# Patient Record
Sex: MALE | Race: WHITE | Employment: OTHER | URBAN - METROPOLITAN AREA
[De-identification: names, ages, dates, MRNs, and addresses within clinical notes are randomized per-mention and may not be internally consistent; named-entity substitution may affect disease eponyms.]

---

## 2020-03-03 ENCOUNTER — APPOINTMENT (OUTPATIENT)
Dept: CT IMAGING | Age: 70
DRG: 871 | End: 2020-03-03
Payer: MEDICARE

## 2020-03-03 ENCOUNTER — APPOINTMENT (OUTPATIENT)
Dept: GENERAL RADIOLOGY | Age: 70
DRG: 871 | End: 2020-03-03
Payer: MEDICARE

## 2020-03-03 ENCOUNTER — HOSPITAL ENCOUNTER (INPATIENT)
Age: 70
LOS: 4 days | Discharge: HOME | DRG: 871 | End: 2020-03-07
Attending: EMERGENCY MEDICINE | Admitting: INTERNAL MEDICINE
Payer: MEDICARE

## 2020-03-03 PROBLEM — J18.9 PNEUMONIA: Status: ACTIVE | Noted: 2020-03-03

## 2020-03-03 LAB
ABSOLUTE EOS #: 0 K/UL (ref 0–0.4)
ABSOLUTE IMMATURE GRANULOCYTE: 0 K/UL (ref 0–0.3)
ABSOLUTE LYMPH #: 4.04 K/UL (ref 1–4.8)
ABSOLUTE MONO #: 0.4 K/UL (ref 0.2–0.8)
ANION GAP SERPL CALCULATED.3IONS-SCNC: 16 MMOL/L (ref 9–17)
ATYPICAL LYMPHOCYTE ABSOLUTE COUNT: 1.11 K/UL
ATYPICAL LYMPHOCYTES: 11 %
BASOPHILS # BLD: 0 %
BASOPHILS ABSOLUTE: 0 K/UL (ref 0–0.2)
BNP INTERPRETATION: ABNORMAL
BUN BLDV-MCNC: 19 MG/DL (ref 8–23)
BUN/CREAT BLD: 17 (ref 9–20)
CALCIUM SERPL-MCNC: 8.7 MG/DL (ref 8.6–10.4)
CHLORIDE BLD-SCNC: 95 MMOL/L (ref 98–107)
CO2: 22 MMOL/L (ref 20–31)
CREAT SERPL-MCNC: 1.09 MG/DL (ref 0.7–1.2)
DIFFERENTIAL TYPE: ABNORMAL
DIRECT EXAM: NORMAL
EOSINOPHILS RELATIVE PERCENT: 0 % (ref 1–4)
FIO2: 50
GFR AFRICAN AMERICAN: >60 ML/MIN
GFR NON-AFRICAN AMERICAN: >60 ML/MIN
GFR SERPL CREATININE-BSD FRML MDRD: ABNORMAL ML/MIN/{1.73_M2}
GFR SERPL CREATININE-BSD FRML MDRD: ABNORMAL ML/MIN/{1.73_M2}
GLUCOSE BLD-MCNC: 114 MG/DL (ref 70–99)
HCT VFR BLD CALC: 42.4 % (ref 40.7–50.3)
HEMOGLOBIN: 13.9 G/DL (ref 13–17)
IMMATURE GRANULOCYTES: 0 %
LACTIC ACID, SEPSIS WHOLE BLOOD: NORMAL MMOL/L (ref 0.5–1.9)
LACTIC ACID, SEPSIS WHOLE BLOOD: NORMAL MMOL/L (ref 0.5–1.9)
LACTIC ACID, SEPSIS: 0.9 MMOL/L (ref 0.5–1.9)
LACTIC ACID, SEPSIS: 1.5 MMOL/L (ref 0.5–1.9)
LYMPHOCYTES # BLD: 40 % (ref 24–44)
Lab: NORMAL
MCH RBC QN AUTO: 31.1 PG (ref 25.2–33.5)
MCHC RBC AUTO-ENTMCNC: 32.8 G/DL (ref 28.4–34.8)
MCV RBC AUTO: 94.9 FL (ref 82.6–102.9)
MONOCYTES # BLD: 4 % (ref 1–7)
NEGATIVE BASE EXCESS, ART: 4 (ref 0–2)
NRBC AUTOMATED: 0 PER 100 WBC
O2 DEVICE/FLOW/%: ABNORMAL
PATIENT TEMP: 103
PDW BLD-RTO: 12.9 % (ref 11.8–14.4)
PLATELET # BLD: 104 K/UL (ref 138–453)
PLATELET ESTIMATE: ABNORMAL
PMV BLD AUTO: 10.4 FL (ref 8.1–13.5)
POC HCO3: 20.3 MMOL/L (ref 22–27)
POC O2 SATURATION: 89 %
POC PCO2 TEMP: 34 MM HG
POC PCO2: 31 MM HG (ref 32–45)
POC PH TEMP: 7.39
POC PH: 7.43 (ref 7.35–7.45)
POC PO2 TEMP: 65 MM HG
POC PO2: 55 MM HG (ref 75–95)
POSITIVE BASE EXCESS, ART: ABNORMAL (ref 0–2)
POTASSIUM SERPL-SCNC: 4 MMOL/L (ref 3.7–5.3)
PRO-BNP: 710 PG/ML
RBC # BLD: 4.47 M/UL (ref 4.21–5.77)
RBC # BLD: ABNORMAL 10*6/UL
SEG NEUTROPHILS: 45 % (ref 36–66)
SEGMENTED NEUTROPHILS ABSOLUTE COUNT: 4.55 K/UL (ref 1.8–7.7)
SODIUM BLD-SCNC: 133 MMOL/L (ref 135–144)
SPECIMEN DESCRIPTION: NORMAL
TCO2 (CALC), ART: 21 MMOL/L (ref 23–28)
TROPONIN INTERP: NORMAL
TROPONIN T: NORMAL NG/ML
TROPONIN, HIGH SENSITIVITY: 22 NG/L (ref 0–22)
WBC # BLD: 10.1 K/UL (ref 3.5–11.3)
WBC # BLD: ABNORMAL 10*3/UL

## 2020-03-03 PROCEDURE — 87086 URINE CULTURE/COLONY COUNT: CPT

## 2020-03-03 PROCEDURE — 93005 ELECTROCARDIOGRAM TRACING: CPT | Performed by: EMERGENCY MEDICINE

## 2020-03-03 PROCEDURE — 96374 THER/PROPH/DIAG INJ IV PUSH: CPT

## 2020-03-03 PROCEDURE — 83880 ASSAY OF NATRIURETIC PEPTIDE: CPT

## 2020-03-03 PROCEDURE — 71045 X-RAY EXAM CHEST 1 VIEW: CPT

## 2020-03-03 PROCEDURE — 2580000003 HC RX 258: Performed by: EMERGENCY MEDICINE

## 2020-03-03 PROCEDURE — 81001 URINALYSIS AUTO W/SCOPE: CPT

## 2020-03-03 PROCEDURE — 6360000004 HC RX CONTRAST MEDICATION: Performed by: EMERGENCY MEDICINE

## 2020-03-03 PROCEDURE — 80048 BASIC METABOLIC PNL TOTAL CA: CPT

## 2020-03-03 PROCEDURE — 94761 N-INVAS EAR/PLS OXIMETRY MLT: CPT

## 2020-03-03 PROCEDURE — 96375 TX/PRO/DX INJ NEW DRUG ADDON: CPT

## 2020-03-03 PROCEDURE — 71260 CT THORAX DX C+: CPT

## 2020-03-03 PROCEDURE — 82803 BLOOD GASES ANY COMBINATION: CPT

## 2020-03-03 PROCEDURE — 83605 ASSAY OF LACTIC ACID: CPT

## 2020-03-03 PROCEDURE — 6360000002 HC RX W HCPCS: Performed by: EMERGENCY MEDICINE

## 2020-03-03 PROCEDURE — 87040 BLOOD CULTURE FOR BACTERIA: CPT

## 2020-03-03 PROCEDURE — 94640 AIRWAY INHALATION TREATMENT: CPT

## 2020-03-03 PROCEDURE — 85025 COMPLETE CBC W/AUTO DIFF WBC: CPT

## 2020-03-03 PROCEDURE — 2000000000 HC ICU R&B

## 2020-03-03 PROCEDURE — 87804 INFLUENZA ASSAY W/OPTIC: CPT

## 2020-03-03 PROCEDURE — 36600 WITHDRAWAL OF ARTERIAL BLOOD: CPT

## 2020-03-03 PROCEDURE — 6370000000 HC RX 637 (ALT 250 FOR IP): Performed by: EMERGENCY MEDICINE

## 2020-03-03 PROCEDURE — 84484 ASSAY OF TROPONIN QUANT: CPT

## 2020-03-03 PROCEDURE — 2700000000 HC OXYGEN THERAPY PER DAY

## 2020-03-03 PROCEDURE — 99285 EMERGENCY DEPT VISIT HI MDM: CPT

## 2020-03-03 PROCEDURE — 36415 COLL VENOUS BLD VENIPUNCTURE: CPT

## 2020-03-03 RX ORDER — METHYLPREDNISOLONE SODIUM SUCCINATE 125 MG/2ML
125 INJECTION, POWDER, LYOPHILIZED, FOR SOLUTION INTRAMUSCULAR; INTRAVENOUS ONCE
Status: COMPLETED | OUTPATIENT
Start: 2020-03-03 | End: 2020-03-03

## 2020-03-03 RX ORDER — SODIUM CHLORIDE 9 MG/ML
INJECTION, SOLUTION INTRAVENOUS CONTINUOUS
Status: DISCONTINUED | OUTPATIENT
Start: 2020-03-04 | End: 2020-03-06

## 2020-03-03 RX ORDER — SODIUM CHLORIDE 0.9 % (FLUSH) 0.9 %
10 SYRINGE (ML) INJECTION EVERY 12 HOURS SCHEDULED
Status: DISCONTINUED | OUTPATIENT
Start: 2020-03-04 | End: 2020-03-07 | Stop reason: HOSPADM

## 2020-03-03 RX ORDER — ATORVASTATIN CALCIUM 40 MG/1
40 TABLET, FILM COATED ORAL DAILY
Status: DISCONTINUED | OUTPATIENT
Start: 2020-03-04 | End: 2020-03-07 | Stop reason: HOSPADM

## 2020-03-03 RX ORDER — LISINOPRIL 5 MG/1
5 TABLET ORAL DAILY
Status: DISCONTINUED | OUTPATIENT
Start: 2020-03-04 | End: 2020-03-07 | Stop reason: HOSPADM

## 2020-03-03 RX ORDER — 0.9 % SODIUM CHLORIDE 0.9 %
80 INTRAVENOUS SOLUTION INTRAVENOUS ONCE
Status: COMPLETED | OUTPATIENT
Start: 2020-03-03 | End: 2020-03-03

## 2020-03-03 RX ORDER — ACETAMINOPHEN 325 MG/1
650 TABLET ORAL EVERY 6 HOURS PRN
Status: DISCONTINUED | OUTPATIENT
Start: 2020-03-03 | End: 2020-03-07 | Stop reason: HOSPADM

## 2020-03-03 RX ORDER — SODIUM CHLORIDE 0.9 % (FLUSH) 0.9 %
10 SYRINGE (ML) INJECTION PRN
Status: DISCONTINUED | OUTPATIENT
Start: 2020-03-03 | End: 2020-03-03 | Stop reason: SDUPTHER

## 2020-03-03 RX ORDER — ACETAMINOPHEN 650 MG/1
650 SUPPOSITORY RECTAL EVERY 6 HOURS PRN
Status: DISCONTINUED | OUTPATIENT
Start: 2020-03-03 | End: 2020-03-07 | Stop reason: HOSPADM

## 2020-03-03 RX ORDER — ALBUTEROL SULFATE 2.5 MG/3ML
15 SOLUTION RESPIRATORY (INHALATION)
Status: DISCONTINUED | OUTPATIENT
Start: 2020-03-03 | End: 2020-03-03

## 2020-03-03 RX ORDER — ALBUTEROL SULFATE 2.5 MG/3ML
2.5 SOLUTION RESPIRATORY (INHALATION)
Status: DISCONTINUED | OUTPATIENT
Start: 2020-03-03 | End: 2020-03-03

## 2020-03-03 RX ORDER — PANTOPRAZOLE SODIUM 40 MG/1
40 TABLET, DELAYED RELEASE ORAL
Status: DISCONTINUED | OUTPATIENT
Start: 2020-03-04 | End: 2020-03-07 | Stop reason: HOSPADM

## 2020-03-03 RX ORDER — SODIUM CHLORIDE 0.9 % (FLUSH) 0.9 %
10 SYRINGE (ML) INJECTION ONCE
Status: COMPLETED | OUTPATIENT
Start: 2020-03-03 | End: 2020-03-03

## 2020-03-03 RX ORDER — 0.9 % SODIUM CHLORIDE 0.9 %
30 INTRAVENOUS SOLUTION INTRAVENOUS ONCE
Status: COMPLETED | OUTPATIENT
Start: 2020-03-03 | End: 2020-03-03

## 2020-03-03 RX ORDER — SODIUM CHLORIDE 0.9 % (FLUSH) 0.9 %
10 SYRINGE (ML) INJECTION EVERY 12 HOURS SCHEDULED
Status: DISCONTINUED | OUTPATIENT
Start: 2020-03-03 | End: 2020-03-03 | Stop reason: SDUPTHER

## 2020-03-03 RX ORDER — ACETAMINOPHEN 500 MG
1000 TABLET ORAL ONCE
Status: COMPLETED | OUTPATIENT
Start: 2020-03-03 | End: 2020-03-03

## 2020-03-03 RX ORDER — KETOROLAC TROMETHAMINE 30 MG/ML
30 INJECTION, SOLUTION INTRAMUSCULAR; INTRAVENOUS ONCE
Status: COMPLETED | OUTPATIENT
Start: 2020-03-03 | End: 2020-03-03

## 2020-03-03 RX ORDER — SODIUM CHLORIDE 0.9 % (FLUSH) 0.9 %
10 SYRINGE (ML) INJECTION PRN
Status: DISCONTINUED | OUTPATIENT
Start: 2020-03-03 | End: 2020-03-07 | Stop reason: HOSPADM

## 2020-03-03 RX ORDER — METOPROLOL SUCCINATE 25 MG/1
25 TABLET, EXTENDED RELEASE ORAL DAILY
Status: DISCONTINUED | OUTPATIENT
Start: 2020-03-04 | End: 2020-03-07 | Stop reason: HOSPADM

## 2020-03-03 RX ADMIN — SODIUM CHLORIDE 80 ML: 9 INJECTION, SOLUTION INTRAVENOUS at 19:14

## 2020-03-03 RX ADMIN — CEFTRIAXONE SODIUM 1 G: 1 INJECTION, POWDER, FOR SOLUTION INTRAMUSCULAR; INTRAVENOUS at 19:56

## 2020-03-03 RX ADMIN — Medication 10 ML: at 19:14

## 2020-03-03 RX ADMIN — METHYLPREDNISOLONE SODIUM SUCCINATE 125 MG: 125 INJECTION, POWDER, FOR SOLUTION INTRAMUSCULAR; INTRAVENOUS at 19:45

## 2020-03-03 RX ADMIN — ALBUTEROL SULFATE 2.5 MG: 2.5 SOLUTION RESPIRATORY (INHALATION) at 18:44

## 2020-03-03 RX ADMIN — ALBUTEROL SULFATE 2.5 MG: 2.5 SOLUTION RESPIRATORY (INHALATION) at 18:40

## 2020-03-03 RX ADMIN — ACETAMINOPHEN 1000 MG: 500 TABLET ORAL at 19:06

## 2020-03-03 RX ADMIN — IOPAMIDOL 75 ML: 755 INJECTION, SOLUTION INTRAVENOUS at 19:14

## 2020-03-03 RX ADMIN — AZITHROMYCIN MONOHYDRATE 500 MG: 500 INJECTION, POWDER, LYOPHILIZED, FOR SOLUTION INTRAVENOUS at 20:33

## 2020-03-03 RX ADMIN — SODIUM CHLORIDE 2178 ML: 9 INJECTION, SOLUTION INTRAVENOUS at 19:29

## 2020-03-03 RX ADMIN — KETOROLAC TROMETHAMINE 30 MG: 30 INJECTION, SOLUTION INTRAMUSCULAR at 19:45

## 2020-03-03 ASSESSMENT — ENCOUNTER SYMPTOMS
ABDOMINAL PAIN: 0
CHEST TIGHTNESS: 0
COUGH: 1
SHORTNESS OF BREATH: 1
FACIAL SWELLING: 0
WHEEZING: 1
BACK PAIN: 0
EYE DISCHARGE: 0
ABDOMINAL DISTENTION: 0
EYE PAIN: 0

## 2020-03-03 ASSESSMENT — PAIN DESCRIPTION - PAIN TYPE: TYPE: ACUTE PAIN

## 2020-03-03 ASSESSMENT — PAIN SCALES - GENERAL
PAINLEVEL_OUTOF10: 8
PAINLEVEL_OUTOF10: 9
PAINLEVEL_OUTOF10: 10
PAINLEVEL_OUTOF10: 0

## 2020-03-03 ASSESSMENT — PAIN DESCRIPTION - ONSET: ONSET: ON-GOING

## 2020-03-03 ASSESSMENT — PAIN DESCRIPTION - DESCRIPTORS: DESCRIPTORS: PRESSURE

## 2020-03-03 ASSESSMENT — PAIN DESCRIPTION - FREQUENCY: FREQUENCY: CONTINUOUS

## 2020-03-03 ASSESSMENT — PAIN DESCRIPTION - PROGRESSION: CLINICAL_PROGRESSION: NOT CHANGED

## 2020-03-03 ASSESSMENT — PAIN DESCRIPTION - LOCATION: LOCATION: CHEST

## 2020-03-03 NOTE — ED PROVIDER NOTES
Physical Exam  Vitals signs and nursing note reviewed. Constitutional:       General: He is not in acute distress. Appearance: He is well-developed. He is not diaphoretic. HENT:      Head: Normocephalic and atraumatic. Eyes:      Pupils: Pupils are equal, round, and reactive to light. Neck:      Musculoskeletal: Normal range of motion and neck supple. Cardiovascular:      Rate and Rhythm: Normal rate and regular rhythm. Pulmonary:      Effort: Respiratory distress present. Breath sounds: Wheezing present. Comments: Patient in obvious respiratory distress tachypnea talking in 1 and 2 word sentences with accessory use and retractions. Abdominal:      General: Bowel sounds are normal.      Palpations: Abdomen is soft. Musculoskeletal: Normal range of motion. Skin:     General: Skin is warm. Capillary Refill: Capillary refill takes less than 2 seconds. Neurological:      Mental Status: He is alert and oriented to person, place, and time. MEDICAL DECISION MAKING:   Patient was seen and examined. Patient 71-year-old male who presented to the emergency department secondary to shortness of breath wheezing. On arrival in the emergency department patient 84% on room air was transitioned to nasal cannula with only improvement to 85% and subsequently to a nonrebreather by myself. Respiratory protocol initiated. Patient wheezing on exam received a DuoNeb breathing treatment Solu-Medrol. Patient was febrile rapid influenza obtained chest x-ray UA to rule out an infectious source. Given patient's hypoxia and shortness of breath, he underwent a CT to rule out PE. Patient will likely require admission and will be reevaluated. Patient reevaluated continued to have hypoxia was transferred to a Ventimask. Given elevated temperature and tachypnea patient  Meets SIRS criteria. Rapid influenza negative.  Chest x-ray concerning for pneumonia, CT negative for PE but also concerning for the visit, no guarantees can be provided that every mistake has been identified and corrected by editing.                     Samantha Patten MD  10/99/41 2045

## 2020-03-04 PROBLEM — J96.01 ACUTE HYPOXEMIC RESPIRATORY FAILURE (HCC): Status: ACTIVE | Noted: 2020-03-04

## 2020-03-04 PROBLEM — D64.9 NORMOCYTIC ANEMIA: Status: ACTIVE | Noted: 2020-03-04

## 2020-03-04 PROBLEM — D69.6 THROMBOCYTOPENIA (HCC): Status: ACTIVE | Noted: 2020-03-04

## 2020-03-04 PROBLEM — R73.9 HYPERGLYCEMIA: Status: ACTIVE | Noted: 2020-03-04

## 2020-03-04 PROBLEM — R59.0 MEDIASTINAL LYMPHADENOPATHY: Status: ACTIVE | Noted: 2020-03-04

## 2020-03-04 LAB
ADENOVIRUS PCR: NOT DETECTED
ALBUMIN SERPL-MCNC: 3 G/DL (ref 3.5–5.2)
ALBUMIN/GLOBULIN RATIO: ABNORMAL (ref 1–2.5)
ALP BLD-CCNC: 41 U/L (ref 40–129)
ALT SERPL-CCNC: 17 U/L (ref 5–41)
ANION GAP SERPL CALCULATED.3IONS-SCNC: 12 MMOL/L (ref 9–17)
AST SERPL-CCNC: 24 U/L
BILIRUB SERPL-MCNC: 0.33 MG/DL (ref 0.3–1.2)
BORDETELLA PARAPERTUSSIS: NOT DETECTED
BORDETELLA PERTUSSIS PCR: NOT DETECTED
BUN BLDV-MCNC: 22 MG/DL (ref 8–23)
BUN/CREAT BLD: 21 (ref 9–20)
CALCIUM IONIZED: 1.09 MMOL/L (ref 1.13–1.33)
CALCIUM SERPL-MCNC: 7.6 MG/DL (ref 8.6–10.4)
CHLAMYDIA PNEUMONIAE BY PCR: NOT DETECTED
CHLORIDE BLD-SCNC: 102 MMOL/L (ref 98–107)
CO2: 21 MMOL/L (ref 20–31)
CORONAVIRUS 229E PCR: NOT DETECTED
CORONAVIRUS HKU1 PCR: NOT DETECTED
CORONAVIRUS NL63 PCR: NOT DETECTED
CORONAVIRUS OC43 PCR: NOT DETECTED
CREAT SERPL-MCNC: 1.04 MG/DL (ref 0.7–1.2)
GFR AFRICAN AMERICAN: >60 ML/MIN
GFR NON-AFRICAN AMERICAN: >60 ML/MIN
GFR SERPL CREATININE-BSD FRML MDRD: ABNORMAL ML/MIN/{1.73_M2}
GFR SERPL CREATININE-BSD FRML MDRD: ABNORMAL ML/MIN/{1.73_M2}
GLUCOSE BLD-MCNC: 149 MG/DL (ref 75–110)
GLUCOSE BLD-MCNC: 189 MG/DL (ref 70–99)
GLUCOSE BLD-MCNC: 228 MG/DL (ref 75–110)
HCT VFR BLD CALC: 35.6 % (ref 40.7–50.3)
HEMOGLOBIN: 11.4 G/DL (ref 13–17)
HUMAN METAPNEUMOVIRUS PCR: DETECTED
INFLUENZA A BY PCR: NOT DETECTED
INFLUENZA A H1 (2009) PCR: ABNORMAL
INFLUENZA A H1 PCR: ABNORMAL
INFLUENZA A H3 PCR: ABNORMAL
INFLUENZA B BY PCR: NOT DETECTED
INR BLD: 1
LACTIC ACID, SEPSIS WHOLE BLOOD: NORMAL MMOL/L (ref 0.5–1.9)
LACTIC ACID, SEPSIS: 1 MMOL/L (ref 0.5–1.9)
MAGNESIUM: 2.1 MG/DL (ref 1.6–2.6)
MCH RBC QN AUTO: 31.1 PG (ref 25.2–33.5)
MCHC RBC AUTO-ENTMCNC: 32 G/DL (ref 28.4–34.8)
MCV RBC AUTO: 97 FL (ref 82.6–102.9)
MYCOPLASMA PNEUMONIAE PCR: NOT DETECTED
NRBC AUTOMATED: 0 PER 100 WBC
PARAINFLUENZA 1 PCR: NOT DETECTED
PARAINFLUENZA 2 PCR: NOT DETECTED
PARAINFLUENZA 3 PCR: NOT DETECTED
PARAINFLUENZA 4 PCR: NOT DETECTED
PDW BLD-RTO: 13 % (ref 11.8–14.4)
PHOSPHORUS: 4.3 MG/DL (ref 2.5–4.5)
PLATELET # BLD: 84 K/UL (ref 138–453)
PMV BLD AUTO: 10.6 FL (ref 8.1–13.5)
POTASSIUM SERPL-SCNC: 4.2 MMOL/L (ref 3.7–5.3)
PROCALCITONIN: 0.27 NG/ML
PROTHROMBIN TIME: 10.1 SEC (ref 9.7–11.6)
RBC # BLD: 3.67 M/UL (ref 4.21–5.77)
RESP SYNCYTIAL VIRUS PCR: NOT DETECTED
RHINO/ENTEROVIRUS PCR: NOT DETECTED
SODIUM BLD-SCNC: 135 MMOL/L (ref 135–144)
SPECIMEN DESCRIPTION: ABNORMAL
TOTAL PROTEIN: 5.7 G/DL (ref 6.4–8.3)
WBC # BLD: 9.2 K/UL (ref 3.5–11.3)

## 2020-03-04 PROCEDURE — 97530 THERAPEUTIC ACTIVITIES: CPT

## 2020-03-04 PROCEDURE — 80053 COMPREHEN METABOLIC PANEL: CPT

## 2020-03-04 PROCEDURE — 97163 PT EVAL HIGH COMPLEX 45 MIN: CPT

## 2020-03-04 PROCEDURE — 82947 ASSAY GLUCOSE BLOOD QUANT: CPT

## 2020-03-04 PROCEDURE — 99223 1ST HOSP IP/OBS HIGH 75: CPT | Performed by: INTERNAL MEDICINE

## 2020-03-04 PROCEDURE — 2700000000 HC OXYGEN THERAPY PER DAY

## 2020-03-04 PROCEDURE — 36415 COLL VENOUS BLD VENIPUNCTURE: CPT

## 2020-03-04 PROCEDURE — 6370000000 HC RX 637 (ALT 250 FOR IP): Performed by: NURSE PRACTITIONER

## 2020-03-04 PROCEDURE — 83036 HEMOGLOBIN GLYCOSYLATED A1C: CPT

## 2020-03-04 PROCEDURE — 94761 N-INVAS EAR/PLS OXIMETRY MLT: CPT

## 2020-03-04 PROCEDURE — 6360000002 HC RX W HCPCS: Performed by: NURSE PRACTITIONER

## 2020-03-04 PROCEDURE — 6370000000 HC RX 637 (ALT 250 FOR IP): Performed by: INTERNAL MEDICINE

## 2020-03-04 PROCEDURE — 2580000003 HC RX 258: Performed by: NURSE PRACTITIONER

## 2020-03-04 PROCEDURE — 85610 PROTHROMBIN TIME: CPT

## 2020-03-04 PROCEDURE — 97166 OT EVAL MOD COMPLEX 45 MIN: CPT

## 2020-03-04 PROCEDURE — 2000000000 HC ICU R&B

## 2020-03-04 PROCEDURE — 97535 SELF CARE MNGMENT TRAINING: CPT

## 2020-03-04 PROCEDURE — 84100 ASSAY OF PHOSPHORUS: CPT

## 2020-03-04 PROCEDURE — 85027 COMPLETE CBC AUTOMATED: CPT

## 2020-03-04 PROCEDURE — 0100U HC RESPIRPTHGN MULT REV TRANS & AMP PRB TECH 21 TRGT: CPT

## 2020-03-04 PROCEDURE — 84145 PROCALCITONIN (PCT): CPT

## 2020-03-04 PROCEDURE — 6360000002 HC RX W HCPCS: Performed by: INTERNAL MEDICINE

## 2020-03-04 PROCEDURE — 83735 ASSAY OF MAGNESIUM: CPT

## 2020-03-04 PROCEDURE — 82330 ASSAY OF CALCIUM: CPT

## 2020-03-04 RX ORDER — NICOTINE POLACRILEX 4 MG
15 LOZENGE BUCCAL PRN
Status: DISCONTINUED | OUTPATIENT
Start: 2020-03-04 | End: 2020-03-07 | Stop reason: HOSPADM

## 2020-03-04 RX ORDER — DEXTROSE MONOHYDRATE 25 G/50ML
12.5 INJECTION, SOLUTION INTRAVENOUS PRN
Status: DISCONTINUED | OUTPATIENT
Start: 2020-03-04 | End: 2020-03-07 | Stop reason: HOSPADM

## 2020-03-04 RX ORDER — METHYLPREDNISOLONE SODIUM SUCCINATE 40 MG/ML
40 INJECTION, POWDER, LYOPHILIZED, FOR SOLUTION INTRAMUSCULAR; INTRAVENOUS EVERY 8 HOURS
Status: DISCONTINUED | OUTPATIENT
Start: 2020-03-04 | End: 2020-03-07

## 2020-03-04 RX ORDER — DEXTROSE MONOHYDRATE 50 MG/ML
100 INJECTION, SOLUTION INTRAVENOUS PRN
Status: DISCONTINUED | OUTPATIENT
Start: 2020-03-04 | End: 2020-03-07 | Stop reason: HOSPADM

## 2020-03-04 RX ADMIN — SODIUM CHLORIDE: 9 INJECTION, SOLUTION INTRAVENOUS at 09:14

## 2020-03-04 RX ADMIN — AZITHROMYCIN MONOHYDRATE 500 MG: 500 INJECTION, POWDER, LYOPHILIZED, FOR SOLUTION INTRAVENOUS at 22:10

## 2020-03-04 RX ADMIN — CEFTRIAXONE SODIUM 1 G: 1 INJECTION, POWDER, FOR SOLUTION INTRAMUSCULAR; INTRAVENOUS at 22:10

## 2020-03-04 RX ADMIN — ASPIRIN 325 MG: 325 TABLET, DELAYED RELEASE ORAL at 09:18

## 2020-03-04 RX ADMIN — METOPROLOL SUCCINATE 25 MG: 25 TABLET, FILM COATED, EXTENDED RELEASE ORAL at 09:18

## 2020-03-04 RX ADMIN — METHYLPREDNISOLONE SODIUM SUCCINATE 40 MG: 40 INJECTION, POWDER, FOR SOLUTION INTRAMUSCULAR; INTRAVENOUS at 17:19

## 2020-03-04 RX ADMIN — PANTOPRAZOLE SODIUM 40 MG: 40 TABLET, DELAYED RELEASE ORAL at 07:53

## 2020-03-04 RX ADMIN — SODIUM CHLORIDE: 9 INJECTION, SOLUTION INTRAVENOUS at 00:02

## 2020-03-04 RX ADMIN — INSULIN LISPRO 1 UNITS: 100 INJECTION, SOLUTION INTRAVENOUS; SUBCUTANEOUS at 22:09

## 2020-03-04 RX ADMIN — METHYLPREDNISOLONE SODIUM SUCCINATE 40 MG: 40 INJECTION, POWDER, FOR SOLUTION INTRAMUSCULAR; INTRAVENOUS at 22:11

## 2020-03-04 RX ADMIN — LISINOPRIL 5 MG: 5 TABLET ORAL at 09:18

## 2020-03-04 RX ADMIN — ATORVASTATIN CALCIUM 40 MG: 40 TABLET, FILM COATED ORAL at 09:18

## 2020-03-04 ASSESSMENT — PAIN SCALES - GENERAL
PAINLEVEL_OUTOF10: 0

## 2020-03-04 NOTE — PROGRESS NOTES
Physical Therapy    Facility/Department: [facility] ICU  Initial Assessment    NAME: Harika Carvalho  : 1950  MRN: [id number]    Date of Service: 3/4/2020  Pt states he came to [facility] due to persistent flu like symptoms  Discharge Recommendations:  Home with assist PRN(Pulmonary Rehab)   PT Equipment Recommendations  Equipment Needed: No    Assessment   Body structures, Functions, Activity limitations: Decreased endurance  Decision Making: High Complexity  PT Education: General Safety;Transfer Training  Patient Education: Ankle pumps handout  REQUIRES PT FOLLOW UP: Yes  Activity Tolerance  Activity Tolerance: Patient limited by endurance       Patient Diagnosis(es): The primary encounter diagnosis was Pneumonia due to organism. Diagnoses of Acute respiratory failure with hypoxia (HCC), Cough, Mild intermittent reactive airway disease with acute exacerbation, Chest wall pain, and Septicemia ([facility] [address] were also pertinent to this visit. has a past medical history of CAD (coronary artery disease), History of heart artery stent, Hyperlipidemia, and Lymphoma ([facility] [address] has a past surgical history that includes Lumbar disc surgery; Hydrocele surgery (Left); arthrotomy; lymph node dissection; Finger amputation (Left); Rectal surgery; and Carotid endarterectomy (Left, 2014).     Restrictions  Restrictions/Precautions  Restrictions/Precautions: General Precautions, Fall Risk, Up as Tolerated  Required Braces or Orthoses?: No  Position Activity Restriction  Other position/activity restrictions: high flow O2, RUE IV   Vision/Hearing        Subjective  General  Patient assessed for rehabilitation services?: Yes  Response To Previous Treatment: Not applicable  Family / Caregiver Present: No  Follows Commands: Within Functional Limits  General Comment  Comments: OK for PT per Frances Bolaños RN  Pain Screening  Patient Currently in Pain: Denies  Vital Signs  Patient Currently in Pain: Denies       Orientation  Orientation  Overall Orientation Status: Within Normal Limits  Social/Functional History  Social/Functional History  Lives With: Alone  Type of Home: House  Home Layout: Able to Live on Main level with bedroom/bathroom(1.5 SH )  Home Access: Stairs to enter with rails(back door )  Entrance Stairs - Number of Steps: 2  Entrance Stairs - Rails: Both  Bathroom Shower/Tub: Tub/Shower unit  Bathroom Toilet: Standard  Bathroom Equipment: (no DME )  Home Equipment: Crutches, Rolling walker, [address] Help From: Family(pt states his dtr is supportive however works alot )  ADL Assistance: Independent  Homemaking Assistance: Independent  Homemaking Responsibilities: Yes  Ambulation Assistance: Independent  Transfer Assistance: Independent  Active : Yes  Occupation: Retired  Type of occupation:    Leisure & Hobbies: watch TV and fix cars   Additional Comments: Pt denies recent falls.     Cognition        Objective     Observation/Palpation  Posture: Good    AROM RLE (degrees)  RLE AROM: WNL  AROM LLE (degrees)  LLE AROM : WNL  AROM RUE (degrees)  RUE General AROM: See OT eval for B UE ROM  Strength RLE  Strength RLE: WFL  Comment: 5/5 B LE's  Strength LLE  Strength LLE: WFL  Strength RUE  Comment: See OT eval for B UE MMT  Tone RLE  RLE Tone: Normotonic  Tone LLE  LLE Tone: Normotonic  Motor Control  Gross Motor?: WNL  Sensation  Overall Sensation Status: WNL  Bed mobility  Rolling to Left: Modified independent  Supine to Sit: Modified independent  Scooting: Modified independent  Transfers  Sit to Stand: Contact guard assistance  Stand to sit: Contact guard assistance  Stand Pivot Transfers: Contact guard assistance  Ambulation  Ambulation?: Yes  Ambulation 1  Surface: level tile  Device: No Device  Other Apparatus: O2  Assistance: Contact guard assistance  Distance: 2 steps to chair,limited by lines  Stairs/Curb  Stairs?: No     Balance  Posture: Good  Sitting - Static: Good  Sitting - Dynamic: Good  Standing - Static:

## 2020-03-04 NOTE — H&P
wheezing and rhonchi, normal effort  Cardiovascular: normal rate, regular rhythm, no murmur, gallop, rub.   Abdomen: Soft, nontender, nondistended, normal bowel sounds, no hepatomegaly or splenomegaly  Neurologic: There are no new focal motor or sensory deficits, normal muscle tone and bulk, no abnormal sensation, normal speech, cranial nerves II through XII grossly intact  Skin: No gross lesions, rashes, bruising or bleeding on exposed skin area  Extremities:  peripheral pulses palpable, no pedal edema or calf pain with palpation  Psych: normal affect     Investigations:      Laboratory Testing:  Recent Results (from the past 24 hour(s))   EKG 12 Lead    Collection Time: 03/03/20  6:12 PM   Result Value Ref Range    Ventricular Rate 84 BPM    Atrial Rate 84 BPM    P-R Interval 140 ms    QRS Duration 88 ms    Q-T Interval 362 ms    QTc Calculation (Bazett) 427 ms    P Axis 74 degrees    R Axis 84 degrees    T Axis -56 degrees   CBC Auto Differential    Collection Time: 03/03/20  6:35 PM   Result Value Ref Range    WBC 10.1 3.5 - 11.3 k/uL    RBC 4.47 4.21 - 5.77 m/uL    Hemoglobin 13.9 13.0 - 17.0 g/dL    Hematocrit 42.4 40.7 - 50.3 %    MCV 94.9 82.6 - 102.9 fL    MCH 31.1 25.2 - 33.5 pg    MCHC 32.8 28.4 - 34.8 g/dL    RDW 12.9 11.8 - 14.4 %    Platelets 523 (L) 112 - 453 k/uL    MPV 10.4 8.1 - 13.5 fL    NRBC Automated 0.0 0.0 per 100 WBC    Differential Type NOT REPORTED     WBC Morphology NOT REPORTED     RBC Morphology NOT REPORTED     Platelet Estimate NOT REPORTED     Seg Neutrophils 45 36 - 66 %    Lymphocytes 40 24 - 44 %    Atypical Lymphocytes 11 %    Monocytes 4 1 - 7 %    Eosinophils % 0 (L) 1 - 4 %    Basophils 0 %    Immature Granulocytes 0 0 %    Segs Absolute 4.55 1.8 - 7.7 k/uL    Absolute Lymph # 4.04 1.0 - 4.8 k/uL    Atypical Lymphocytes Absolute 1.11 k/uL    Absolute Mono # 0.40 0.2 - 0.8 k/uL    Absolute Eos # 0.00 0.0 - 0.4 k/uL    Basophils Absolute 0.00 0.0 - 0.2 k/uL    Absolute Immature Granulocyte 0.00 0.00 - 0.30 k/uL   Basic Metabolic Panel w/ Reflex to MG    Collection Time: 03/03/20  6:35 PM   Result Value Ref Range    Glucose 114 (H) 70 - 99 mg/dL    BUN 19 8 - 23 mg/dL    CREATININE 1.09 0.70 - 1.20 mg/dL    Bun/Cre Ratio 17 9 - 20    Calcium 8.7 8.6 - 10.4 mg/dL    Sodium 133 (L) 135 - 144 mmol/L    Potassium 4.0 3.7 - 5.3 mmol/L    Chloride 95 (L) 98 - 107 mmol/L    CO2 22 20 - 31 mmol/L    Anion Gap 16 9 - 17 mmol/L    GFR Non-African American >60 >60 mL/min    GFR African American >60 >60 mL/min    GFR Comment          GFR Staging NOT REPORTED    Troponin    Collection Time: 03/03/20  6:35 PM   Result Value Ref Range    Troponin, High Sensitivity 22 0 - 22 ng/L    Troponin T NOT REPORTED <0.03 ng/mL    Troponin Interp NOT REPORTED    Brain Natriuretic Peptide    Collection Time: 03/03/20  6:35 PM   Result Value Ref Range    Pro- (H) <300 pg/mL    BNP Interpretation Pro-BNP Reference Range:    Lactate, Sepsis    Collection Time: 03/03/20  6:35 PM   Result Value Ref Range    Lactic Acid, Sepsis 1.5 0.5 - 1.9 mmol/L    Lactic Acid, Sepsis, Whole Blood NOT REPORTED 0.5 - 1.9 mmol/L   Culture, Blood 1    Collection Time: 03/03/20  6:35 PM   Result Value Ref Range    Specimen Description . BLOOD     Special Requests RT AC, 12 ML     Culture NO GROWTH 16 HOURS    Rapid influenza A/B antigens    Collection Time: 03/03/20  6:35 PM   Result Value Ref Range    Specimen Description . NASOPHARYNGEAL SWAB     Special Requests NOT REPORTED     Direct Exam       NEGATIVE for Influenza A + B antigens. PCR testing to confirm this result is available upon request.  Specimen will be saved in the laboratory for 7 days. Please call [phone] if PCR testing is indicated. Culture, Blood 2    Collection Time: 03/03/20  6:50 PM   Result Value Ref Range    Specimen Description . BLOOD     Special Requests LT AC, 8 ML     Culture NO GROWTH 16 HOURS    POC PANEL (G3)-ART 4.21 - 5.77 m/uL    Hemoglobin 11.4 (L) 13.0 - 17.0 g/dL    Hematocrit 35.6 (L) 40.7 - 50.3 %    MCV 97.0 82.6 - 102.9 fL    MCH 31.1 25.2 - 33.5 pg    MCHC 32.0 28.4 - 34.8 g/dL    RDW 13.0 11.8 - 14.4 %    Platelets 84 (L) 555 - 453 k/uL    MPV 10.6 8.1 - 13.5 fL    NRBC Automated 0.0 0.0 per 100 WBC   Protime-INR    Collection Time: 03/04/20  4:55 AM   Result Value Ref Range    Protime 10.1 9.7 - 11.6 sec    INR 1.0    Ionized Calcium    Collection Time: 03/04/20  4:55 AM   Result Value Ref Range    Calcium, Ion 1.09 (L) 1.13 - 1.33 mmol/L   Magnesium    Collection Time: 03/04/20  4:55 AM   Result Value Ref Range    Magnesium 2.1 1.6 - 2.6 mg/dL   Phosphorus    Collection Time: 03/04/20  4:55 AM   Result Value Ref Range    Phosphorus 4.3 2.5 - 4.5 mg/dL       Imaging/Diagnostics:  Xr Chest Portable    Result Date: 3/3/2020  Possible right upper lobe airspace opacity could represent pneumonia     Ct Chest Pulmonary Embolism W Contrast    Result Date: 3/3/2020  Airspace disease and mediastinal lymphadenopathy most likely infectious in etiology. Follow-up to resolution recommended. Clinical correlation required. Assessment :      Hospital Problems           Last Modified POA    * (Principal) Pneumonia of right upper lobe due to infectious organism ([address] 3/4/2020 Yes    Essential hypertension 3/4/2020 Yes    Coronary artery disease involving native coronary artery of native heart without angina pectoris 3/4/2020 Yes    Tobacco abuse (Chronic) 3/4/2020 Yes    Acute hypoxemic respiratory failure ([address] 3/4/2020 Yes    Normocytic anemia 3/4/2020 Yes    Mediastinal lymphadenopathy 3/4/2020 Yes    Hyperglycemia 3/4/2020 Yes    Thrombocytopenia ([address] 3/4/2020 Yes          Plan:     Patient status inpatient in the Medical ICU    1. Admit inpatient  2. BiPAP and high flow oxygen as needed to maintain oxygen saturations  3. Antibiotics as ordered  4. Insulin scale  5. Check hemoglobin A1c  6.  Monitor and control blood

## 2020-03-04 NOTE — PROGRESS NOTES
([facility] [address] Cough, Mild intermittent reactive airway disease with acute exacerbation, Chest wall pain, and Septicemia ([facility] [address] were also pertinent to this visit. has a past medical history of CAD (coronary artery disease), History of heart artery stent, Hyperlipidemia, and Lymphoma ([facility] [address] has a past surgical history that includes Lumbar disc surgery; Hydrocele surgery (Left); arthrotomy; lymph node dissection; Finger amputation (Left); Rectal surgery; and Carotid endarterectomy (Left, 4/16/2014). PER H&P: Patient is a 78-year-old male who presented to the emergency department secondary cough congestion wheezing as well as shortness of breath. Patient was seen by his primary care doctor today diagnosed with COPD given an inhaler and discharged home. Patient said since going home he has had increased shortness of breath subjective fevers and chills and overall not feeling well. No previous history of asthma or COPD not on home oxygen however patient arrived in the emergency department patient was hypoxic 85% on room air tachycardic as well as febrile. Patient did not get his influenza vaccination, he states he does not believe in them. Denies sick contacts or recent travel outside the country or contact with those of recently travel outside the country. Patient states he does feel short of breath cannot catch his breath. No previous history of PE no clotting disorder. History of high blood pressure as well as hyperlipidemia he takes lisinopril and cholesterol medication.       Restrictions  Restrictions/Precautions  Restrictions/Precautions: General Precautions, Fall Risk, Up as Tolerated  Required Braces or Orthoses?: No  Position Activity Restriction  Other position/activity restrictions: high flow O2, RUE IV     Subjective   General  Chart Reviewed: Yes  Patient assessed for rehabilitation services?: Yes  Family / Caregiver Present: No  Patient Currently in Pain: Denies    Social/Functional History  Social/Functional History  Lives With: Alone  Type of Home: House  Home Layout: Able to Live on Main level with bedroom/bathroom(1.5 SH )  Home Access: Stairs to enter with rails(back door )  Entrance Stairs - Number of Steps: 2  Entrance Stairs - Rails: Both  Bathroom Shower/Tub: Tub/Shower unit  Bathroom Toilet: Standard  Bathroom Equipment: (no DME )  Home Equipment: Crutches, Rolling walker, [employer] Help From: Family(pt states his dtr is supportive however works alot )  ADL Assistance: Independent  Homemaking Assistance: Independent  Homemaking Responsibilities: Yes  Ambulation Assistance: Independent  Transfer Assistance: Independent  Active : Yes  Occupation: Retired  Type of occupation:    Leisure & Hobbies: watch TV and fix cars   Additional Comments: Pt denies recent falls. Objective   Vision: Impaired(pt states his vision is improving but not the best )  Vision Exceptions: Wears glasses at all times  Hearing: Within functional limits    Orientation  Overall Orientation Status: Within Functional Limits  Observation/Palpation  Posture: Good  Observation: high flow O2. Edema: none   Balance  Sitting Balance: Stand by assistance  Standing Balance: Contact guard assistance(no AD )  Standing Balance  Time: stand vikram < 30 seconds with self care and functional tasks   Functional Mobility  Functional - Mobility Device: No device  Activity: (bed to bedside chair )  Assist Level: Contact guard assistance  Functional Mobility Comments: Min verbal instruction needed for pursed lip breathing, pacing self and awareness/assist with all lines to increase safety/reduce falls.     Toilet Transfers  Toilet Transfers Comments: N/T and pt declined/stated no needs during eval.    ADL  Feeding: Independent  Grooming: Setup(seated )  UE Bathing: Setup;Stand by assistance  LE Bathing: Setup;Contact guard assistance  UE Dressing: Setup;Minimal assistance(with hosp gown )  LE Dressing: Equipment Evaluation, Education, & procurement, Endurance Training, Neuromuscular Re-education, Patient/Caregiver Education & Training                                                  AM-PAC Score   19          Goals  Short term goals  Time Frame for Short term goals: by discharge, pt to demo   Short term goal 1: SUP-I with ADL transfers and functional mob. Short term goal 2: I with BUE HEP with hand outs as needed to maintain functional use/  Short term goal 3: toileting tasks with use of BSC/grab bar as needed to SUP-MOD I. Short term goal 4: UB ADL to set up and LB ADL to SUP-MOD I with use of AE as needed. Short term goal 5: standing to SUP with AD as needed vikram > 4 mins as able to reduce falls with self care. Long term goals  Long term goal 1: Pt to be I with pursed lip breathing, EC/WS and fall prevention tech as well as AE/DME recommendations with use of hand outs as needed. Patient Goals   Patient goals : get home!        Therapy Time   Individual Concurrent Group Co-treatment   Time In 0932(plus 10 min chart review/RN communication )         Time Out 1000         Minutes 28         Timed Code Treatment Minutes: [address]       David Linares, OT

## 2020-03-05 ENCOUNTER — APPOINTMENT (OUTPATIENT)
Dept: GENERAL RADIOLOGY | Age: 70
DRG: 871 | End: 2020-03-05
Payer: MEDICARE

## 2020-03-05 PROBLEM — J12.3 HUMAN METAPNEUMOVIRUS (HMPV) PNEUMONIA: Status: ACTIVE | Noted: 2020-03-05

## 2020-03-05 LAB
ABSOLUTE EOS #: 0 K/UL (ref 0–0.4)
ABSOLUTE IMMATURE GRANULOCYTE: 0.16 K/UL (ref 0–0.3)
ABSOLUTE LYMPH #: 8.68 K/UL (ref 1–4.8)
ABSOLUTE MONO #: 0.32 K/UL (ref 0.2–0.8)
ANION GAP SERPL CALCULATED.3IONS-SCNC: 8 MMOL/L (ref 9–17)
ATYPICAL LYMPHOCYTE ABSOLUTE COUNT: 1.58 K/UL
ATYPICAL LYMPHOCYTES: 10 %
BASOPHILS # BLD: 0 %
BASOPHILS ABSOLUTE: 0 K/UL (ref 0–0.2)
BUN BLDV-MCNC: 17 MG/DL (ref 8–23)
BUN/CREAT BLD: 21 (ref 9–20)
CALCIUM SERPL-MCNC: 8.1 MG/DL (ref 8.6–10.4)
CHLORIDE BLD-SCNC: 104 MMOL/L (ref 98–107)
CO2: 25 MMOL/L (ref 20–31)
CREAT SERPL-MCNC: 0.82 MG/DL (ref 0.7–1.2)
DIFFERENTIAL TYPE: ABNORMAL
EKG ATRIAL RATE: 84 BPM
EKG P AXIS: 74 DEGREES
EKG P-R INTERVAL: 140 MS
EKG Q-T INTERVAL: 362 MS
EKG QRS DURATION: 88 MS
EKG QTC CALCULATION (BAZETT): 427 MS
EKG R AXIS: 84 DEGREES
EKG T AXIS: -56 DEGREES
EKG VENTRICULAR RATE: 84 BPM
EOSINOPHILS RELATIVE PERCENT: 0 % (ref 1–4)
ESTIMATED AVERAGE GLUCOSE: 131 MG/DL
GFR AFRICAN AMERICAN: >60 ML/MIN
GFR NON-AFRICAN AMERICAN: >60 ML/MIN
GFR SERPL CREATININE-BSD FRML MDRD: ABNORMAL ML/MIN/{1.73_M2}
GFR SERPL CREATININE-BSD FRML MDRD: ABNORMAL ML/MIN/{1.73_M2}
GLUCOSE BLD-MCNC: 107 MG/DL (ref 75–110)
GLUCOSE BLD-MCNC: 132 MG/DL (ref 75–110)
GLUCOSE BLD-MCNC: 137 MG/DL (ref 75–110)
GLUCOSE BLD-MCNC: 204 MG/DL (ref 70–99)
HBA1C MFR BLD: 6.2 % (ref 4–6)
HCT VFR BLD CALC: 37 % (ref 40.7–50.3)
HEMOGLOBIN: 11.8 G/DL (ref 13–17)
IMMATURE GRANULOCYTES: 1 %
LYMPHOCYTES # BLD: 55 % (ref 24–44)
MCH RBC QN AUTO: 31 PG (ref 25.2–33.5)
MCHC RBC AUTO-ENTMCNC: 31.9 G/DL (ref 28.4–34.8)
MCV RBC AUTO: 97.1 FL (ref 82.6–102.9)
MONOCYTES # BLD: 2 % (ref 1–7)
NRBC AUTOMATED: 0 PER 100 WBC
PDW BLD-RTO: 13.1 % (ref 11.8–14.4)
PLATELET # BLD: 88 K/UL (ref 138–453)
PLATELET ESTIMATE: ABNORMAL
PMV BLD AUTO: 11.2 FL (ref 8.1–13.5)
POTASSIUM SERPL-SCNC: 4.8 MMOL/L (ref 3.7–5.3)
RBC # BLD: 3.81 M/UL (ref 4.21–5.77)
RBC # BLD: ABNORMAL 10*6/UL
SEG NEUTROPHILS: 32 % (ref 36–66)
SEGMENTED NEUTROPHILS ABSOLUTE COUNT: 5.06 K/UL (ref 1.8–7.7)
SODIUM BLD-SCNC: 137 MMOL/L (ref 135–144)
WBC # BLD: 15.8 K/UL (ref 3.5–11.3)
WBC # BLD: ABNORMAL 10*3/UL

## 2020-03-05 PROCEDURE — 6360000002 HC RX W HCPCS: Performed by: INTERNAL MEDICINE

## 2020-03-05 PROCEDURE — 6370000000 HC RX 637 (ALT 250 FOR IP): Performed by: NURSE PRACTITIONER

## 2020-03-05 PROCEDURE — 94761 N-INVAS EAR/PLS OXIMETRY MLT: CPT

## 2020-03-05 PROCEDURE — 80048 BASIC METABOLIC PNL TOTAL CA: CPT

## 2020-03-05 PROCEDURE — 71045 X-RAY EXAM CHEST 1 VIEW: CPT

## 2020-03-05 PROCEDURE — 2580000003 HC RX 258: Performed by: NURSE PRACTITIONER

## 2020-03-05 PROCEDURE — 94660 CPAP INITIATION&MGMT: CPT

## 2020-03-05 PROCEDURE — 36415 COLL VENOUS BLD VENIPUNCTURE: CPT

## 2020-03-05 PROCEDURE — 85025 COMPLETE CBC W/AUTO DIFF WBC: CPT

## 2020-03-05 PROCEDURE — 6370000000 HC RX 637 (ALT 250 FOR IP): Performed by: INTERNAL MEDICINE

## 2020-03-05 PROCEDURE — 93010 ELECTROCARDIOGRAM REPORT: CPT | Performed by: INTERNAL MEDICINE

## 2020-03-05 PROCEDURE — 82947 ASSAY GLUCOSE BLOOD QUANT: CPT

## 2020-03-05 PROCEDURE — 99232 SBSQ HOSP IP/OBS MODERATE 35: CPT | Performed by: INTERNAL MEDICINE

## 2020-03-05 PROCEDURE — 2700000000 HC OXYGEN THERAPY PER DAY

## 2020-03-05 PROCEDURE — 2060000000 HC ICU INTERMEDIATE R&B

## 2020-03-05 PROCEDURE — 97535 SELF CARE MNGMENT TRAINING: CPT

## 2020-03-05 PROCEDURE — 94640 AIRWAY INHALATION TREATMENT: CPT

## 2020-03-05 PROCEDURE — 6360000002 HC RX W HCPCS: Performed by: NURSE PRACTITIONER

## 2020-03-05 RX ORDER — GUAIFENESIN/DEXTROMETHORPHAN 100-10MG/5
10 SYRUP ORAL EVERY 4 HOURS PRN
Status: DISCONTINUED | OUTPATIENT
Start: 2020-03-05 | End: 2020-03-07 | Stop reason: HOSPADM

## 2020-03-05 RX ORDER — ALBUTEROL SULFATE 90 UG/1
2 AEROSOL, METERED RESPIRATORY (INHALATION) EVERY 6 HOURS PRN
COMMUNITY
End: 2020-06-08

## 2020-03-05 RX ORDER — DOXYCYCLINE HYCLATE 100 MG
100 TABLET ORAL 2 TIMES DAILY
Status: ON HOLD | COMMUNITY
End: 2020-03-07 | Stop reason: HOSPADM

## 2020-03-05 RX ORDER — BENZONATATE 200 MG/1
200 CAPSULE ORAL 3 TIMES DAILY PRN
COMMUNITY
End: 2020-06-08

## 2020-03-05 RX ORDER — ALBUTEROL SULFATE 2.5 MG/3ML
2.5 SOLUTION RESPIRATORY (INHALATION)
Status: DISCONTINUED | OUTPATIENT
Start: 2020-03-05 | End: 2020-03-05

## 2020-03-05 RX ORDER — PREDNISONE 20 MG/1
20 TABLET ORAL 2 TIMES DAILY
COMMUNITY
End: 2020-06-08

## 2020-03-05 RX ORDER — BENZONATATE 100 MG/1
100 CAPSULE ORAL 3 TIMES DAILY PRN
Status: DISCONTINUED | OUTPATIENT
Start: 2020-03-05 | End: 2020-03-07 | Stop reason: HOSPADM

## 2020-03-05 RX ORDER — IPRATROPIUM BROMIDE AND ALBUTEROL SULFATE 2.5; .5 MG/3ML; MG/3ML
1 SOLUTION RESPIRATORY (INHALATION)
Status: DISCONTINUED | OUTPATIENT
Start: 2020-03-05 | End: 2020-03-07 | Stop reason: HOSPADM

## 2020-03-05 RX ORDER — ALBUTEROL SULFATE 2.5 MG/3ML
2.5 SOLUTION RESPIRATORY (INHALATION) EVERY 6 HOURS PRN
Status: DISCONTINUED | OUTPATIENT
Start: 2020-03-05 | End: 2020-03-07 | Stop reason: HOSPADM

## 2020-03-05 RX ADMIN — IPRATROPIUM BROMIDE AND ALBUTEROL SULFATE 1 AMPULE: .5; 3 SOLUTION RESPIRATORY (INHALATION) at 11:49

## 2020-03-05 RX ADMIN — METHYLPREDNISOLONE SODIUM SUCCINATE 40 MG: 40 INJECTION, POWDER, FOR SOLUTION INTRAMUSCULAR; INTRAVENOUS at 17:20

## 2020-03-05 RX ADMIN — INSULIN LISPRO 1 UNITS: 100 INJECTION, SOLUTION INTRAVENOUS; SUBCUTANEOUS at 20:42

## 2020-03-05 RX ADMIN — AZITHROMYCIN MONOHYDRATE 500 MG: 500 INJECTION, POWDER, LYOPHILIZED, FOR SOLUTION INTRAVENOUS at 22:00

## 2020-03-05 RX ADMIN — GUAIFENESIN AND DEXTROMETHORPHAN 10 ML: 100; 10 SYRUP ORAL at 05:13

## 2020-03-05 RX ADMIN — ATORVASTATIN CALCIUM 40 MG: 40 TABLET, FILM COATED ORAL at 08:39

## 2020-03-05 RX ADMIN — METOPROLOL SUCCINATE 25 MG: 25 TABLET, FILM COATED, EXTENDED RELEASE ORAL at 08:57

## 2020-03-05 RX ADMIN — IPRATROPIUM BROMIDE AND ALBUTEROL SULFATE 1 AMPULE: .5; 3 SOLUTION RESPIRATORY (INHALATION) at 19:33

## 2020-03-05 RX ADMIN — ASPIRIN 325 MG: 325 TABLET, DELAYED RELEASE ORAL at 08:39

## 2020-03-05 RX ADMIN — PANTOPRAZOLE SODIUM 40 MG: 40 TABLET, DELAYED RELEASE ORAL at 08:39

## 2020-03-05 RX ADMIN — IPRATROPIUM BROMIDE AND ALBUTEROL SULFATE 1 AMPULE: .5; 3 SOLUTION RESPIRATORY (INHALATION) at 15:43

## 2020-03-05 RX ADMIN — CEFTRIAXONE SODIUM 1 G: 1 INJECTION, POWDER, FOR SOLUTION INTRAMUSCULAR; INTRAVENOUS at 20:43

## 2020-03-05 RX ADMIN — IPRATROPIUM BROMIDE AND ALBUTEROL SULFATE 1 AMPULE: .5; 3 SOLUTION RESPIRATORY (INHALATION) at 08:25

## 2020-03-05 RX ADMIN — METHYLPREDNISOLONE SODIUM SUCCINATE 40 MG: 40 INJECTION, POWDER, FOR SOLUTION INTRAMUSCULAR; INTRAVENOUS at 08:39

## 2020-03-05 ASSESSMENT — PAIN SCALES - GENERAL: PAINLEVEL_OUTOF10: 0

## 2020-03-05 NOTE — PROGRESS NOTES
WBC 10.1 9.2 15.8*   RBC 4.47 3.67* 3.81*   HGB 13.9 11.4* 11.8*   HCT 42.4 35.6* 37.0*   MCV 94.9 97.0 97.1   MCH 31.1 31.1 31.0   MCHC 32.8 32.0 31.9   RDW 12.9 13.0 13.1   * 84* 88*   MPV 10.4 10.6 11.2   INR  --  1.0  --      Chemistry:  Recent Labs     03/03/20  1835 03/04/20  0455 03/05/20  0454   * 135 137   K 4.0 4.2 4.8   CL 95* 102 104   CO2 22 21 25   GLUCOSE 114* 189* 204*   BUN 19 22 17   CREATININE 1.09 1.04 0.82   MG  --  2.1  --    ANIONGAP 16 12 8*   LABGLOM >60 >60 >60   GFRAA >60 >60 >60   CALCIUM 8.7 7.6* 8.1*   CAION  --  1.09*  --    PHOS  --  4.3  --    PROBNP 710*  --   --    TROPHS 22  --   --      Recent Labs     03/04/20  0455 03/04/20  1708 03/04/20  2140 03/05/20  0755   PROT 5.7*  --   --   --    LABALBU 3.0*  --   --   --    AST 24  --   --   --    ALT 17  --   --   --    ALKPHOS 41  --   --   --    BILITOT 0.33  --   --   --    POCGLU  --  149* 228* 132*     ABG:  Lab Results   Component Value Date    POCPH 7.43 03/03/2020    POCPCO2 31 03/03/2020    POCPO2 55 03/03/2020    POCHCO3 20.3 03/03/2020    NBEA 4 03/03/2020    PBEA NOT REPORTED 03/03/2020    VZI9ITY 21 03/03/2020    IJVE5TUQ 89 03/03/2020    FIO2 50.0 03/03/2020     Lab Results   Component Value Date/Time    SPECIAL LT AC, 10 ML 03/03/2020 11:52 PM     Lab Results   Component Value Date/Time    CULTURE NO GROWTH 1 DAY 03/03/2020 11:52 PM       Radiology:  Xr Chest Portable    Result Date: 3/5/2020  Improving right perihilar/upper lobe airspace opacity. Mild generalized interstitial thickening. Xr Chest Portable    Result Date: 3/3/2020  Possible right upper lobe airspace opacity could represent pneumonia     Ct Chest Pulmonary Embolism W Contrast    Result Date: 3/3/2020  Airspace disease and mediastinal lymphadenopathy most likely infectious in etiology. Follow-up to resolution recommended. Clinical correlation required.        Physical Examination:        General appearance:  alert, cooperative and no distress  Mental Status:  oriented to person, place and time and normal affect  Lungs: Diminished airflow with bilateral rhonchi, normal effort  Heart:  regular rate and rhythm, no murmur  Abdomen:  soft, nontender, nondistended, normal bowel sounds, no masses, hepatomegaly, splenomegaly  Extremities:  no edema, redness, tenderness in the calves  Skin:  no gross lesions, rashes, induration    Assessment:        Hospital Problems           Last Modified POA    * (Principal) Pneumonia of right upper lobe due to infectious organism ([address] 3/4/2020 Yes    Essential hypertension 3/4/2020 Yes    Coronary artery disease involving native coronary artery of native heart without angina pectoris 3/4/2020 Yes    Tobacco abuse (Chronic) 3/4/2020 Yes    Acute hypoxemic respiratory failure ([address] 3/4/2020 Yes    Normocytic anemia 3/4/2020 Yes    Mediastinal lymphadenopathy 3/4/2020 Yes    Hyperglycemia 3/4/2020 Yes    Thrombocytopenia ([address] 3/4/2020 Yes    Human metapneumovirus (hMPV) pneumonia 3/5/2020 Yes          Plan:        1. Oxygen as ordered  2. Aerosol protocol  3. Continue IV antibiotics pending cultures  4. Monitor and control blood pressure  5. Insulin scale  6. Steroids as ordered  7. Follow labs, monitor platelets, thrombocytopenia likely due to infection. 8. DVT prophylaxis  9.  Transfer out of ICU    Ele Vitale DO  3/5/2020  11:59 AM

## 2020-03-06 LAB
ABSOLUTE EOS #: 0 K/UL (ref 0–0.4)
ABSOLUTE IMMATURE GRANULOCYTE: 0.18 K/UL (ref 0–0.3)
ABSOLUTE LYMPH #: 5.87 K/UL (ref 1–4.8)
ABSOLUTE MONO #: 0.27 K/UL (ref 0.2–0.8)
ANION GAP SERPL CALCULATED.3IONS-SCNC: 8 MMOL/L (ref 9–17)
BASOPHILS # BLD: 0 %
BASOPHILS ABSOLUTE: 0 K/UL (ref 0–0.2)
BUN BLDV-MCNC: 14 MG/DL (ref 8–23)
BUN/CREAT BLD: 20 (ref 9–20)
CALCIUM SERPL-MCNC: 7.8 MG/DL (ref 8.6–10.4)
CHLORIDE BLD-SCNC: 101 MMOL/L (ref 98–107)
CO2: 25 MMOL/L (ref 20–31)
CREAT SERPL-MCNC: 0.69 MG/DL (ref 0.7–1.2)
DIFFERENTIAL TYPE: ABNORMAL
EOSINOPHILS RELATIVE PERCENT: 0 % (ref 1–4)
GFR AFRICAN AMERICAN: >60 ML/MIN
GFR NON-AFRICAN AMERICAN: >60 ML/MIN
GFR SERPL CREATININE-BSD FRML MDRD: ABNORMAL ML/MIN/{1.73_M2}
GFR SERPL CREATININE-BSD FRML MDRD: ABNORMAL ML/MIN/{1.73_M2}
GLUCOSE BLD-MCNC: 173 MG/DL (ref 75–110)
GLUCOSE BLD-MCNC: 184 MG/DL (ref 70–99)
GLUCOSE BLD-MCNC: 212 MG/DL (ref 75–110)
GLUCOSE BLD-MCNC: 234 MG/DL (ref 75–110)
GLUCOSE BLD-MCNC: 257 MG/DL (ref 75–110)
HCT VFR BLD CALC: 33.3 % (ref 40.7–50.3)
HEMOGLOBIN: 10.5 G/DL (ref 13–17)
IMMATURE GRANULOCYTES: 2 %
LYMPHOCYTES # BLD: 66 % (ref 24–44)
MCH RBC QN AUTO: 30.7 PG (ref 25.2–33.5)
MCHC RBC AUTO-ENTMCNC: 31.5 G/DL (ref 28.4–34.8)
MCV RBC AUTO: 97.4 FL (ref 82.6–102.9)
MONOCYTES # BLD: 3 % (ref 1–7)
NRBC AUTOMATED: 0 PER 100 WBC
PDW BLD-RTO: 13.2 % (ref 11.8–14.4)
PLATELET # BLD: 80 K/UL (ref 138–453)
PLATELET ESTIMATE: ABNORMAL
PMV BLD AUTO: 11.3 FL (ref 8.1–13.5)
POTASSIUM SERPL-SCNC: 4.3 MMOL/L (ref 3.7–5.3)
RBC # BLD: 3.42 M/UL (ref 4.21–5.77)
RBC # BLD: ABNORMAL 10*6/UL
SEG NEUTROPHILS: 29 % (ref 36–66)
SEGMENTED NEUTROPHILS ABSOLUTE COUNT: 2.58 K/UL (ref 1.8–7.7)
SODIUM BLD-SCNC: 134 MMOL/L (ref 135–144)
WBC # BLD: 8.9 K/UL (ref 3.5–11.3)
WBC # BLD: ABNORMAL 10*3/UL

## 2020-03-06 PROCEDURE — 97110 THERAPEUTIC EXERCISES: CPT

## 2020-03-06 PROCEDURE — 99232 SBSQ HOSP IP/OBS MODERATE 35: CPT | Performed by: INTERNAL MEDICINE

## 2020-03-06 PROCEDURE — 80048 BASIC METABOLIC PNL TOTAL CA: CPT

## 2020-03-06 PROCEDURE — 6370000000 HC RX 637 (ALT 250 FOR IP): Performed by: INTERNAL MEDICINE

## 2020-03-06 PROCEDURE — 2700000000 HC OXYGEN THERAPY PER DAY

## 2020-03-06 PROCEDURE — 6360000002 HC RX W HCPCS: Performed by: NURSE PRACTITIONER

## 2020-03-06 PROCEDURE — 94640 AIRWAY INHALATION TREATMENT: CPT

## 2020-03-06 PROCEDURE — 94761 N-INVAS EAR/PLS OXIMETRY MLT: CPT

## 2020-03-06 PROCEDURE — 36415 COLL VENOUS BLD VENIPUNCTURE: CPT

## 2020-03-06 PROCEDURE — 87205 SMEAR GRAM STAIN: CPT

## 2020-03-06 PROCEDURE — 97116 GAIT TRAINING THERAPY: CPT

## 2020-03-06 PROCEDURE — 2580000003 HC RX 258: Performed by: INTERNAL MEDICINE

## 2020-03-06 PROCEDURE — 2580000003 HC RX 258: Performed by: NURSE PRACTITIONER

## 2020-03-06 PROCEDURE — 94660 CPAP INITIATION&MGMT: CPT

## 2020-03-06 PROCEDURE — 2060000000 HC ICU INTERMEDIATE R&B

## 2020-03-06 PROCEDURE — 87070 CULTURE OTHR SPECIMN AEROBIC: CPT

## 2020-03-06 PROCEDURE — 6370000000 HC RX 637 (ALT 250 FOR IP): Performed by: NURSE PRACTITIONER

## 2020-03-06 PROCEDURE — 6360000002 HC RX W HCPCS: Performed by: INTERNAL MEDICINE

## 2020-03-06 PROCEDURE — 82947 ASSAY GLUCOSE BLOOD QUANT: CPT

## 2020-03-06 PROCEDURE — 85025 COMPLETE CBC W/AUTO DIFF WBC: CPT

## 2020-03-06 PROCEDURE — 2T015 HOSPITALIST 2ND TOUCH: CPT | Performed by: INTERNAL MEDICINE

## 2020-03-06 RX ADMIN — METHYLPREDNISOLONE SODIUM SUCCINATE 40 MG: 40 INJECTION, POWDER, FOR SOLUTION INTRAMUSCULAR; INTRAVENOUS at 09:02

## 2020-03-06 RX ADMIN — ASPIRIN 325 MG: 325 TABLET, DELAYED RELEASE ORAL at 09:02

## 2020-03-06 RX ADMIN — IPRATROPIUM BROMIDE AND ALBUTEROL SULFATE 1 AMPULE: .5; 3 SOLUTION RESPIRATORY (INHALATION) at 19:56

## 2020-03-06 RX ADMIN — METHYLPREDNISOLONE SODIUM SUCCINATE 40 MG: 40 INJECTION, POWDER, FOR SOLUTION INTRAMUSCULAR; INTRAVENOUS at 00:00

## 2020-03-06 RX ADMIN — INSULIN LISPRO 2 UNITS: 100 INJECTION, SOLUTION INTRAVENOUS; SUBCUTANEOUS at 17:48

## 2020-03-06 RX ADMIN — GUAIFENESIN AND DEXTROMETHORPHAN 10 ML: 100; 10 SYRUP ORAL at 09:57

## 2020-03-06 RX ADMIN — INSULIN LISPRO 1 UNITS: 100 INJECTION, SOLUTION INTRAVENOUS; SUBCUTANEOUS at 09:03

## 2020-03-06 RX ADMIN — METHYLPREDNISOLONE SODIUM SUCCINATE 40 MG: 40 INJECTION, POWDER, FOR SOLUTION INTRAMUSCULAR; INTRAVENOUS at 17:48

## 2020-03-06 RX ADMIN — SODIUM CHLORIDE: 9 INJECTION, SOLUTION INTRAVENOUS at 04:40

## 2020-03-06 RX ADMIN — ATORVASTATIN CALCIUM 40 MG: 40 TABLET, FILM COATED ORAL at 09:02

## 2020-03-06 RX ADMIN — IPRATROPIUM BROMIDE AND ALBUTEROL SULFATE 1 AMPULE: .5; 3 SOLUTION RESPIRATORY (INHALATION) at 11:48

## 2020-03-06 RX ADMIN — IPRATROPIUM BROMIDE AND ALBUTEROL SULFATE 1 AMPULE: .5; 3 SOLUTION RESPIRATORY (INHALATION) at 08:14

## 2020-03-06 RX ADMIN — IPRATROPIUM BROMIDE AND ALBUTEROL SULFATE 1 AMPULE: .5; 3 SOLUTION RESPIRATORY (INHALATION) at 15:22

## 2020-03-06 RX ADMIN — PANTOPRAZOLE SODIUM 40 MG: 40 TABLET, DELAYED RELEASE ORAL at 06:07

## 2020-03-06 RX ADMIN — INSULIN LISPRO 3 UNITS: 100 INJECTION, SOLUTION INTRAVENOUS; SUBCUTANEOUS at 12:01

## 2020-03-06 RX ADMIN — CEFTRIAXONE SODIUM 1 G: 1 INJECTION, POWDER, FOR SOLUTION INTRAMUSCULAR; INTRAVENOUS at 20:29

## 2020-03-06 RX ADMIN — Medication 10 ML: at 20:29

## 2020-03-06 RX ADMIN — LISINOPRIL 5 MG: 5 TABLET ORAL at 09:02

## 2020-03-06 RX ADMIN — AZITHROMYCIN MONOHYDRATE 500 MG: 500 INJECTION, POWDER, LYOPHILIZED, FOR SOLUTION INTRAVENOUS at 21:33

## 2020-03-06 RX ADMIN — METOPROLOL SUCCINATE 25 MG: 25 TABLET, FILM COATED, EXTENDED RELEASE ORAL at 09:02

## 2020-03-06 RX ADMIN — INSULIN LISPRO 1 UNITS: 100 INJECTION, SOLUTION INTRAVENOUS; SUBCUTANEOUS at 20:29

## 2020-03-06 ASSESSMENT — PAIN SCALES - GENERAL
PAINLEVEL_OUTOF10: 0

## 2020-03-06 NOTE — PLAN OF CARE
[facility]    Second Visit Note  For more detailed information please refer to the progress note of the day      3/6/2020    6:19 PM    Name:   Katina Jackson  MRN:     [id number]     Andreiaide:      [de-identified]   Room:   1016/1016-02   Day:  3  Admit Date:  3/3/2020  6:29 PM    PCP:   Ana Henderson MD  Code Status:  Full Code      Pt vitals were reviewed   New labs were reviewed   Patient was seen    Updated plan :     1. Oxygen has been weaned throughout the day from 5 L to 2 L with maintenance of oxygen saturation above 90%. Patient overall continues to feel better.   Discussed with spouse at bedside, questions answered        Ulysses Rey DO  3/6/2020  6:19 PM

## 2020-03-06 NOTE — PROGRESS NOTES
[facility]    Progress Note    3/6/2020    8:03 AM    Name:   Ce Singleton  MRN:     [id number]     [place]:      [de-identified]   Room:   [address] [place] [address] Day:  3  Admit Date:  3/3/2020  6:29 PM    PCP:   Kun Triplett MD  Code Status:  Full Code    Subjective:     C/C:   Chief Complaint   Patient presents with    Cough     productive cough    Nasal Congestion    Wheezing    Chest Pain     pressure. Worsens w/cough     Interval History Status: improved. Patient with decreasing shortness of breath and oxygen requirement, now off high flow and on nasal cannula. Denies any chest pain, fevers or chills, nausea or vomiting. Continued cough with intermittent sputum production    Brief History: This is a 72-year-old white male who is admitted with a 5 to 6-day history of cough, congestion, wheezing and chest pressure. Evaluation is found to have infiltrate of the right upper lobe consistent with pneumonia. Is been admitted to the hospital placed on high flow oxygen/BiPAP as needed for support as well as IV antibiotics. He underwent a viral PCR which was positive for human Metapneumonia virus and procalcitonin testing which was slightly elevated at 0.27 which is borderline for bacterial infection. Antibiotics will be continued pending culture data. Review of Systems:     Constitutional:  negative for chills, fevers, sweats  Respiratory: Positive for cough, dyspnea on exertion, shortness of breath, wheezing  Cardiovascular:  negative for chest pain, chest pressure/discomfort, lower extremity edema, palpitations  Gastrointestinal:  negative for abdominal pain, constipation, diarrhea, nausea, vomiting  Neurological:  negative for dizziness, headache    Medications:      Allergies:  No Known Allergies    Current Meds:   Scheduled Meds:    ipratropium-albuterol  1 ampule Inhalation Q4H WA    methylPREDNISolone  40 mg Intravenous Q8H    insulin

## 2020-03-06 NOTE — PROGRESS NOTES
Physical Therapy  Facility/Department: [facility] PROGRESSIVE CARE  Daily Treatment Note  NAME: Karlee Pond  : 1950  MRN: [id number]    Date of Service: 3/6/2020    Discharge Recommendations:  Home with assist PRN(Pulmonary Rehab)        Assessment   Body structures, Functions, Activity limitations: Decreased endurance  Decision Making: High Complexity  REQUIRES PT FOLLOW UP: Yes     Patient Diagnosis(es): The primary encounter diagnosis was Pneumonia due to organism. Diagnoses of Acute respiratory failure with hypoxia (HCC), Cough, Mild intermittent reactive airway disease with acute exacerbation, Chest wall pain, and Septicemia ([facility] [address] were also pertinent to this visit. has a past medical history of CAD (coronary artery disease), History of heart artery stent, Hyperlipidemia, and Lymphoma ([facility] [address] has a past surgical history that includes Lumbar disc surgery; Hydrocele surgery (Left); arthrotomy; lymph node dissection; Finger amputation (Left); Rectal surgery; and Carotid endarterectomy (Left, 2014). Restrictions  Restrictions/Precautions  Restrictions/Precautions: General Precautions, Fall Risk, Up as Tolerated  Required Braces or Orthoses?: No  Position Activity Restriction  Other position/activity restrictions: 4L O2, RUE IV   Subjective   General  Response To Previous Treatment: Not applicable  Family / Caregiver Present: No  Subjective  Subjective: Patient up in bed agreeable to PT treatment.   General Comment  Comments: OK for PT per Deshawn Parnell RN monitor SPO2% as it has been turned down to 4 L/min  Pain Screening  Patient Currently in Pain: Denies  Vital Signs  Patient Currently in Pain: Denies  Oxygen Therapy  SpO2: 93 %  Pulse Oximeter Device Mode: Continuous  O2 Device: Nasal cannula  O2 Flow Rate (L/min): 4 L/min       Orientation  Orientation  Overall Orientation Status: Within Normal Limits  Cognition      Objective   Bed mobility  Scooting: Modified independent  Transfers  Sit to Stand: Contact guard assistance  Stand to sit: Contact guard assistance  Stand Pivot Transfers: Contact guard assistance  Comment: Patient had 1 LOB during Dynamic standing activities able to self recover  Ambulation  Ambulation?: Yes  Ambulation 1  Surface: level tile  Device: No Device  Other Apparatus: O2  Assistance: Contact guard assistance  Distance: 40 feet x1 walked around bed several times due to lines. Comments: Patient had 1 LOB moment and was able to self recover. Patient oxygen level destats to 90% but able to recover to 93 with standing rest breaks. Education given for pursed lip breathing technique to conserve endurance. Stairs/Curb  Stairs?: No     Balance  Posture: Good  Sitting - Static: Good  Sitting - Dynamic: Good  Standing - Static: Good  Standing - Dynamic: Good;-  Comments: Patient displays a slightly forward flexed posture during ambulation activities but corrects with verbal cues. Exercises  Comments: Patient completed JENNIFER LE exercise 15 reps x 1 set to facilitate motion and promote dynamic activities. AROM RLE (degrees)  RLE AROM: WNL  AROM LLE (degrees)  LLE AROM : WNL  Strength RLE  Strength RLE: WFL  Strength LLE  Strength LLE: [facility] Score  -PAC Inpatient Mobility Raw Score : 22 (03/06/20 1142)  AM-PAC Inpatient T-Scale Score : 53.28 (03/06/20 1142)  Mobility Inpatient CMS 0-100% Score: 20.91 (03/06/20 1142)  Mobility Inpatient CMS G-Code Modifier : CJ (03/06/20 1142)          Goals  Short term goals  Time Frame for Short term goals:  8 treatments  Short term goal 1: Independent transfers  Short term goal 2: Independent ambulation 200' x 1 w/ appropriate assistive device  Short term goal 3: Tolerate 30 min ther act  Short term goal 4:  Independently ascend/descend 2 steps w/ B HR  Patient Goals   Patient goals : Home    Plan    Plan  Times per week: 1-2x/day,6-7 days/week  Current Treatment Recommendations: Transfer Training, Gait Training, Stair training, Endurance

## 2020-03-07 VITALS
BODY MASS INDEX: 26.86 KG/M2 | TEMPERATURE: 97.3 F | OXYGEN SATURATION: 94 % | SYSTOLIC BLOOD PRESSURE: 112 MMHG | WEIGHT: 187.6 LBS | RESPIRATION RATE: 16 BRPM | HEART RATE: 74 BPM | HEIGHT: 70 IN | DIASTOLIC BLOOD PRESSURE: 51 MMHG

## 2020-03-07 PROBLEM — J44.9 SUSPECTED CHRONIC OBSTRUCTIVE PULMONARY DISEASE BASED ON INITIAL EVALUATION (HCC): Status: ACTIVE | Noted: 2020-03-07

## 2020-03-07 LAB
ABSOLUTE EOS #: 0 K/UL (ref 0–0.4)
ABSOLUTE IMMATURE GRANULOCYTE: 0.2 K/UL (ref 0–0.3)
ABSOLUTE LYMPH #: 7.04 K/UL (ref 1–4.8)
ABSOLUTE MONO #: 0.31 K/UL (ref 0.2–0.8)
ANION GAP SERPL CALCULATED.3IONS-SCNC: 10 MMOL/L (ref 9–17)
BASOPHILS # BLD: 0 %
BASOPHILS ABSOLUTE: 0 K/UL (ref 0–0.2)
BUN BLDV-MCNC: 11 MG/DL (ref 8–23)
BUN/CREAT BLD: 18 (ref 9–20)
CALCIUM SERPL-MCNC: 8.4 MG/DL (ref 8.6–10.4)
CHLORIDE BLD-SCNC: 101 MMOL/L (ref 98–107)
CO2: 28 MMOL/L (ref 20–31)
CREAT SERPL-MCNC: 0.61 MG/DL (ref 0.7–1.2)
DIFFERENTIAL TYPE: ABNORMAL
EOSINOPHILS RELATIVE PERCENT: 0 % (ref 1–4)
GFR AFRICAN AMERICAN: >60 ML/MIN
GFR NON-AFRICAN AMERICAN: >60 ML/MIN
GFR SERPL CREATININE-BSD FRML MDRD: ABNORMAL ML/MIN/{1.73_M2}
GFR SERPL CREATININE-BSD FRML MDRD: ABNORMAL ML/MIN/{1.73_M2}
GLUCOSE BLD-MCNC: 149 MG/DL (ref 75–110)
GLUCOSE BLD-MCNC: 176 MG/DL (ref 70–99)
GLUCOSE BLD-MCNC: 217 MG/DL (ref 75–110)
HCT VFR BLD CALC: 35.2 % (ref 40.7–50.3)
HEMOGLOBIN: 11.2 G/DL (ref 13–17)
IMMATURE GRANULOCYTES: 2 %
LYMPHOCYTES # BLD: 69 % (ref 24–44)
MCH RBC QN AUTO: 30.7 PG (ref 25.2–33.5)
MCHC RBC AUTO-ENTMCNC: 31.8 G/DL (ref 28.4–34.8)
MCV RBC AUTO: 96.4 FL (ref 82.6–102.9)
MONOCYTES # BLD: 3 % (ref 1–7)
MORPHOLOGY: ABNORMAL
NRBC AUTOMATED: 0 PER 100 WBC
PDW BLD-RTO: 12.8 % (ref 11.8–14.4)
PLATELET # BLD: 105 K/UL (ref 138–453)
PLATELET ESTIMATE: ABNORMAL
PMV BLD AUTO: 11.4 FL (ref 8.1–13.5)
POTASSIUM SERPL-SCNC: 4.1 MMOL/L (ref 3.7–5.3)
RBC # BLD: 3.65 M/UL (ref 4.21–5.77)
RBC # BLD: ABNORMAL 10*6/UL
SEG NEUTROPHILS: 26 % (ref 36–66)
SEGMENTED NEUTROPHILS ABSOLUTE COUNT: 2.65 K/UL (ref 1.8–7.7)
SODIUM BLD-SCNC: 139 MMOL/L (ref 135–144)
WBC # BLD: 10.2 K/UL (ref 3.5–11.3)
WBC # BLD: ABNORMAL 10*3/UL

## 2020-03-07 PROCEDURE — 6370000000 HC RX 637 (ALT 250 FOR IP): Performed by: NURSE PRACTITIONER

## 2020-03-07 PROCEDURE — 94660 CPAP INITIATION&MGMT: CPT

## 2020-03-07 PROCEDURE — 6370000000 HC RX 637 (ALT 250 FOR IP): Performed by: INTERNAL MEDICINE

## 2020-03-07 PROCEDURE — 94761 N-INVAS EAR/PLS OXIMETRY MLT: CPT

## 2020-03-07 PROCEDURE — 80048 BASIC METABOLIC PNL TOTAL CA: CPT

## 2020-03-07 PROCEDURE — 85025 COMPLETE CBC W/AUTO DIFF WBC: CPT

## 2020-03-07 PROCEDURE — 2700000000 HC OXYGEN THERAPY PER DAY

## 2020-03-07 PROCEDURE — 2580000003 HC RX 258: Performed by: NURSE PRACTITIONER

## 2020-03-07 PROCEDURE — 82947 ASSAY GLUCOSE BLOOD QUANT: CPT

## 2020-03-07 PROCEDURE — 99238 HOSP IP/OBS DSCHRG MGMT 30/<: CPT | Performed by: INTERNAL MEDICINE

## 2020-03-07 PROCEDURE — 6360000002 HC RX W HCPCS: Performed by: INTERNAL MEDICINE

## 2020-03-07 PROCEDURE — 36415 COLL VENOUS BLD VENIPUNCTURE: CPT

## 2020-03-07 PROCEDURE — 94640 AIRWAY INHALATION TREATMENT: CPT

## 2020-03-07 RX ORDER — PREDNISONE 20 MG/1
40 TABLET ORAL DAILY
Status: DISCONTINUED | OUTPATIENT
Start: 2020-03-07 | End: 2020-03-07 | Stop reason: HOSPADM

## 2020-03-07 RX ORDER — BUDESONIDE AND FORMOTEROL FUMARATE DIHYDRATE 160; 4.5 UG/1; UG/1
2 AEROSOL RESPIRATORY (INHALATION) 2 TIMES DAILY
Qty: 1 INHALER | Refills: 0 | Status: SHIPPED | OUTPATIENT
Start: 2020-03-07 | End: 2020-06-08

## 2020-03-07 RX ADMIN — METOPROLOL SUCCINATE 25 MG: 25 TABLET, FILM COATED, EXTENDED RELEASE ORAL at 09:03

## 2020-03-07 RX ADMIN — ATORVASTATIN CALCIUM 40 MG: 40 TABLET, FILM COATED ORAL at 09:03

## 2020-03-07 RX ADMIN — IPRATROPIUM BROMIDE AND ALBUTEROL SULFATE 1 AMPULE: .5; 3 SOLUTION RESPIRATORY (INHALATION) at 11:16

## 2020-03-07 RX ADMIN — INSULIN LISPRO 3 UNITS: 100 INJECTION, SOLUTION INTRAVENOUS; SUBCUTANEOUS at 09:03

## 2020-03-07 RX ADMIN — METHYLPREDNISOLONE SODIUM SUCCINATE 40 MG: 40 INJECTION, POWDER, FOR SOLUTION INTRAMUSCULAR; INTRAVENOUS at 00:00

## 2020-03-07 RX ADMIN — PANTOPRAZOLE SODIUM 40 MG: 40 TABLET, DELAYED RELEASE ORAL at 05:31

## 2020-03-07 RX ADMIN — ASPIRIN 325 MG: 325 TABLET, DELAYED RELEASE ORAL at 09:03

## 2020-03-07 RX ADMIN — METHYLPREDNISOLONE SODIUM SUCCINATE 40 MG: 40 INJECTION, POWDER, FOR SOLUTION INTRAMUSCULAR; INTRAVENOUS at 09:03

## 2020-03-07 RX ADMIN — PREDNISONE 40 MG: 20 TABLET ORAL at 12:51

## 2020-03-07 RX ADMIN — IPRATROPIUM BROMIDE AND ALBUTEROL SULFATE 1 AMPULE: .5; 3 SOLUTION RESPIRATORY (INHALATION) at 08:13

## 2020-03-07 RX ADMIN — LISINOPRIL 5 MG: 5 TABLET ORAL at 09:03

## 2020-03-07 RX ADMIN — Medication 10 ML: at 09:04

## 2020-03-07 RX ADMIN — INSULIN LISPRO 2 UNITS: 100 INJECTION, SOLUTION INTRAVENOUS; SUBCUTANEOUS at 12:51

## 2020-03-07 ASSESSMENT — PAIN SCALES - GENERAL
PAINLEVEL_OUTOF10: 0

## 2020-03-07 NOTE — PROGRESS NOTES
3.65*   HGB 11.8* 10.5* 11.2*   HCT 37.0* 33.3* 35.2*   MCV 97.1 97.4 96.4   MCH 31.0 30.7 30.7   MCHC 31.9 31.5 31.8   RDW 13.1 13.2 12.8   PLT 88* 80* 105*   MPV 11.2 11.3 11.4     Chemistry:  Recent Labs     03/05/20  0454 03/06/20  0551 03/07/20  0534    134* 139   K 4.8 4.3 4.1    101 101   CO2 25 25 28   GLUCOSE 204* 184* 176*   BUN 17 14 11   CREATININE 0.82 0.69* 0.61*   ANIONGAP 8* 8* 10   LABGLOM >60 >60 >60   GFRAA >60 >60 >60   CALCIUM 8.1* 7.8* 8.4*     Recent Labs     03/05/20  1700 03/05/20 2018 03/06/20  1106 03/06/20  1714 03/06/20 2020 03/07/20  0726   POCGLU 137* 173* 257* 234* 212* 149*     ABG:  Lab Results   Component Value Date    POCPH 7.43 03/03/2020    POCPCO2 31 03/03/2020    POCPO2 55 03/03/2020    POCHCO3 20.3 03/03/2020    NBEA 4 03/03/2020    PBEA NOT REPORTED 03/03/2020    JNF9LRT 21 03/03/2020    GYPW5OVU 89 03/03/2020    FIO2 50.0 03/03/2020     Lab Results   Component Value Date/Time    SPECIAL NOT REPORTED 03/06/2020 04:29 AM     Lab Results   Component Value Date/Time    CULTURE PENDING 03/06/2020 04:29 AM       Radiology:  Xr Chest Portable    Result Date: 3/5/2020  Improving right perihilar/upper lobe airspace opacity. Mild generalized interstitial thickening. Xr Chest Portable    Result Date: 3/3/2020  Possible right upper lobe airspace opacity could represent pneumonia     Ct Chest Pulmonary Embolism W Contrast    Result Date: 3/3/2020  Airspace disease and mediastinal lymphadenopathy most likely infectious in etiology. Follow-up to resolution recommended. Clinical correlation required.        Physical Examination:        General appearance:  alert, cooperative and no distress  Mental Status:  oriented to person, place and time and normal affect  Lungs: Improving air entry, faint wheezing bilaterally, normal effort  Heart:  regular rate and rhythm, no murmur  Abdomen:  soft, nontender, nondistended, normal bowel sounds, no masses, hepatomegaly,

## 2020-03-07 NOTE — PLAN OF CARE
Problem: Falls - Risk of:  Goal: Will remain free from falls  Description  Will remain free from falls  3/6/2020 4903 by Tegan Kiser RN  Outcome: Ongoing  3/6/2020 1426 by Katie Mclean RN  Outcome: Ongoing  Note:   Siderails up x 2  Hourly rounding  Call light in reach  Instructed to call for assist before attempting out of bed.   Remains free from falls and accidental injury at this time   Floor free from obstacles  Bed is locked and in lowest position  Adequate lighting provided  Bed alarm on, [place] star and Stay with Me signs posted

## 2020-03-07 NOTE — DISCHARGE SUMMARY
Repeat CT scan chest in approximately 6 weeks to follow-up on adenopathy    Diet: cardiac diet    Activity: As tolerated    Instructions to Patient: Take medications as prescribed    Discharge Medications:      Medication List      START taking these medications    budesonide-formoterol 160-4.5 MCG/ACT Aero  Commonly known as:  Symbicort  Inhale 2 puffs into the lungs 2 times daily        CONTINUE taking these medications    albuterol sulfate  (90 Base) MCG/ACT inhaler     aspirin 325 MG EC tablet  Take 1 tablet by mouth daily. benzonatate 200 MG capsule  Commonly known as:  TESSALON     lisinopril 5 MG tablet  Commonly known as:  PRINIVIL;ZESTRIL     metoprolol succinate 25 MG extended release tablet  Commonly known as:  TOPROL XL     omeprazole 20 MG EC tablet     predniSONE 20 MG tablet  Commonly known as:  DELTASONE     simvastatin 40 MG tablet  Commonly known as:  ZOCOR        STOP taking these medications    doxycycline hyclate 100 MG tablet  Commonly known as:  VIBRA-TABS           Where to Get Your Medications      These medications were sent to [facility] [address] [place] [address], [place] [address]    Phone:  [phone]   · budesonide-formoterol 160-4.5 MCG/ACT Aero         No discharge procedures on file. Time Spent on discharge is  27 minutes in patient examination, evaluation, counseling as well as medication reconciliation, prescriptions for required medications, discharge plan and follow up. Electronically signed by   Mauricio Jurado DO  3/7/2020  9:33 AM      Thank you Dr. Breann Watts MD for the opportunity to be involved in this patient's care.

## 2020-03-07 NOTE — PROGRESS NOTES
Home Oxygen Evaluation    Home Oxygen Evaluation completed.     SpO2 on room air at rest 85%  SpO2 on 3 lpm nasal cannula at rest 91%    Testing done at [address] [place] [address]  9:42 AM

## 2020-03-07 NOTE — CARE COORDINATION
Discharge Planning    Pt's testing shows need for home oxygen. Pt agreeable to home oxygen. Orders and medical info faxed to [address] [place] [address] and spoke with  and he will deliver portable oxygen around 1-1:30pm today. Nurse Sheila Castano informed. Pt denies any other  Needs.

## 2020-03-07 NOTE — PROGRESS NOTES
Oxygen tank delivered. Patient and daughter educated    Pt ambulatory during discharged. Patient discharged via private auto with Daughter  Discharge paperwork and instructions given to patient and daughter. Patient  acknowledges understanding. Scripts e-scripted to patients pharmacy for symbicort  New medications and side effects explained. Patient wants a new PCP. PCP list given to patient. Directions to make follow up appointment in 1 week, after choosing new PCP. Discharge checklist completed     Any questions answered.      Telemetry monitor returned

## 2020-03-08 LAB
CULTURE: ABNORMAL
DIRECT EXAM: ABNORMAL
Lab: ABNORMAL
SPECIMEN DESCRIPTION: ABNORMAL

## 2020-03-09 LAB
CULTURE: NORMAL
CULTURE: NORMAL
Lab: NORMAL
Lab: NORMAL
SPECIMEN DESCRIPTION: NORMAL
SPECIMEN DESCRIPTION: NORMAL

## 2020-03-10 LAB
CULTURE: NORMAL
Lab: NORMAL
SPECIMEN DESCRIPTION: NORMAL

## 2020-03-11 ENCOUNTER — OFFICE VISIT (OUTPATIENT)
Dept: FAMILY MEDICINE CLINIC | Age: 70
End: 2020-03-11
Payer: MEDICARE

## 2020-03-11 VITALS
DIASTOLIC BLOOD PRESSURE: 72 MMHG | BODY MASS INDEX: 25.77 KG/M2 | TEMPERATURE: 97.6 F | WEIGHT: 180 LBS | HEART RATE: 67 BPM | SYSTOLIC BLOOD PRESSURE: 128 MMHG | HEIGHT: 70 IN

## 2020-03-11 PROCEDURE — 1111F DSCHRG MED/CURRENT MED MERGE: CPT | Performed by: NURSE PRACTITIONER

## 2020-03-11 PROCEDURE — 4004F PT TOBACCO SCREEN RCVD TLK: CPT | Performed by: NURSE PRACTITIONER

## 2020-03-11 PROCEDURE — G8419 CALC BMI OUT NRM PARAM NOF/U: HCPCS | Performed by: NURSE PRACTITIONER

## 2020-03-11 PROCEDURE — 4040F PNEUMOC VAC/ADMIN/RCVD: CPT | Performed by: NURSE PRACTITIONER

## 2020-03-11 PROCEDURE — 3017F COLORECTAL CA SCREEN DOC REV: CPT | Performed by: NURSE PRACTITIONER

## 2020-03-11 PROCEDURE — G8484 FLU IMMUNIZE NO ADMIN: HCPCS | Performed by: NURSE PRACTITIONER

## 2020-03-11 PROCEDURE — G8427 DOCREV CUR MEDS BY ELIG CLIN: HCPCS | Performed by: NURSE PRACTITIONER

## 2020-03-11 PROCEDURE — 99204 OFFICE O/P NEW MOD 45 MIN: CPT | Performed by: NURSE PRACTITIONER

## 2020-03-11 PROCEDURE — 1123F ACP DISCUSS/DSCN MKR DOCD: CPT | Performed by: NURSE PRACTITIONER

## 2020-03-11 SDOH — ECONOMIC STABILITY: FOOD INSECURITY: WITHIN THE PAST 12 MONTHS, YOU WORRIED THAT YOUR FOOD WOULD RUN OUT BEFORE YOU GOT MONEY TO BUY MORE.: PATIENT DECLINED

## 2020-03-11 SDOH — ECONOMIC STABILITY: FOOD INSECURITY: WITHIN THE PAST 12 MONTHS, THE FOOD YOU BOUGHT JUST DIDN'T LAST AND YOU DIDN'T HAVE MONEY TO GET MORE.: PATIENT DECLINED

## 2020-03-11 SDOH — ECONOMIC STABILITY: TRANSPORTATION INSECURITY
IN THE PAST 12 MONTHS, HAS LACK OF TRANSPORTATION KEPT YOU FROM MEETINGS, WORK, OR FROM GETTING THINGS NEEDED FOR DAILY LIVING?: PATIENT DECLINED

## 2020-03-11 SDOH — ECONOMIC STABILITY: TRANSPORTATION INSECURITY
IN THE PAST 12 MONTHS, HAS THE LACK OF TRANSPORTATION KEPT YOU FROM MEDICAL APPOINTMENTS OR FROM GETTING MEDICATIONS?: PATIENT DECLINED

## 2020-03-11 SDOH — ECONOMIC STABILITY: INCOME INSECURITY: HOW HARD IS IT FOR YOU TO PAY FOR THE VERY BASICS LIKE FOOD, HOUSING, MEDICAL CARE, AND HEATING?: PATIENT DECLINED

## 2020-03-11 ASSESSMENT — PATIENT HEALTH QUESTIONNAIRE - PHQ9
SUM OF ALL RESPONSES TO PHQ QUESTIONS 1-9: 0
SUM OF ALL RESPONSES TO PHQ QUESTIONS 1-9: 0
1. LITTLE INTEREST OR PLEASURE IN DOING THINGS: 0
SUM OF ALL RESPONSES TO PHQ9 QUESTIONS 1 & 2: 0
2. FEELING DOWN, DEPRESSED OR HOPELESS: 0

## 2020-03-11 NOTE — PROGRESS NOTES
Post-Discharge Transitional Care Management Services or Hospital Follow Up      Marcie Velasquez   YOB: 1950    Date of Office Visit:  3/11/2020  Date of Hospital Admission: 3/3/20  Date of Hospital Discharge: 3/7/20  Risk of hospital readmission (high >=14%. Medium >=10%) :Readmission Risk Score: 16      Care management risk score Rising risk (score 2-5) and Complex Care (Scores >=6): 1     Non face to face  following discharge, date last encounter closed (first attempt may have been earlier): *No documented post hospital discharge outreach found in the last 14 days    Call initiated 2 business days of discharge: *No response recorded in the last 14 days    Patient Active Problem List   Diagnosis    Essential hypertension    Carotid artery stenosis    GERD (gastroesophageal reflux disease)    Coronary artery disease involving native coronary artery of native heart without angina pectoris    Tobacco abuse    Back pain    Pneumonia of right upper lobe due to infectious organism ([address]    Acute hypoxemic respiratory failure ([address]    Normocytic anemia    Mediastinal lymphadenopathy    Hyperglycemia    Thrombocytopenia ([address]    Human metapneumovirus (hMPV) pneumonia    Suspected chronic obstructive pulmonary disease based on initial evaluation ([address]       No Known Allergies    Medications listed as ordered at the time of discharge from [facility] Medication Instructions FARHANA:    Printed on:03/11/20 1243   Medication Information                      albuterol sulfate  (90 Base) MCG/ACT inhaler  Inhale 2 puffs into the lungs every 6 hours as needed for Wheezing             aspirin 325 MG EC tablet  Take 1 tablet by mouth daily.              benzonatate (TESSALON) 200 MG capsule  Take 200 mg by mouth 3 times daily as needed for Cough             budesonide-formoterol (SYMBICORT) 160-4.5 MCG/ACT AERO  Inhale 2 puffs into the lungs 2 times daily lisinopril (PRINIVIL;ZESTRIL) 5 MG tablet  Take 5 mg by mouth daily. metoprolol (TOPROL-XL) 25 MG XL tablet  Take 25 mg by mouth daily. Omeprazole 20 MG TBEC  Take 20 tablets by mouth daily. predniSONE (DELTASONE) 20 MG tablet  Take 20 mg by mouth 2 times daily             simvastatin (ZOCOR) 40 MG tablet  Take 40 mg by mouth nightly                    Medications marked \"taking\" at this time  Outpatient Medications Marked as Taking for the 3/11/20 encounter (Office Visit) with LIZETH Chairez CNP   Medication Sig Dispense Refill    budesonide-formoterol (SYMBICORT) 160-4.5 MCG/ACT AERO Inhale 2 puffs into the lungs 2 times daily 1 Inhaler 0    albuterol sulfate  (90 Base) MCG/ACT inhaler Inhale 2 puffs into the lungs every 6 hours as needed for Wheezing      benzonatate (TESSALON) 200 MG capsule Take 200 mg by mouth 3 times daily as needed for Cough      predniSONE (DELTASONE) 20 MG tablet Take 20 mg by mouth 2 times daily      aspirin 325 MG EC tablet Take 1 tablet by mouth daily. 30 tablet 12    lisinopril (PRINIVIL;ZESTRIL) 5 MG tablet Take 5 mg by mouth daily.  simvastatin (ZOCOR) 40 MG tablet Take 40 mg by mouth nightly       Omeprazole 20 MG TBEC Take 20 tablets by mouth daily.  metoprolol (TOPROL-XL) 25 MG XL tablet Take 25 mg by mouth daily. Medications patient taking as of now reconciled against medications ordered at time of hospital discharge: Yes    Chief Complaint   Patient presents with    New Patient       History of Present illness - Follow up of Hospital diagnosis(es): pneumonia    Inpatient course: Discharge summary reviewed- see chart. Interval history/Current status: This patient is new to me - patient seems extremely confused and a very poor historian. He did not answer questions and stated \"I feel like I'm being muscled into something here\". Patient states he lives with daughter, but is here today by himself.  Chart reviewed thoroughly    A comprehensive review of systems was negative except for what was noted in the HPI. Vitals:    03/11/20 0903   BP: 128/72   Pulse: 67   Temp: 97.6 °F (36.4 °C)   TempSrc: Temporal   Weight: 180 lb (81.6 kg)   Height: 5' 10\" (1.778 m)     Body mass index is 25.83 kg/m². Wt Readings from Last 3 Encounters:   03/11/20 180 lb (81.6 kg)   03/07/20 187 lb 9.6 oz (85.1 kg)   04/07/14 186 lb (84.4 kg)     BP Readings from Last 3 Encounters:   03/11/20 128/72   03/07/20 (!) 112/51   04/07/14 117/53        Physical Exam:  General Appearance: alert and oriented to person, place and time, well developed and well- nourished, in no acute distress  Skin: warm and dry, no rash or erythema  Head: normocephalic and atraumatic  Eyes: pupils equal, round, and reactive to light, extraocular eye movements intact, conjunctivae normal  ENT: tympanic membrane, external ear and ear canal normal bilaterally, nose without deformity, nasal mucosa and turbinates normal without polyps  Neck: supple and non-tender without mass, no thyromegaly or thyroid nodules, no cervical lymphadenopathy  Pulmonary/Chest: clear to auscultation bilaterally- no wheezes, rales or rhonchi, normal air movement, no respiratory distress  Cardiovascular: normal rate, regular rhythm, normal S1 and S2, no murmurs, rubs, clicks, or gallops, distal pulses intact, no carotid bruits  Abdomen: soft, non-tender, non-distended, normal bowel sounds, no masses or organomegaly  Extremities: no cyanosis, clubbing or edema  Musculoskeletal: normal range of motion, no joint swelling, deformity or tenderness  Neurologic: reflexes normal and symmetric, no cranial nerve deficit, gait, coordination and speech normal  Psych: withdrawn, flat affect    Assessment/Plan:  1. Pneumonia of right upper lobe due to infectious organism ([facility] [address]  - resolved  - [place] DISCHARGE MEDS RECONCILED W/ CURRENT OUTPATIENT MED LIST  - CBC;  Future  - Comprehensive Metabolic Panel; Future    2. Hospital discharge follow-up  - I have encouraged patient to follow up with a family member to further clarify his past medical hx and medications - patient had no response  - labs ordered - patient states \"your not getting anything from me\"    3. Hyperlipidemia, unspecified hyperlipidemia type  - Lipid, Fasting; Future    4. Hyperglycemia  - CBC; Future  - Comprehensive Metabolic Panel; Future    5. Encounter for colorectal cancer screening  - Cologuard      Return in about 1 month (around 4/11/2020) for pneumonia and chronic conditions.       Medical Decision Making: moderate complexity

## 2020-04-15 ENCOUNTER — TELEPHONE (OUTPATIENT)
Dept: FAMILY MEDICINE CLINIC | Age: 70
End: 2020-04-15

## 2020-04-24 ENCOUNTER — APPOINTMENT (OUTPATIENT)
Dept: GENERAL RADIOLOGY | Age: 70
End: 2020-04-24
Payer: MEDICARE

## 2020-04-24 ENCOUNTER — APPOINTMENT (OUTPATIENT)
Dept: CT IMAGING | Age: 70
End: 2020-04-24
Payer: MEDICARE

## 2020-04-24 ENCOUNTER — HOSPITAL ENCOUNTER (EMERGENCY)
Age: 70
Discharge: HOME | End: 2020-04-24
Attending: EMERGENCY MEDICINE
Payer: MEDICARE

## 2020-04-24 VITALS
OXYGEN SATURATION: 94 % | WEIGHT: 187.9 LBS | BODY MASS INDEX: 26.9 KG/M2 | TEMPERATURE: 99.1 F | RESPIRATION RATE: 18 BRPM | HEART RATE: 49 BPM | DIASTOLIC BLOOD PRESSURE: 52 MMHG | SYSTOLIC BLOOD PRESSURE: 113 MMHG | HEIGHT: 70 IN

## 2020-04-24 LAB
ABSOLUTE EOS #: 0.27 K/UL (ref 0–0.4)
ABSOLUTE IMMATURE GRANULOCYTE: 0.27 K/UL (ref 0–0.3)
ABSOLUTE LYMPH #: 20.65 K/UL (ref 1–4.8)
ABSOLUTE MONO #: 0.27 K/UL (ref 0.2–0.8)
ANION GAP SERPL CALCULATED.3IONS-SCNC: 15 MMOL/L (ref 9–17)
ATYPICAL LYMPHOCYTE ABSOLUTE COUNT: 1.06 K/UL
ATYPICAL LYMPHOCYTES: 4 %
BASOPHILS # BLD: 0 %
BASOPHILS ABSOLUTE: 0 K/UL (ref 0–0.2)
BNP INTERPRETATION: ABNORMAL
BUN BLDV-MCNC: 13 MG/DL (ref 8–23)
BUN/CREAT BLD: 16 (ref 9–20)
CALCIUM SERPL-MCNC: 9.5 MG/DL (ref 8.6–10.4)
CHLORIDE BLD-SCNC: 101 MMOL/L (ref 98–107)
CO2: 23 MMOL/L (ref 20–31)
CREAT SERPL-MCNC: 0.8 MG/DL (ref 0.7–1.2)
DIFFERENTIAL TYPE: ABNORMAL
EOSINOPHILS RELATIVE PERCENT: 1 % (ref 1–4)
GFR AFRICAN AMERICAN: >60 ML/MIN
GFR NON-AFRICAN AMERICAN: >60 ML/MIN
GFR SERPL CREATININE-BSD FRML MDRD: ABNORMAL ML/MIN/{1.73_M2}
GFR SERPL CREATININE-BSD FRML MDRD: ABNORMAL ML/MIN/{1.73_M2}
GLUCOSE BLD-MCNC: 119 MG/DL (ref 70–99)
HCT VFR BLD CALC: 41.1 % (ref 40.7–50.3)
HEMOGLOBIN: 13.2 G/DL (ref 13–17)
IMMATURE GRANULOCYTES: 1 %
LYMPHOCYTES # BLD: 78 % (ref 24–44)
MCH RBC QN AUTO: 30.9 PG (ref 25.2–33.5)
MCHC RBC AUTO-ENTMCNC: 32.1 G/DL (ref 28.4–34.8)
MCV RBC AUTO: 96.3 FL (ref 82.6–102.9)
MONOCYTES # BLD: 1 % (ref 1–7)
MYOGLOBIN: 23 NG/ML (ref 28–72)
MYOGLOBIN: <21 NG/ML (ref 28–72)
NRBC AUTOMATED: 0 PER 100 WBC
PDW BLD-RTO: 12.7 % (ref 11.8–14.4)
PLATELET # BLD: 130 K/UL (ref 138–453)
PLATELET ESTIMATE: ABNORMAL
PMV BLD AUTO: 11 FL (ref 8.1–13.5)
POTASSIUM SERPL-SCNC: 4.3 MMOL/L (ref 3.7–5.3)
PRO-BNP: 344 PG/ML
RBC # BLD: 4.27 M/UL (ref 4.21–5.77)
RBC # BLD: ABNORMAL 10*6/UL
SEG NEUTROPHILS: 15 % (ref 36–66)
SEGMENTED NEUTROPHILS ABSOLUTE COUNT: 3.98 K/UL (ref 1.8–7.7)
SODIUM BLD-SCNC: 139 MMOL/L (ref 135–144)
TROPONIN INTERP: ABNORMAL
TROPONIN INTERP: ABNORMAL
TROPONIN T: ABNORMAL NG/ML
TROPONIN T: ABNORMAL NG/ML
TROPONIN, HIGH SENSITIVITY: 12 NG/L (ref 0–22)
TROPONIN, HIGH SENSITIVITY: 13 NG/L (ref 0–22)
WBC # BLD: 26.5 K/UL (ref 3.5–11.3)
WBC # BLD: ABNORMAL 10*3/UL

## 2020-04-24 PROCEDURE — 6360000004 HC RX CONTRAST MEDICATION: Performed by: NURSE PRACTITIONER

## 2020-04-24 PROCEDURE — 71275 CT ANGIOGRAPHY CHEST: CPT

## 2020-04-24 PROCEDURE — 93931 UPPER EXTREMITY STUDY: CPT

## 2020-04-24 PROCEDURE — 83880 ASSAY OF NATRIURETIC PEPTIDE: CPT

## 2020-04-24 PROCEDURE — 93005 ELECTROCARDIOGRAM TRACING: CPT | Performed by: NURSE PRACTITIONER

## 2020-04-24 PROCEDURE — 80048 BASIC METABOLIC PNL TOTAL CA: CPT

## 2020-04-24 PROCEDURE — 71045 X-RAY EXAM CHEST 1 VIEW: CPT

## 2020-04-24 PROCEDURE — 96360 HYDRATION IV INFUSION INIT: CPT

## 2020-04-24 PROCEDURE — 84484 ASSAY OF TROPONIN QUANT: CPT

## 2020-04-24 PROCEDURE — 99285 EMERGENCY DEPT VISIT HI MDM: CPT

## 2020-04-24 PROCEDURE — 2580000003 HC RX 258: Performed by: NURSE PRACTITIONER

## 2020-04-24 PROCEDURE — 83874 ASSAY OF MYOGLOBIN: CPT

## 2020-04-24 PROCEDURE — 85025 COMPLETE CBC W/AUTO DIFF WBC: CPT

## 2020-04-24 RX ORDER — 0.9 % SODIUM CHLORIDE 0.9 %
80 INTRAVENOUS SOLUTION INTRAVENOUS ONCE
Status: COMPLETED | OUTPATIENT
Start: 2020-04-24 | End: 2020-04-24

## 2020-04-24 RX ORDER — SODIUM CHLORIDE 0.9 % (FLUSH) 0.9 %
10 SYRINGE (ML) INJECTION PRN
Status: DISCONTINUED | OUTPATIENT
Start: 2020-04-24 | End: 2020-04-24 | Stop reason: HOSPADM

## 2020-04-24 RX ADMIN — SODIUM CHLORIDE 80 ML: 0.9 INJECTION, SOLUTION INTRAVENOUS at 16:47

## 2020-04-24 RX ADMIN — IOPAMIDOL 100 ML: 755 INJECTION, SOLUTION INTRAVENOUS at 16:47

## 2020-04-24 RX ADMIN — SODIUM CHLORIDE, PRESERVATIVE FREE 10 ML: 5 INJECTION INTRAVENOUS at 16:47

## 2020-04-24 ASSESSMENT — ENCOUNTER SYMPTOMS
PHOTOPHOBIA: 0
SHORTNESS OF BREATH: 0
COUGH: 0
SINUS PRESSURE: 0
ABDOMINAL PAIN: 0
VOMITING: 0
NAUSEA: 0

## 2020-04-24 NOTE — ED NOTES
Pt resting on stretcher, RR even & unlabored. NAD noted. Pt remains SB on monitor.       Krunal Valadez RN  04/24/20 2745

## 2020-04-24 NOTE — ED NOTES
Pt to ED with c/o LUE arm tingling, and discoloration. Pt reports that he was at home when his arm started to tingle, and fingers turned blue. Pt states it has been intermittent PTA. Pt arrives A&Ox4, ambulatory, skin warm & dry, eupneic on RA, SB on monitor, VS stable. BUE - pulses palpable +2. Pt denies CP, SOB, NVD. Pt reports he was recently admitted at the beginning of March with Pneumonia, unable to report if he was suspected COVID-19, pt states he was sent home on o2 but has not been using it d/t not needing it. EKG obtained in triage, telemetry applied.            Jose Holly RN  04/24/20 4857

## 2020-04-24 NOTE — ED TRIAGE NOTES
EKG performed not long after arrival.  Pt states he did not call EMS even though he has a past cardiac history, he called his daughter.

## 2020-04-24 NOTE — ED NOTES
Dr. Sierra Avina conferences with Dr. Jennifer Navarro - Vascular.       Naveen Coleman RN  04/24/20 9810

## 2020-04-25 ENCOUNTER — CARE COORDINATION (OUTPATIENT)
Dept: CARE COORDINATION | Age: 70
End: 2020-04-25

## 2020-04-25 LAB
EKG ATRIAL RATE: 57 BPM
EKG P AXIS: 60 DEGREES
EKG P-R INTERVAL: 162 MS
EKG Q-T INTERVAL: 440 MS
EKG QRS DURATION: 102 MS
EKG QTC CALCULATION (BAZETT): 428 MS
EKG R AXIS: 52 DEGREES
EKG T AXIS: -131 DEGREES
EKG VENTRICULAR RATE: 57 BPM

## 2020-04-28 ENCOUNTER — CARE COORDINATION (OUTPATIENT)
Dept: CARE COORDINATION | Age: 70
End: 2020-04-28

## 2020-04-28 NOTE — CARE COORDINATION
2nd attempted call for ED follow up/ COVID monitoring. VM message left, contact information provided.    No further outreach planned

## 2020-04-29 ENCOUNTER — TELEPHONE (OUTPATIENT)
Dept: ADMINISTRATIVE | Age: 70
End: 2020-04-29

## 2020-04-29 NOTE — TELEPHONE ENCOUNTER
I can not give them an order to do anything with the oxygen as the patient refused to cooperate and it was ordered by the hospital. If I give them an order to  the oxygen and he requires it, that is negligence on my part. If the patient is not using the oxygen, they can have the patient sign for against medical advice and take the oxygen.

## 2020-04-29 NOTE — TELEPHONE ENCOUNTER
Patient refused 6 minute walk and refused to wear oxygen. He was sent home with this from the hospital with little documentation that he even required it.  I can not sign that he requires this as he refused the 6 minute walk and has had no f/u

## 2020-04-29 NOTE — TELEPHONE ENCOUNTER
Marina Guzman from [employer] called asking for cert of medical neccessity for pts oxygen, It was faxed 4/15/20.  Please advise

## 2020-05-05 ENCOUNTER — TELEPHONE (OUTPATIENT)
Dept: ADMINISTRATIVE | Age: 70
End: 2020-05-05

## 2020-06-08 ENCOUNTER — OFFICE VISIT (OUTPATIENT)
Dept: FAMILY MEDICINE CLINIC | Age: 70
End: 2020-06-08
Payer: MEDICARE

## 2020-06-08 VITALS
TEMPERATURE: 97.5 F | HEIGHT: 70 IN | DIASTOLIC BLOOD PRESSURE: 72 MMHG | BODY MASS INDEX: 27.52 KG/M2 | OXYGEN SATURATION: 98 % | WEIGHT: 192.2 LBS | HEART RATE: 59 BPM | SYSTOLIC BLOOD PRESSURE: 130 MMHG

## 2020-06-08 PROBLEM — E78.5 HYPERLIPIDEMIA: Status: ACTIVE | Noted: 2020-06-08

## 2020-06-08 PROCEDURE — 1036F TOBACCO NON-USER: CPT | Performed by: NURSE PRACTITIONER

## 2020-06-08 PROCEDURE — G0009 ADMIN PNEUMOCOCCAL VACCINE: HCPCS | Performed by: NURSE PRACTITIONER

## 2020-06-08 PROCEDURE — 4040F PNEUMOC VAC/ADMIN/RCVD: CPT | Performed by: NURSE PRACTITIONER

## 2020-06-08 PROCEDURE — 99214 OFFICE O/P EST MOD 30 MIN: CPT | Performed by: NURSE PRACTITIONER

## 2020-06-08 PROCEDURE — G8427 DOCREV CUR MEDS BY ELIG CLIN: HCPCS | Performed by: NURSE PRACTITIONER

## 2020-06-08 PROCEDURE — 3017F COLORECTAL CA SCREEN DOC REV: CPT | Performed by: NURSE PRACTITIONER

## 2020-06-08 PROCEDURE — 90471 IMMUNIZATION ADMIN: CPT | Performed by: NURSE PRACTITIONER

## 2020-06-08 PROCEDURE — 90732 PPSV23 VACC 2 YRS+ SUBQ/IM: CPT | Performed by: NURSE PRACTITIONER

## 2020-06-08 PROCEDURE — 1123F ACP DISCUSS/DSCN MKR DOCD: CPT | Performed by: NURSE PRACTITIONER

## 2020-06-08 PROCEDURE — G8417 CALC BMI ABV UP PARAM F/U: HCPCS | Performed by: NURSE PRACTITIONER

## 2020-06-08 NOTE — PROGRESS NOTES
medication adherence  2. Patient given educational materials - see patient instructions  3. Was a self-tracking handout given in paper form or via [employer]? No  If yes, see orders or list here. 4.  Discussed use, benefit, and side effects of prescribed medications. Barriers to medication compliance addressed. All patient questions answered. Pt voiced understanding. 5.  Reviewed prior labs, imaging, consultation, follow up, and health maintenance  6. Continue current medications, diet and exercise. 7. Discussed use, benefit, and side effects of prescribed medications. Barriers to medication compliance addressed. All her questions were answered. Pt voiced understanding. Wilmer will continue current medications, diet and exercise. Patient given educational materials on DASh diet    Of the 25 minute duration appointment visit, Romario Yen CNP spent at least 50% of the face-to-face time in counseling, explanation of diagnosis, planning of further management, and answering all questions. Signed:  Romario Yen CNP    This note is created with the assistance of a speech-recognition program.  While intending to generate a document that actually reflects the content of the visit, no guarantees can be provided that every mistake has been identified and corrected by editing.

## 2020-06-08 NOTE — LETTER
[facility]  [address] [facility] [address]  Phone: [phone]  Fax: [phone]         June 8, 2020     Patient: Nghia [place]   YOB: 1950   Date of Visit: 6/8/2020       To Whom It May Concern:    Nghia [place] was seen and treated in my office on 6/8/2020. There is no indication that he requires or has required the use of home oxygen and should not continue to have this at his home. Please  oxygen equipment as requested by the patient.       Sincerely,          Issac Bamberger, APRN - CNP

## 2020-06-09 ENCOUNTER — HOSPITAL ENCOUNTER (OUTPATIENT)
Age: 70
Setting detail: SPECIMEN
Discharge: HOME | End: 2020-06-09
Payer: MEDICARE

## 2020-06-09 LAB
ALBUMIN SERPL-MCNC: 4.4 G/DL (ref 3.5–5.2)
ALBUMIN/GLOBULIN RATIO: 2.1 (ref 1–2.5)
ALP BLD-CCNC: 64 U/L (ref 40–129)
ALT SERPL-CCNC: 19 U/L (ref 5–41)
ANION GAP SERPL CALCULATED.3IONS-SCNC: 15 MMOL/L (ref 9–17)
AST SERPL-CCNC: 21 U/L
BILIRUB SERPL-MCNC: 0.61 MG/DL (ref 0.3–1.2)
BUN BLDV-MCNC: 10 MG/DL (ref 8–23)
BUN/CREAT BLD: NORMAL (ref 9–20)
CALCIUM SERPL-MCNC: 9.4 MG/DL (ref 8.6–10.4)
CHLORIDE BLD-SCNC: 104 MMOL/L (ref 98–107)
CHOLESTEROL, FASTING: 179 MG/DL
CHOLESTEROL/HDL RATIO: 3.7
CO2: 25 MMOL/L (ref 20–31)
CREAT SERPL-MCNC: 0.83 MG/DL (ref 0.7–1.2)
GFR AFRICAN AMERICAN: >60 ML/MIN
GFR NON-AFRICAN AMERICAN: >60 ML/MIN
GFR SERPL CREATININE-BSD FRML MDRD: NORMAL ML/MIN/{1.73_M2}
GFR SERPL CREATININE-BSD FRML MDRD: NORMAL ML/MIN/{1.73_M2}
GLUCOSE BLD-MCNC: 93 MG/DL (ref 70–99)
HCT VFR BLD CALC: 44.8 % (ref 40.7–50.3)
HDLC SERPL-MCNC: 49 MG/DL
HEMOGLOBIN: 13.9 G/DL (ref 13–17)
HEPATITIS C ANTIBODY: NONREACTIVE
LDL CHOLESTEROL: 70 MG/DL (ref 0–130)
MCH RBC QN AUTO: 30.9 PG (ref 25.2–33.5)
MCHC RBC AUTO-ENTMCNC: 31 G/DL (ref 28.4–34.8)
MCV RBC AUTO: 99.6 FL (ref 82.6–102.9)
NRBC AUTOMATED: 0 PER 100 WBC
PDW BLD-RTO: 13.2 % (ref 11.8–14.4)
PLATELET # BLD: 114 K/UL (ref 138–453)
PMV BLD AUTO: 11.8 FL (ref 8.1–13.5)
POTASSIUM SERPL-SCNC: 4.8 MMOL/L (ref 3.7–5.3)
RBC # BLD: 4.5 M/UL (ref 4.21–5.77)
SODIUM BLD-SCNC: 144 MMOL/L (ref 135–144)
TOTAL PROTEIN: 6.5 G/DL (ref 6.4–8.3)
TRIGLYCERIDE, FASTING: 300 MG/DL
VLDLC SERPL CALC-MCNC: ABNORMAL MG/DL (ref 1–30)
WBC # BLD: 22 K/UL (ref 3.5–11.3)

## 2020-06-20 ENCOUNTER — HOSPITAL ENCOUNTER (OUTPATIENT)
Dept: VASCULAR LAB | Age: 70
Discharge: HOME | End: 2020-06-20
Payer: MEDICARE

## 2020-06-20 PROCEDURE — 93880 EXTRACRANIAL BILAT STUDY: CPT

## 2020-07-14 ENCOUNTER — VIRTUAL VISIT (OUTPATIENT)
Dept: FAMILY MEDICINE CLINIC | Age: 70
End: 2020-07-14
Payer: MEDICARE

## 2020-07-14 VITALS — RESPIRATION RATE: 18 BRPM | WEIGHT: 192 LBS | HEIGHT: 70 IN | BODY MASS INDEX: 27.49 KG/M2

## 2020-07-14 PROCEDURE — 1123F ACP DISCUSS/DSCN MKR DOCD: CPT | Performed by: NURSE PRACTITIONER

## 2020-07-14 PROCEDURE — G0438 PPPS, INITIAL VISIT: HCPCS | Performed by: NURSE PRACTITIONER

## 2020-07-14 PROCEDURE — 3017F COLORECTAL CA SCREEN DOC REV: CPT | Performed by: NURSE PRACTITIONER

## 2020-07-14 PROCEDURE — 4040F PNEUMOC VAC/ADMIN/RCVD: CPT | Performed by: NURSE PRACTITIONER

## 2020-07-14 ASSESSMENT — LIFESTYLE VARIABLES
HAS A RELATIVE, FRIEND, DOCTOR, OR ANOTHER HEALTH PROFESSIONAL EXPRESSED CONCERN ABOUT YOUR DRINKING OR SUGGESTED YOU CUT DOWN: 0
HOW MANY STANDARD DRINKS CONTAINING ALCOHOL DO YOU HAVE ON A TYPICAL DAY: 0
HOW OFTEN DURING THE LAST YEAR HAVE YOU FOUND THAT YOU WERE NOT ABLE TO STOP DRINKING ONCE YOU HAD STARTED: 0
HAVE YOU OR SOMEONE ELSE BEEN INJURED AS A RESULT OF YOUR DRINKING: 0
HOW OFTEN DURING THE LAST YEAR HAVE YOU BEEN UNABLE TO REMEMBER WHAT HAPPENED THE NIGHT BEFORE BECAUSE YOU HAD BEEN DRINKING: 0
HOW OFTEN DO YOU HAVE A DRINK CONTAINING ALCOHOL: 4
AUDIT-C TOTAL SCORE: 4
HOW OFTEN DURING THE LAST YEAR HAVE YOU HAD A FEELING OF GUILT OR REMORSE AFTER DRINKING: 0
HOW OFTEN DURING THE LAST YEAR HAVE YOU FAILED TO DO WHAT WAS NORMALLY EXPECTED FROM YOU BECAUSE OF DRINKING: 0
HOW OFTEN DURING THE LAST YEAR HAVE YOU NEEDED AN ALCOHOLIC DRINK FIRST THING IN THE MORNING TO GET YOURSELF GOING AFTER A NIGHT OF HEAVY DRINKING: 0
AUDIT TOTAL SCORE: 4
HOW OFTEN DO YOU HAVE SIX OR MORE DRINKS ON ONE OCCASION: 0

## 2020-07-14 ASSESSMENT — PATIENT HEALTH QUESTIONNAIRE - PHQ9
SUM OF ALL RESPONSES TO PHQ QUESTIONS 1-9: 0
SUM OF ALL RESPONSES TO PHQ QUESTIONS 1-9: 0

## 2020-07-14 NOTE — PROGRESS NOTES
Medicare Annual Wellness Visit  Are Name: Anton King Date: 2020   MRN: [id number] Sex: Male   Age: 72 y.o. Ethnicity: Non-/Non    : 1950 Race: Patricia Aquino is here for Medicare AWV    Screenings for behavioral, psychosocial and functional/safety risks, and cognitive dysfunction are all negative except as indicated below. These results, as well as other patient data from the [address] form, are documented in Flowsheets linked to this Encounter. No Known Allergies    Prior to Visit Medications    Medication Sig Taking? Authorizing Provider   aspirin 325 MG EC tablet Take 1 tablet by mouth daily. Yes Vermohandine MD Glenna   lisinopril (PRINIVIL;ZESTRIL) 5 MG tablet Take 5 mg by mouth daily. Yes Historical Provider, MD   simvastatin (ZOCOR) 40 MG tablet Take 40 mg by mouth nightly  Yes Historical Provider, MD   Omeprazole 20 MG TBEC Take 20 tablets by mouth daily. Yes Historical Provider, MD   metoprolol (TOPROL-XL) 25 MG XL tablet Take 25 mg by mouth daily.  Yes Historical Provider, MD       Past Medical History:   Diagnosis Date    CAD (coronary artery disease)     heart attack x 2 with stent    History of heart artery stent     x 1     Hyperlipidemia     Lymphoma ([facility] [address]     Right side of neck, no chemo or radiation       Past Surgical History:   Procedure Laterality Date    ARTHROTOMY      right foot big toe then removed    CAROTID ENDARTERECTOMY Left 2014    FINGER AMPUTATION Left     ring finger    HYDROCELE EXCISION Left     LUMBAR DISC SURGERY      L5-S1    LYMPH NODE DISSECTION      right side of neck    RECTAL SURGERY      fissure       Family History   Problem Relation Age of Onset    Stroke Father         two strokes  at 69    Other Mother          at 80 from unknown causes    Other Brother         alcoholism       CareTeam (Including outside providers/suppliers regularly involved in providing care):   Patient Care colonoscopy  03/03/2001    Annual Wellness Visit (AWV)  03/03/2020    Shingles Vaccine (1 of 2) 03/11/2021 (Originally 3/3/2001)    Flu vaccine (1) 09/01/2020    A1C test (Diabetic or Prediabetic)  03/04/2021    Lipid screen  06/09/2021    Potassium monitoring  06/09/2021    Creatinine monitoring  06/09/2021    DTaP/Tdap/Td vaccine (2 - Td) 06/08/2030    Pneumococcal 65+ years Vaccine  Completed    AAA screen  Completed    Hepatitis C screen  Completed    Hepatitis A vaccine  Aged Out    Hepatitis B vaccine  Aged Out    Hib vaccine  Aged Out    Meningococcal (ACWY) vaccine  Aged Out     Recommendations for BTIG Due: see orders and patient instructions/AVS.  . Recommended screening schedule for the next 5-10 years is provided to the patient in written form: see Patient Pat Webster was seen today for medicare awv. Diagnoses and all orders for this visit:    Routine general medical examination at a health care facility              Dionna Mason is a 72 y.o. male being evaluated by a Virtual Visit (phone) encounter to address concerns as mentioned above. A caregiver was present when appropriate. Due to this being a TeleHealth encounter (During [facility] emergency), evaluation of the following organ systems was limited: Vitals/Constitutional/EENT/Resp/CV/GI//MS/Neuro/Skin/Heme-Lymph-Imm. Pursuant to the emergency declaration under the [address] [place] [address], [address] [place] [address] authority and the [employer] Act, this Virtual Visit was conducted with patient's (and/or legal guardian's) consent, to reduce the patient's risk of exposure to COVID-19 and provide necessary medical care. The patient (and/or legal guardian) has also been advised to contact this office for worsening conditions or problems, and seek emergency medical treatment and/or call 911 if deemed necessary.      Patient identification was verified at the start of the visit: Yes    Services were provided through phone to substitute for in-person clinic visit. Patient and provider were located at their individual homes. --LIZETH Lin CNP on 7/14/2020 at 2:25 PM    An electronic signature was used to authenticate this note.

## 2020-07-14 NOTE — PATIENT INSTRUCTIONS
Personalized Preventive Plan for My Mark - 7/14/2020  Medicare offers a range of preventive health benefits. Some of the tests and screenings are paid in full while other may be subject to a deductible, co-insurance, and/or copay. Some of these benefits include a comprehensive review of your medical history including lifestyle, illnesses that may run in your family, and various assessments and screenings as appropriate. After reviewing your medical record and screening and assessments performed today your provider may have ordered immunizations, labs, imaging, and/or referrals for you. A list of these orders (if applicable) as well as your Preventive Care list are included within your After Visit Summary for your review. Other Preventive Recommendations:    · A preventive eye exam performed by an eye specialist is recommended every 1-2 years to screen for glaucoma; cataracts, macular degeneration, and other eye disorders. · A preventive dental visit is recommended every 6 months. · Try to get at least 150 minutes of exercise per week or 10,000 steps per day on a pedometer . · Order or download the FREE \"Exercise & Physical Activity: Your Everyday Guide\" from [employer]. Call 2-248.622.6913 or search [employer] online. · You need 5308-1079 mg of calcium and 4686-2441 IU of vitamin D per day. It is possible to meet your calcium requirement with diet alone, but a vitamin D supplement is usually necessary to meet this goal.  · When exposed to the sun, use a sunscreen that protects against both UVA and UVB radiation with an SPF of 30 or greater. Reapply every 2 to 3 hours or after sweating, drying off with a towel, or swimming. · Always wear a seat belt when traveling in a car. Always wear a helmet when riding a bicycle or motorcycle.

## 2020-09-21 ENCOUNTER — IMMUNIZATION (OUTPATIENT)
Dept: FAMILY MEDICINE CLINIC | Age: 70
End: 2020-09-21

## 2020-09-21 PROCEDURE — 90694 VACC AIIV4 NO PRSRV 0.5ML IM: CPT | Performed by: NURSE PRACTITIONER

## 2020-09-21 PROCEDURE — G0008 ADMIN INFLUENZA VIRUS VAC: HCPCS | Performed by: NURSE PRACTITIONER

## 2020-09-28 ENCOUNTER — IMMUNIZATION (OUTPATIENT)
Dept: FAMILY MEDICINE CLINIC | Age: 70
End: 2020-09-28
Payer: MEDICARE

## 2020-10-12 ENCOUNTER — HOSPITAL ENCOUNTER (OUTPATIENT)
Dept: GENERAL RADIOLOGY | Age: 70
Discharge: HOME | End: 2020-10-14
Payer: MEDICARE

## 2020-10-12 ENCOUNTER — HOSPITAL ENCOUNTER (OUTPATIENT)
Dept: PREADMISSION TESTING | Age: 70
Discharge: HOME | End: 2020-10-16
Payer: MEDICARE

## 2020-10-12 VITALS
SYSTOLIC BLOOD PRESSURE: 170 MMHG | WEIGHT: 199 LBS | TEMPERATURE: 97.5 F | HEART RATE: 55 BPM | OXYGEN SATURATION: 98 % | RESPIRATION RATE: 16 BRPM | DIASTOLIC BLOOD PRESSURE: 61 MMHG | BODY MASS INDEX: 27.86 KG/M2 | HEIGHT: 71 IN

## 2020-10-12 LAB
ABO/RH: NORMAL
ABSOLUTE EOS #: 0.18 K/UL (ref 0–0.4)
ABSOLUTE IMMATURE GRANULOCYTE: 0 K/UL (ref 0–0.3)
ABSOLUTE LYMPH #: 14.48 K/UL (ref 1–4.8)
ABSOLUTE MONO #: 0.54 K/UL (ref 0.2–0.8)
ANION GAP SERPL CALCULATED.3IONS-SCNC: 8 MMOL/L (ref 9–17)
ANTIBODY SCREEN: NEGATIVE
ARM BAND NUMBER: NORMAL
BASOPHILS # BLD: 0 %
BASOPHILS ABSOLUTE: 0 K/UL (ref 0–0.2)
BILIRUBIN URINE: NEGATIVE
BUN BLDV-MCNC: 11 MG/DL (ref 8–23)
CHLORIDE BLD-SCNC: 106 MMOL/L (ref 98–107)
CO2: 26 MMOL/L (ref 20–31)
COLOR: YELLOW
COMMENT UA: ABNORMAL
CREAT SERPL-MCNC: 0.93 MG/DL (ref 0.7–1.2)
DIFFERENTIAL TYPE: ABNORMAL
EOSINOPHILS RELATIVE PERCENT: 1 % (ref 1–4)
EXPIRATION DATE: NORMAL
GFR AFRICAN AMERICAN: >60 ML/MIN
GFR NON-AFRICAN AMERICAN: >60 ML/MIN
GFR SERPL CREATININE-BSD FRML MDRD: NORMAL ML/MIN/{1.73_M2}
GFR SERPL CREATININE-BSD FRML MDRD: NORMAL ML/MIN/{1.73_M2}
GLUCOSE URINE: NEGATIVE
HCT VFR BLD CALC: 43.7 % (ref 40.7–50.3)
HEMOGLOBIN: 13.9 G/DL (ref 13–17)
IMMATURE GRANULOCYTES: 0 %
INR BLD: 1
KETONES, URINE: NEGATIVE
LEUKOCYTE ESTERASE, URINE: NEGATIVE
LYMPHOCYTES # BLD: 80 % (ref 24–44)
MCH RBC QN AUTO: 31.2 PG (ref 25.2–33.5)
MCHC RBC AUTO-ENTMCNC: 31.8 G/DL (ref 28–38)
MCV RBC AUTO: 98.2 FL (ref 82.6–102.9)
MONOCYTES # BLD: 3 % (ref 1–7)
NITRITE, URINE: NEGATIVE
NRBC AUTOMATED: ABNORMAL PER 100 WBC
PARTIAL THROMBOPLASTIN TIME: 25.8 SEC (ref 23.9–33.8)
PDW BLD-RTO: 12.1 % (ref 11.8–14.4)
PH UA: 5.5 (ref 5–8)
PLATELET # BLD: 108 K/UL (ref 138–453)
PLATELET ESTIMATE: ABNORMAL
PMV BLD AUTO: 10.9 FL (ref 8.1–13.5)
POTASSIUM SERPL-SCNC: 4.5 MMOL/L (ref 3.7–5.3)
PROTEIN UA: NEGATIVE
PROTHROMBIN TIME: 12.5 SEC (ref 11.5–14.2)
RBC # BLD: 4.45 M/UL (ref 4.21–5.77)
RBC # BLD: ABNORMAL 10*6/UL
SEG NEUTROPHILS: 16 % (ref 36–66)
SEGMENTED NEUTROPHILS ABSOLUTE COUNT: 2.9 K/UL (ref 1.8–7.7)
SODIUM BLD-SCNC: 140 MMOL/L (ref 135–144)
SPECIFIC GRAVITY UA: 1.03 (ref 1–1.03)
TURBIDITY: CLEAR
URINE HGB: NEGATIVE
UROBILINOGEN, URINE: NORMAL
WBC # BLD: 18.1 K/UL (ref 3.5–11.3)
WBC # BLD: ABNORMAL 10*3/UL

## 2020-10-12 PROCEDURE — 71046 X-RAY EXAM CHEST 2 VIEWS: CPT

## 2020-10-12 PROCEDURE — 85025 COMPLETE CBC W/AUTO DIFF WBC: CPT

## 2020-10-12 PROCEDURE — 85610 PROTHROMBIN TIME: CPT

## 2020-10-12 PROCEDURE — 80051 ELECTROLYTE PANEL: CPT

## 2020-10-12 PROCEDURE — 85730 THROMBOPLASTIN TIME PARTIAL: CPT

## 2020-10-12 PROCEDURE — 84520 ASSAY OF UREA NITROGEN: CPT

## 2020-10-12 PROCEDURE — 82565 ASSAY OF CREATININE: CPT

## 2020-10-12 PROCEDURE — 86900 BLOOD TYPING SEROLOGIC ABO: CPT

## 2020-10-12 PROCEDURE — 86901 BLOOD TYPING SEROLOGIC RH(D): CPT

## 2020-10-12 PROCEDURE — 81003 URINALYSIS AUTO W/O SCOPE: CPT

## 2020-10-12 PROCEDURE — 86850 RBC ANTIBODY SCREEN: CPT

## 2020-10-12 NOTE — H&P
History and Physical Service   [facility]    HISTORY AND PHYSICAL EXAMINATION            Date of Evaluation: 10/12/2020  Patient name:  Jensen Coles  MRN:   [id number]  YOB: 1950  PCP:    LIEZTH Pruitt CNP    History Obtained From:     Patient, medical records    History of Present Illness: This is Jensen Coles a 72 y.o. male who presents today for a PRE-TESTING APPOINTMENT PRIOR TO A LEFT CAROTID ENDARTERECTOMY RE-DO  by Dr. Sarah Claros  for TREATMENT OF CAROTID STENOSIS. THE PATIENT  HE HAS HAD VISION CHANGES., LEFT ARM NUMBNESS AND TINGLING IN HIS LEFT FINGERS. HIS LEFT HAND WOULD \"TURN COLD \"  AND FINGERS WOULD TURN BLUE. HE ALSO DENIES ANY HEADACHES OR DIZZINESS. NO INCREASE IN FATIGUE. HE PRESENTS NOW FOR SURGICAL CORRECTION. Functional Capacity:   1) Pt is  able to walk 2 city blocks or more on level ground without SOB. 2) Pt is  able to climb 2 flights of stairs without SOB. 3) Pt is  able to walk up a hill for 1-2 city blocks without SOB. HAS BACK AND LEG PAIN WHEN EXERTING HIMSELF BUT DENIES CHEST PAIN. Past Medical History:     Past Medical History:   Diagnosis Date    Arthritis     Spine, left wrist    CAD (coronary artery disease)     heart attack x 2 with stent    History of heart artery stent     x 1     Hyperlipidemia     Lymphoma (HCC)     Right side of neck, no chemo or radiation    MI [facility] infarction) ([facility] [address]     approx. 1992 per pt.  Wears dentures         Past Surgical History:     Past Surgical History:   Procedure Laterality Date    ARTHROTOMY      right foot big toe then removed    CAROTID ENDARTERECTOMY Left 4/16/2014    COLONOSCOPY      Polypectomy per pt.     CORONARY ANGIOPLASTY WITH STENT PLACEMENT      stent x1    EYE SURGERY Bilateral     Foreign body removal    FINGER AMPUTATION Left     ring finger    HYDROCELE EXCISION Left     LUMBAR DISC SURGERY      L5-S1    LYMPH NODE DISSECTION      right side of neck    RECTAL SURGERY      fissure        Medications Prior to Admission:     Prior to Admission medications    Medication Sig Start Date End Date Taking? Authorizing Provider   aspirin 325 MG EC tablet Take 1 tablet by mouth daily. 14   Tejas Escobar MD   lisinopril (PRINIVIL;ZESTRIL) 5 MG tablet Take 5 mg by mouth daily. Historical Provider, MD   simvastatin (ZOCOR) 40 MG tablet Take 40 mg by mouth nightly     Historical Provider, MD   Omeprazole 20 MG TBEC Take 20 tablets by mouth daily. Historical Provider, MD   metoprolol (TOPROL-XL) 25 MG XL tablet Take 25 mg by mouth daily. Historical Provider, MD        Allergies:     Patient has no known allergies. Social History:     Tobacco:    reports that he quit smoking about 7 months ago. His smoking use included cigarettes. He has a 12.00 pack-year smoking history. He has never used smokeless tobacco.  Alcohol:      reports current alcohol use of about 21.0 standard drinks of alcohol per week. Drug Use:  reports no history of drug use. Family History:     Family History   Problem Relation Age of Onset    Stroke Father         two strokes  at 69    Other Mother          at 80 from unknown causes    Other Brother         alcoholism       Review of Systems:     Positive and Negative as described in HPI. CONSTITUTIONAL:  negative for fevers, chills, sweats, fatigue, weight loss  HEENT:  WEARS GLASSES  for vision,HAD PREVIOUS EYE SURGERY  hearing changes, runny nose, throat pain  RESPIRATORY:  HAS COPD AND OCCASIONAL  shortness of breath, cough,NO  congestion, wheezing. CARDIOVASCULAR:  negative for chest pain, palpitations. HAS A MURMUR ,HE HAS HAD ANGIOPLASTY AND HAS 1 STENT HE TAKES  MG. DAILY  GASTROINTESTINAL:  negative for nausea, vomiting, diarrhea, constipation, change in bowel habits, abdominal pain   GENITOURINARY:  negative for difficulty of urination, burning with urination, frequency   INTEGUMENT:  negative for rash, skin lesions, easy bruising   HEMATOLOGIC/LYMPHATIC:  negative for swelling/edema   ALLERGIC/IMMUNOLOGIC:  negative for urticaria , itching  ENDOCRINE:  negative increase in drinking, increase in urination, hot or cold intolerance  MUSCULOSKELETAL: HAS  joint pains, muscle aches, swelling of joints  NEUROLOGICAL:  negative for headaches, dizziness, lightheadedness, numbness, pain, tingling extremities  BEHAVIOR/PSYCH:  negative for depression, anxiety    Physical Exam:   BP (!) 170/61   Pulse 55   Temp 97.5 °F (36.4 °C) (Temporal)   Resp 16   Ht 5' 10.5\" (1.791 m)   Wt 199 lb (90.3 kg)   SpO2 98%   BMI 28.15 kg/m²     No results for input(s): POCGLU in the last 72 hours. General Appearance:  alert, well appearing, and in no acute distress  Mental status: oriented to person, place, and time with normal affect  Head:  normocephalic, atraumatic. Eye: no icterus, redness, pupils equal and reactive, extraocular eye movements intact, conjunctiva clear  Ear: normal external ear, no discharge, hearing intact  Nose:  no drainage noted  Mouth: mucous membranes moist  Neck: supple,HAS BILATERAL  carotid bruits LEFT IS + 4  RIGHT IS + 2 , thyroid not palpable  Lungs: Bilateral equal air entry, clear to ausculation, no wheezing, rales or rhonchi, normal effort  Cardiovascular: normal rate, regular rhythm, no murmur, gallop, rub.   Abdomen: Soft, nontender, nondistended, normal bowel sounds, X  4   Neurologic: There are no new focal motor or sensory deficits, normal muscle tone and bulk, no abnormal sensation, normal speech, cranial nerves II through XII grossly intact  Skin: No gross lesions, rashes, bruising or bleeding on exposed skin area  Extremities:  peripheral pulses  BARELY palpable IN LOWER EXTREMITIES BILATERALLY, no pedal edema or calf pain with palpation  Psych: normal affect     Investigations:      Laboratory Testing:  Recent Results (from the past 24 hour(s))   Urinalysis Reflex to Culture Collection Time: 10/12/20  9:45 AM    Specimen: Urine, clean catch   Result Value Ref Range    Color, UA YELLOW YELLOW    Turbidity UA CLEAR CLEAR    Glucose, Ur NEGATIVE NEGATIVE    Bilirubin Urine NEGATIVE NEGATIVE    Ketones, Urine NEGATIVE NEGATIVE    Specific Gravity, UA 1.032 (H) 1.005 - 1.030    Urine Hgb NEGATIVE NEGATIVE    pH, UA 5.5 5.0 - 8.0    Protein, UA NEGATIVE NEGATIVE    Urobilinogen, Urine Normal Normal    Nitrite, Urine NEGATIVE NEGATIVE    Leukocyte Esterase, Urine NEGATIVE NEGATIVE    Urinalysis Comments NOT REPORTED    CBC Auto Differential    Collection Time: 10/12/20 10:12 AM   Result Value Ref Range    WBC 18.1 (H) 3.5 - 11.3 k/uL    RBC 4.45 4.21 - 5.77 m/uL    Hemoglobin 13.9 13.0 - 17.0 g/dL    Hematocrit 43.7 40.7 - 50.3 %    MCV 98.2 82.6 - 102.9 fL    MCH 31.2 25.2 - 33.5 pg    MCHC 31.8 28.0 - 38.0 g/dL    RDW 12.1 11.8 - 14.4 %    Platelets 418 (L) 256 - 453 k/uL    MPV 10.9 8.1 - 13.5 fL    NRBC Automated NOT REPORTED 0.0 per 100 WBC    Differential Type NOT REPORTED     Seg Neutrophils PENDING %    Lymphocytes PENDING %    Monocytes PENDING %    Eosinophils % PENDING %    Basophils PENDING %    Immature Granulocytes PENDING 0 %    Segs Absolute PENDING k/uL    Absolute Lymph # PENDING k/uL    Absolute Mono # PENDING k/uL    Absolute Eos # PENDING k/uL    Basophils Absolute PENDING 0.0 - 0.2 k/uL    Absolute Immature Granulocyte PENDING 0.00 - 0.30 k/uL    WBC Morphology NOT REPORTED     RBC Morphology NOT REPORTED     Platelet Estimate NOT REPORTED    BUN & Creatinine    Collection Time: 10/12/20 10:12 AM   Result Value Ref Range    BUN 11 8 - 23 mg/dL    CREATININE 0.93 0.70 - 1.20 mg/dL    GFR Non-African American >60 >60 mL/min    GFR African American >60 >60 mL/min    GFR Comment          GFR Staging NOT REPORTED    Electrolyte Panel    Collection Time: 10/12/20 10:12 AM   Result Value Ref Range    Sodium 140 135 - 144 mmol/L    Potassium 4.5 3.7 - 5.3 mmol/L    Chloride 106 98 - 107 mmol/L    CO2 26 20 - 31 mmol/L    Anion Gap 8 (L) 9 - 17 mmol/L       Recent Labs     10/12/20  1012   HGB 13.9   HCT 43.7   WBC 18.1*   MCV 98.2      K 4.5      CO2 26   BUN 11   CREATININE 0.93       Imaging/Diagnostics:    CARDIAC CLEARANCE ON THE SHORT CHART    Diagnosis:      1. BILATERAL CAROTID STENOSIS     Plans:     1.  LEFT CAROTID ENDARTRECTOMY      LIZETH Zhao - HERB  10/12/2020  10:36 AM

## 2020-10-12 NOTE — PRE-PROCEDURE INSTRUCTIONS
Covid testing on 10/22/20 at 9:30am main entrance, please stay in your vehicle and a missy will come to you. ARRIVE AT [place] [address] ON Monday, 10/26/20 at 10:00 AM  On arrival, please call [phone]. Continue to take your home medications as you normally do up to and including the night before surgery with the exception of any blood thinning medications. Please stop any blood thinning medications as directed by your surgeon or prescribing physician. Failure to stop certain medications may interfere with your scheduled surgery. These may include:  Aspirin, Warfarin (Coumadin), Clopidogrel (Plavix), Ibuprofen (Motrin, Advil), Naproxen (Aleve), Meloxicam (Mobic), Celecoxib (Celebrex), Diclofenac, Eliquis, Pradaxa, Xarelto, Effient, Fish Oil, Herbal supplements. Tylenol/Acetaminophen is okay. Please take the following medication(s) the day of surgery with a small sip of water:  Metoprolol, Omeprazole. Please use your inhalers at home the day of surgery. PREPARING FOR YOUR SURGERY:     Before surgery, you can play an important role in your own health. Because skin is not sterile, we need to be sure that your skin is as free of germs as possible before surgery by carefully washing before surgery. Preparing or prepping skin before surgery can reduce the risk of a surgical site infection.   Do not shave the area of your body where your surgery will be performed unless you received specific permission from your physician. You will need to shower at home the night before surgery and the morning of surgery with a special soap called chlorhexidine gluconate (CHG*). *Not to be used by people allergic to Chlorhexidine Gluconate (CHG). Following these instructions will help you be sure that your skin is clean before surgery. Instructions on cleaning your skin before surgery: The night before your surgery:      You will need to shower with warm water (not hot) and the CHG soap.  Use a clean wash cloth and a clean towel. Have clean clothes available to put on after the shower.    First wash your hair with regular shampoo. Rinse your hair and body thoroughly to remove the shampoo. Nicole Osei Wash your face with your regular soap or water only. Thoroughly rinse your body with warm water from the neck down.  Turn water off to prevent rinsing the soap off too soon.  With a clean wet washcloth and half of the CHG soap in the bottle, lather your entire body from the neck down. Do not use CHG soap near your eyes or ears to avoid injury to those areas.  Wash thoroughly, paying special attention to the area where your surgery will be performed.  Wash your body gently for five (5) minutes. Avoid scrubbing your skin too hard.  Turn the water back on and rinse your body thoroughly.  Pat yourself dry with a clean, soft towel. Do not apply lotion, cream or powder.  Dress with clean freshly washed clothes. The morning of surgery:     Repeat shower following steps above - using remaining half of CHG soap in bottle. Patient Instructions:    Nicole Osei If you are having any type of anesthesia you are to have nothing to eat or drink after midnight the night before your surgery. This includes gum, mints, water or smoking or chewing tobacco.  The only exception to this is a small sip of water to take with any morning dose of heart, blood pressure, or seizure medications. No alcoholic beverages for 24 hours prior to surgery.  Bring a list of all medications you take, along with the dose of the medications and how often you take it. If more convenient bring the pharmacy bottles in a zip lock bag.  Brush your teeth but do not swallow water.  Bring your eyeglasses and case with you. No contacts are to be worn the day of surgery. You also may bring your hearing aids.   Most surgical procedures involving anesthesia will require that you remove your dentures prior to surgery.  If you are on C-PAP or Bi-PAP at home and plan on staying in the hospital overnight for your surgery please bring the machine with you. · Do not wear any jewelry or body piercings day of surgery. Also, NO lotion, perfume or deodorant to be used the day of surgery. No nail polish on the operative extremity (arm/leg surgeries)    · Do not bring any valuables such as jewelry, cash, or credit cards. If you are staying overnight with us, please bring a small bag of personal items.  Please wear loose, comfortable clothing. If you are potentially going to have a cast or brace bring clothing that will fit over them.  In case of illness - If you have cold or flu like symptoms (high fever, runny nose, sore throat, cough, etc.) rash, nausea, vomiting, loose stools, and/or recent contact with someone who has a contagious disease (chicken pox, measles, etc.) Please call your doctor before coming to the hospital.     If your child is having surgery please make arrangements for any other children to be cared for at home on the day of surgery. Other children are not permitted in recovery room and we want you to be able to spend time with the patient. If other arrangements are not available then we suggest that you have a second adult to stay in the waiting room. Day of Surgery/Procedure:    As a patient at [facility] you can expect quality medical and nursing care that is centered on your individual needs. Our goal is to make your surgical experience as comfortable as possible    . Transportation After Your Surgery/Procedure: You will need a friend or family member to drive you home after your procedure. Your  must be 25years of age or older and able to sign off on your discharge instructions.   A taxi cab or any other form of public transportation is not acceptable. Your friend or family member must stay at the hospital throughout your procedure. Someone must remain with you for the first 24 hours after your surgery if you receive anesthesia or medication. If you do not have someone to stay with you, your procedure may be cancelled.       If you have any other questions regarding your procedure or the day of surgery, please call [phone]      _________________________  ____________________________  Signature (Patient)              Signature (Provider) & date

## 2020-10-19 ENCOUNTER — OFFICE VISIT (OUTPATIENT)
Dept: FAMILY MEDICINE CLINIC | Age: 70
End: 2020-10-19
Payer: MEDICARE

## 2020-10-19 VITALS
HEART RATE: 57 BPM | BODY MASS INDEX: 28.38 KG/M2 | SYSTOLIC BLOOD PRESSURE: 137 MMHG | WEIGHT: 200.6 LBS | DIASTOLIC BLOOD PRESSURE: 76 MMHG | OXYGEN SATURATION: 96 % | TEMPERATURE: 97.9 F

## 2020-10-19 PROCEDURE — G8484 FLU IMMUNIZE NO ADMIN: HCPCS | Performed by: NURSE PRACTITIONER

## 2020-10-19 PROCEDURE — G8427 DOCREV CUR MEDS BY ELIG CLIN: HCPCS | Performed by: NURSE PRACTITIONER

## 2020-10-19 PROCEDURE — 99214 OFFICE O/P EST MOD 30 MIN: CPT | Performed by: NURSE PRACTITIONER

## 2020-10-19 PROCEDURE — 1036F TOBACCO NON-USER: CPT | Performed by: NURSE PRACTITIONER

## 2020-10-19 PROCEDURE — 1123F ACP DISCUSS/DSCN MKR DOCD: CPT | Performed by: NURSE PRACTITIONER

## 2020-10-19 PROCEDURE — 4040F PNEUMOC VAC/ADMIN/RCVD: CPT | Performed by: NURSE PRACTITIONER

## 2020-10-19 PROCEDURE — 3017F COLORECTAL CA SCREEN DOC REV: CPT | Performed by: NURSE PRACTITIONER

## 2020-10-19 PROCEDURE — 93000 ELECTROCARDIOGRAM COMPLETE: CPT | Performed by: NURSE PRACTITIONER

## 2020-10-19 PROCEDURE — G8417 CALC BMI ABV UP PARAM F/U: HCPCS | Performed by: NURSE PRACTITIONER

## 2020-10-19 ASSESSMENT — PATIENT HEALTH QUESTIONNAIRE - PHQ9
SUM OF ALL RESPONSES TO PHQ QUESTIONS 1-9: 0
2. FEELING DOWN, DEPRESSED OR HOPELESS: 0
SUM OF ALL RESPONSES TO PHQ QUESTIONS 1-9: 0
SUM OF ALL RESPONSES TO PHQ9 QUESTIONS 1 & 2: 0
1. LITTLE INTEREST OR PLEASURE IN DOING THINGS: 0
SUM OF ALL RESPONSES TO PHQ QUESTIONS 1-9: 0

## 2020-10-19 NOTE — PROGRESS NOTES
PHYSICAL EXAM:     · Constitutional: Tyler Rick is oriented to person, place, and time. Vital signs are normal. Appears well-developed and well-nourished. · HEENT:   · Head: Normocephalic and atraumatic. Right Ear: Hearing and external ear normal. TM normal  Canal normal  · Left Ear: Hearing and external ear normal. TM normal Canal normal  · Nose: Nares normal. Septum midline. No drainage or sinus tenderness. Mucosa pink and moist  · Mouth/Throat: Oropharynx- No erythema, no exudate. Uvula midline, no erythema, no edema. Mucous membranes are pink and moist.   · Eyes:PERRL, EOMI, Conjunctiva normal, No discharge. · Neck: Full passive range of motion. Non-tender on palpation. Neck supple. No thyromegaly present. Trachea normal.  · Cardiovascular: Normal rate, regular rhythm, S1, S2, no murmur, no gallop, no friction rub, intact distal pulses. Bilateral Carotid bruit  · Pulmonary/Chest: Breath sounds are clear throughout, No respiratory distress, No wheezing, No chest tenderness. Effort normal  · Abdominal: Soft. Normal appearance, bowel sounds are present and normoactive. There is no hepatosplenomegaly. There is no tenderness. There is no CVA tenderness. · Musculoskeletal: Extremities appear regular and symmetric. No evident masses, lesions, foreign bodies, or other abnormalities. No edema. No tenderness on palpation. Joints are stable. Full ROM, strength and tone are within normal limits. · Lymphadenopathy: No lymphadenopathy noted. · Neurological: Alert and oriented to person, place, and time. Normal motor function, Normal sensory function, No focal deficits noted. He has normal strength. · Skin: Skin is warm, dry and intact. No obvious lesions on exposed skin  · Psychiatric: Normal mood and affect. Speech is normal and behavior is normal.     Nursing note and vitals reviewed.   Blood pressure 137/76, pulse 57, temperature 97.9 °F (36.6 °C), temperature source Temporal, weight 200 lb 9.6 oz (91 kg), SpO2 96 %. Body mass index is 28.38 kg/m². Wt Readings from Last 3 Encounters:   10/19/20 200 lb 9.6 oz (91 kg)   10/12/20 199 lb (90.3 kg)   07/14/20 192 lb (87.1 kg)     BP Readings from Last 3 Encounters:   10/19/20 137/76   10/12/20 (!) 170/61   06/08/20 130/72       No results found for this visit on 10/19/20. Completed Orders/Prescriptions   No orders of the defined types were placed in this encounter. AssessmentPlan/Medical Decision Making     1. Bilateral carotid bruits  - f/u care with Dr. Franci Packer  - left carotid endarterectomy scheduled  - patient does not appear to be at increased risk and letter of clearance provided    2. Preoperative clearance  - EKG abnormal, but unchanged from previous EKG's  - 04014 - MD ELECTROCARDIOGRAM, COMPLETE    3. Screening for colorectal cancer  - Cologuard (For External Results Only); Future      Return in about 2 months (around 12/19/2020) for Routine follow up of chronic conditions. 1.  Rodolfo Cleveland received counseling on the following healthy behaviors: nutrition, exercise and medication adherence  2. Patient given educational materials - see patient instructions  3. Was a self-tracking handout given in paper form or via [employer]? No  If yes, see orders or list here. 4.  Discussed use, benefit, and side effects of prescribed medications. Barriers to medication compliance addressed. All patient questions answered. Pt voiced understanding. 5.  Reviewed prior labs, imaging, consultation, follow up, and health maintenance  6. Continue current medications, diet and exercise. 7. Discussed use, benefit, and side effects of prescribed medications. Barriers to medication compliance addressed. All her questions were answered. Pt voiced understanding. Rodolfo Cleveland will continue current medications, diet and exercise.       Of the 25 minute duration appointment visit, Wyatt Mehta CNP spent at least 50% of the face-to-face time in counseling, explanation of diagnosis, planning of further management, and answering all questions. Signed:  Kalyan Gamez CNP    This note is created with the assistance of a speech-recognition program.  While intending to generate a document that actually reflects the content of the visit, no guarantees can be provided that every mistake has been identified and corrected by editing.

## 2020-10-19 NOTE — LETTER
[address]  [address] [facility], [address] [place] [address] [address]  Maurisio Sparrow  P: [phone]  F: Durga Kerr, BRENNAN    10/19/20  Marjorie Verma  :1950    Dr. Faustino Vasquez         [address] [facility]    Dear Dr. Wood Pedro was seen in my office today. He will be having left carotid endarterectomy surgery in the near future. He denies any cardiovascular symptoms at this time. He appears to be at no increased risk for the surgery. No further work up appears necessary at this time. Past Medical History:   Diagnosis Date    Arthritis     Spine, left wrist    CAD (coronary artery disease)     heart attack x 2 with stent    History of heart artery stent     x 1     Hyperlipidemia     Lymphoma (HCC)     Right side of neck, no chemo or radiation    MI (myocardial infarction) ([facility] [address]     approx.  per pt.  Wears dentures      Current Outpatient Medications   Medication Sig Dispense Refill    aspirin 325 MG EC tablet Take 1 tablet by mouth daily. 30 tablet 12    lisinopril (PRINIVIL;ZESTRIL) 5 MG tablet Take 5 mg by mouth daily.  simvastatin (ZOCOR) 40 MG tablet Take 40 mg by mouth nightly       Omeprazole 20 MG TBEC Take 20 tablets by mouth daily.  metoprolol (TOPROL-XL) 25 MG XL tablet Take 25 mg by mouth daily.           Sincerely,                           BRENNAN Mccrary

## 2020-10-21 ENCOUNTER — TELEPHONE (OUTPATIENT)
Dept: FAMILY MEDICINE CLINIC | Age: 70
End: 2020-10-21

## 2020-10-21 NOTE — TELEPHONE ENCOUNTER
Dane Tubbs from Dr. Noemi Fritz office wants to know if you have cleared pt for surgery that is 10/26/20?

## 2020-10-22 ENCOUNTER — HOSPITAL ENCOUNTER (OUTPATIENT)
Dept: PREADMISSION TESTING | Age: 70
Setting detail: SPECIMEN
Discharge: HOME | End: 2020-10-26
Payer: MEDICARE

## 2020-10-22 PROCEDURE — U0003 INFECTIOUS AGENT DETECTION BY NUCLEIC ACID (DNA OR RNA); SEVERE ACUTE RESPIRATORY SYNDROME CORONAVIRUS 2 (SARS-COV-2) (CORONAVIRUS DISEASE [COVID-19]), AMPLIFIED PROBE TECHNIQUE, MAKING USE OF HIGH THROUGHPUT TECHNOLOGIES AS DESCRIBED BY CMS-2020-01-R: HCPCS

## 2020-10-23 ENCOUNTER — ANESTHESIA EVENT (OUTPATIENT)
Dept: OPERATING ROOM | Age: 70
DRG: 039 | End: 2020-10-23
Payer: MEDICARE

## 2020-10-23 LAB — SARS-COV-2, NAA: NOT DETECTED

## 2020-10-26 ENCOUNTER — HOSPITAL ENCOUNTER (INPATIENT)
Age: 70
LOS: 1 days | Discharge: HOME | DRG: 039 | End: 2020-10-27
Attending: SURGERY | Admitting: SURGERY
Payer: MEDICARE

## 2020-10-26 ENCOUNTER — ANESTHESIA (OUTPATIENT)
Dept: OPERATING ROOM | Age: 70
DRG: 039 | End: 2020-10-26
Payer: MEDICARE

## 2020-10-26 VITALS — OXYGEN SATURATION: 100 % | DIASTOLIC BLOOD PRESSURE: 63 MMHG | TEMPERATURE: 100.2 F | SYSTOLIC BLOOD PRESSURE: 133 MMHG

## 2020-10-26 PROBLEM — I65.22 RECURRENT CAROTID STENOSIS, LEFT: Status: ACTIVE | Noted: 2020-10-26

## 2020-10-26 LAB
HCT VFR BLD CALC: 43.9 % (ref 40.7–50.3)
HEMOGLOBIN: 13.9 G/DL (ref 13–17)
MCH RBC QN AUTO: 31 PG (ref 25.2–33.5)
MCHC RBC AUTO-ENTMCNC: 31.7 G/DL (ref 28.4–34.8)
MCV RBC AUTO: 97.8 FL (ref 82.6–102.9)
NRBC AUTOMATED: 0 PER 100 WBC
PDW BLD-RTO: 12.1 % (ref 11.8–14.4)
PLATELET # BLD: 96 K/UL (ref 138–453)
PMV BLD AUTO: 10.9 FL (ref 8.1–13.5)
RBC # BLD: 4.49 M/UL (ref 4.21–5.77)
WBC # BLD: 16.9 K/UL (ref 3.5–11.3)

## 2020-10-26 PROCEDURE — 2580000003 HC RX 258: Performed by: ANESTHESIOLOGY

## 2020-10-26 PROCEDURE — 2720000010 HC SURG SUPPLY STERILE: Performed by: SURGERY

## 2020-10-26 PROCEDURE — 03CL0ZZ EXTIRPATION OF MATTER FROM LEFT INTERNAL CAROTID ARTERY, OPEN APPROACH: ICD-10-PCS | Performed by: SURGERY

## 2020-10-26 PROCEDURE — 99222 1ST HOSP IP/OBS MODERATE 55: CPT | Performed by: NURSE PRACTITIONER

## 2020-10-26 PROCEDURE — 2500000003 HC RX 250 WO HCPCS: Performed by: NURSE ANESTHETIST, CERTIFIED REGISTERED

## 2020-10-26 PROCEDURE — 3700000001 HC ADD 15 MINUTES (ANESTHESIA): Performed by: SURGERY

## 2020-10-26 PROCEDURE — 2580000003 HC RX 258: Performed by: SURGERY

## 2020-10-26 PROCEDURE — 7100000001 HC PACU RECOVERY - ADDTL 15 MIN: Performed by: SURGERY

## 2020-10-26 PROCEDURE — 6360000002 HC RX W HCPCS: Performed by: SURGERY

## 2020-10-26 PROCEDURE — 88305 TISSUE EXAM BY PATHOLOGIST: CPT

## 2020-10-26 PROCEDURE — 3600000002 HC SURGERY LEVEL 2 BASE: Performed by: SURGERY

## 2020-10-26 PROCEDURE — 7100000000 HC PACU RECOVERY - FIRST 15 MIN: Performed by: SURGERY

## 2020-10-26 PROCEDURE — 2500000003 HC RX 250 WO HCPCS: Performed by: SURGERY

## 2020-10-26 PROCEDURE — 6360000002 HC RX W HCPCS: Performed by: ANESTHESIOLOGY

## 2020-10-26 PROCEDURE — 3700000000 HC ANESTHESIA ATTENDED CARE: Performed by: SURGERY

## 2020-10-26 PROCEDURE — 88342 IMHCHEM/IMCYTCHM 1ST ANTB: CPT

## 2020-10-26 PROCEDURE — 6360000002 HC RX W HCPCS: Performed by: NURSE ANESTHETIST, CERTIFIED REGISTERED

## 2020-10-26 PROCEDURE — 2709999900 HC NON-CHARGEABLE SUPPLY: Performed by: SURGERY

## 2020-10-26 PROCEDURE — 2060000000 HC ICU INTERMEDIATE R&B

## 2020-10-26 PROCEDURE — 85027 COMPLETE CBC AUTOMATED: CPT

## 2020-10-26 PROCEDURE — 07B20ZX EXCISION OF LEFT NECK LYMPHATIC, OPEN APPROACH, DIAGNOSTIC: ICD-10-PCS | Performed by: SURGERY

## 2020-10-26 PROCEDURE — 6360000002 HC RX W HCPCS

## 2020-10-26 PROCEDURE — 88304 TISSUE EXAM BY PATHOLOGIST: CPT

## 2020-10-26 PROCEDURE — 6370000000 HC RX 637 (ALT 250 FOR IP): Performed by: SURGERY

## 2020-10-26 PROCEDURE — 94761 N-INVAS EAR/PLS OXIMETRY MLT: CPT

## 2020-10-26 PROCEDURE — 2700000000 HC OXYGEN THERAPY PER DAY

## 2020-10-26 PROCEDURE — 3600000012 HC SURGERY LEVEL 2 ADDTL 15MIN: Performed by: SURGERY

## 2020-10-26 PROCEDURE — 2580000003 HC RX 258: Performed by: NURSE ANESTHETIST, CERTIFIED REGISTERED

## 2020-10-26 PROCEDURE — 88341 IMHCHEM/IMCYTCHM EA ADD ANTB: CPT

## 2020-10-26 RX ORDER — ATORVASTATIN CALCIUM 20 MG/1
20 TABLET, FILM COATED ORAL NIGHTLY
Status: DISCONTINUED | OUTPATIENT
Start: 2020-10-26 | End: 2020-10-27 | Stop reason: HOSPADM

## 2020-10-26 RX ORDER — ONDANSETRON 2 MG/ML
4 INJECTION INTRAMUSCULAR; INTRAVENOUS
Status: DISCONTINUED | OUTPATIENT
Start: 2020-10-26 | End: 2020-10-26 | Stop reason: HOSPADM

## 2020-10-26 RX ORDER — GLYCOPYRROLATE 1 MG/5 ML
SYRINGE (ML) INTRAVENOUS PRN
Status: DISCONTINUED | OUTPATIENT
Start: 2020-10-26 | End: 2020-10-26 | Stop reason: SDUPTHER

## 2020-10-26 RX ORDER — PANTOPRAZOLE SODIUM 40 MG/1
40 TABLET, DELAYED RELEASE ORAL
Status: DISCONTINUED | OUTPATIENT
Start: 2020-10-27 | End: 2020-10-27 | Stop reason: HOSPADM

## 2020-10-26 RX ORDER — SODIUM CHLORIDE 9 MG/ML
INJECTION, SOLUTION INTRAVENOUS CONTINUOUS
Status: DISCONTINUED | OUTPATIENT
Start: 2020-10-27 | End: 2020-10-26

## 2020-10-26 RX ORDER — FENTANYL CITRATE 50 UG/ML
INJECTION, SOLUTION INTRAMUSCULAR; INTRAVENOUS
Status: COMPLETED
Start: 2020-10-26 | End: 2020-10-26

## 2020-10-26 RX ORDER — MORPHINE SULFATE 2 MG/ML
2 INJECTION, SOLUTION INTRAMUSCULAR; INTRAVENOUS
Status: DISCONTINUED | OUTPATIENT
Start: 2020-10-26 | End: 2020-10-27 | Stop reason: HOSPADM

## 2020-10-26 RX ORDER — SIMVASTATIN 40 MG
40 TABLET ORAL NIGHTLY
Status: DISCONTINUED | OUTPATIENT
Start: 2020-10-26 | End: 2020-10-26

## 2020-10-26 RX ORDER — SODIUM CHLORIDE, SODIUM LACTATE, POTASSIUM CHLORIDE, CALCIUM CHLORIDE 600; 310; 30; 20 MG/100ML; MG/100ML; MG/100ML; MG/100ML
INJECTION, SOLUTION INTRAVENOUS CONTINUOUS
Status: DISCONTINUED | OUTPATIENT
Start: 2020-10-26 | End: 2020-10-27 | Stop reason: HOSPADM

## 2020-10-26 RX ORDER — HYDROMORPHONE HCL 110MG/55ML
0.25 PATIENT CONTROLLED ANALGESIA SYRINGE INTRAVENOUS EVERY 5 MIN PRN
Status: DISCONTINUED | OUTPATIENT
Start: 2020-10-26 | End: 2020-10-26 | Stop reason: HOSPADM

## 2020-10-26 RX ORDER — HYDROCODONE BITARTRATE AND ACETAMINOPHEN 5; 325 MG/1; MG/1
1 TABLET ORAL EVERY 4 HOURS PRN
Status: DISCONTINUED | OUTPATIENT
Start: 2020-10-26 | End: 2020-10-27 | Stop reason: HOSPADM

## 2020-10-26 RX ORDER — SODIUM CHLORIDE, SODIUM LACTATE, POTASSIUM CHLORIDE, CALCIUM CHLORIDE 600; 310; 30; 20 MG/100ML; MG/100ML; MG/100ML; MG/100ML
INJECTION, SOLUTION INTRAVENOUS CONTINUOUS
Status: DISCONTINUED | OUTPATIENT
Start: 2020-10-27 | End: 2020-10-26

## 2020-10-26 RX ORDER — MORPHINE SULFATE 4 MG/ML
4 INJECTION, SOLUTION INTRAMUSCULAR; INTRAVENOUS
Status: DISCONTINUED | OUTPATIENT
Start: 2020-10-26 | End: 2020-10-27 | Stop reason: HOSPADM

## 2020-10-26 RX ORDER — FENTANYL CITRATE 50 UG/ML
INJECTION, SOLUTION INTRAMUSCULAR; INTRAVENOUS PRN
Status: DISCONTINUED | OUTPATIENT
Start: 2020-10-26 | End: 2020-10-26 | Stop reason: SDUPTHER

## 2020-10-26 RX ORDER — HYDROMORPHONE HCL 110MG/55ML
0.5 PATIENT CONTROLLED ANALGESIA SYRINGE INTRAVENOUS EVERY 5 MIN PRN
Status: DISCONTINUED | OUTPATIENT
Start: 2020-10-26 | End: 2020-10-26 | Stop reason: HOSPADM

## 2020-10-26 RX ORDER — FENTANYL CITRATE 50 UG/ML
25 INJECTION, SOLUTION INTRAMUSCULAR; INTRAVENOUS EVERY 5 MIN PRN
Status: DISCONTINUED | OUTPATIENT
Start: 2020-10-26 | End: 2020-10-26

## 2020-10-26 RX ORDER — METOPROLOL SUCCINATE 25 MG/1
25 TABLET, EXTENDED RELEASE ORAL DAILY
Status: DISCONTINUED | OUTPATIENT
Start: 2020-10-27 | End: 2020-10-27 | Stop reason: HOSPADM

## 2020-10-26 RX ORDER — HEPARIN SODIUM 1000 [USP'U]/ML
INJECTION, SOLUTION INTRAVENOUS; SUBCUTANEOUS PRN
Status: DISCONTINUED | OUTPATIENT
Start: 2020-10-26 | End: 2020-10-26 | Stop reason: SDUPTHER

## 2020-10-26 RX ORDER — DEXAMETHASONE SODIUM PHOSPHATE 10 MG/ML
INJECTION, SOLUTION INTRAMUSCULAR; INTRAVENOUS PRN
Status: DISCONTINUED | OUTPATIENT
Start: 2020-10-26 | End: 2020-10-26 | Stop reason: SDUPTHER

## 2020-10-26 RX ORDER — LISINOPRIL 5 MG/1
5 TABLET ORAL DAILY
Status: DISCONTINUED | OUTPATIENT
Start: 2020-10-27 | End: 2020-10-27 | Stop reason: HOSPADM

## 2020-10-26 RX ORDER — ROCURONIUM BROMIDE 10 MG/ML
INJECTION, SOLUTION INTRAVENOUS PRN
Status: DISCONTINUED | OUTPATIENT
Start: 2020-10-26 | End: 2020-10-26 | Stop reason: SDUPTHER

## 2020-10-26 RX ORDER — PROPOFOL 10 MG/ML
INJECTION, EMULSION INTRAVENOUS PRN
Status: DISCONTINUED | OUTPATIENT
Start: 2020-10-26 | End: 2020-10-26 | Stop reason: SDUPTHER

## 2020-10-26 RX ORDER — LIDOCAINE HYDROCHLORIDE 10 MG/ML
1 INJECTION, SOLUTION EPIDURAL; INFILTRATION; INTRACAUDAL; PERINEURAL
Status: DISCONTINUED | OUTPATIENT
Start: 2020-10-27 | End: 2020-10-26 | Stop reason: HOSPADM

## 2020-10-26 RX ORDER — SODIUM CHLORIDE 0.9 % (FLUSH) 0.9 %
10 SYRINGE (ML) INJECTION EVERY 12 HOURS SCHEDULED
Status: DISCONTINUED | OUTPATIENT
Start: 2020-10-26 | End: 2020-10-26 | Stop reason: HOSPADM

## 2020-10-26 RX ORDER — FENTANYL CITRATE 50 UG/ML
50 INJECTION, SOLUTION INTRAMUSCULAR; INTRAVENOUS EVERY 5 MIN PRN
Status: DISCONTINUED | OUTPATIENT
Start: 2020-10-26 | End: 2020-10-26 | Stop reason: HOSPADM

## 2020-10-26 RX ORDER — SODIUM CHLORIDE 0.9 % (FLUSH) 0.9 %
10 SYRINGE (ML) INJECTION PRN
Status: DISCONTINUED | OUTPATIENT
Start: 2020-10-26 | End: 2020-10-27 | Stop reason: HOSPADM

## 2020-10-26 RX ORDER — LIDOCAINE HYDROCHLORIDE 10 MG/ML
INJECTION, SOLUTION EPIDURAL; INFILTRATION; INTRACAUDAL; PERINEURAL PRN
Status: DISCONTINUED | OUTPATIENT
Start: 2020-10-26 | End: 2020-10-26 | Stop reason: HOSPADM

## 2020-10-26 RX ORDER — PHENYLEPHRINE HCL IN 0.9% NACL 1 MG/10 ML
SYRINGE (ML) INTRAVENOUS PRN
Status: DISCONTINUED | OUTPATIENT
Start: 2020-10-26 | End: 2020-10-26 | Stop reason: SDUPTHER

## 2020-10-26 RX ORDER — PROTAMINE SULFATE 10 MG/ML
INJECTION, SOLUTION INTRAVENOUS PRN
Status: DISCONTINUED | OUTPATIENT
Start: 2020-10-26 | End: 2020-10-26 | Stop reason: SDUPTHER

## 2020-10-26 RX ORDER — HYDROCODONE BITARTRATE AND ACETAMINOPHEN 5; 325 MG/1; MG/1
2 TABLET ORAL EVERY 4 HOURS PRN
Status: DISCONTINUED | OUTPATIENT
Start: 2020-10-26 | End: 2020-10-27 | Stop reason: HOSPADM

## 2020-10-26 RX ORDER — FENTANYL CITRATE 50 UG/ML
25 INJECTION, SOLUTION INTRAMUSCULAR; INTRAVENOUS EVERY 5 MIN PRN
Status: DISCONTINUED | OUTPATIENT
Start: 2020-10-26 | End: 2020-10-27 | Stop reason: HOSPADM

## 2020-10-26 RX ORDER — MIDAZOLAM HYDROCHLORIDE 1 MG/ML
1 INJECTION INTRAMUSCULAR; INTRAVENOUS ONCE
Status: COMPLETED | OUTPATIENT
Start: 2020-10-26 | End: 2020-10-26

## 2020-10-26 RX ORDER — LIDOCAINE HYDROCHLORIDE 20 MG/ML
INJECTION, SOLUTION INFILTRATION; PERINEURAL PRN
Status: DISCONTINUED | OUTPATIENT
Start: 2020-10-26 | End: 2020-10-26 | Stop reason: SDUPTHER

## 2020-10-26 RX ORDER — SODIUM CHLORIDE 0.9 % (FLUSH) 0.9 %
10 SYRINGE (ML) INJECTION EVERY 12 HOURS SCHEDULED
Status: DISCONTINUED | OUTPATIENT
Start: 2020-10-26 | End: 2020-10-27 | Stop reason: HOSPADM

## 2020-10-26 RX ORDER — FENTANYL CITRATE 50 UG/ML
25 INJECTION, SOLUTION INTRAMUSCULAR; INTRAVENOUS EVERY 5 MIN PRN
Status: DISCONTINUED | OUTPATIENT
Start: 2020-10-26 | End: 2020-10-26 | Stop reason: HOSPADM

## 2020-10-26 RX ORDER — ONDANSETRON 2 MG/ML
INJECTION INTRAMUSCULAR; INTRAVENOUS PRN
Status: DISCONTINUED | OUTPATIENT
Start: 2020-10-26 | End: 2020-10-26 | Stop reason: SDUPTHER

## 2020-10-26 RX ORDER — ACETAMINOPHEN 325 MG/1
650 TABLET ORAL EVERY 4 HOURS PRN
Status: DISCONTINUED | OUTPATIENT
Start: 2020-10-26 | End: 2020-10-27 | Stop reason: HOSPADM

## 2020-10-26 RX ORDER — EPHEDRINE SULFATE/0.9% NACL/PF 50 MG/5 ML
SYRINGE (ML) INTRAVENOUS PRN
Status: DISCONTINUED | OUTPATIENT
Start: 2020-10-26 | End: 2020-10-26 | Stop reason: SDUPTHER

## 2020-10-26 RX ORDER — SODIUM CHLORIDE 0.9 % (FLUSH) 0.9 %
10 SYRINGE (ML) INJECTION PRN
Status: DISCONTINUED | OUTPATIENT
Start: 2020-10-26 | End: 2020-10-26 | Stop reason: HOSPADM

## 2020-10-26 RX ADMIN — Medication 0.2 MG: at 13:02

## 2020-10-26 RX ADMIN — Medication 50 MCG: at 12:50

## 2020-10-26 RX ADMIN — Medication 15 MG: at 12:25

## 2020-10-26 RX ADMIN — FENTANYL CITRATE 25 MCG: 50 INJECTION, SOLUTION INTRAMUSCULAR; INTRAVENOUS at 17:27

## 2020-10-26 RX ADMIN — Medication 25 MCG: at 15:14

## 2020-10-26 RX ADMIN — SUGAMMADEX 200 MG: 100 INJECTION, SOLUTION INTRAVENOUS at 15:36

## 2020-10-26 RX ADMIN — SODIUM CHLORIDE, POTASSIUM CHLORIDE, SODIUM LACTATE AND CALCIUM CHLORIDE: 600; 310; 30; 20 INJECTION, SOLUTION INTRAVENOUS at 18:09

## 2020-10-26 RX ADMIN — HEPARIN SODIUM 7000 UNITS: 1000 INJECTION, SOLUTION INTRAVENOUS; SUBCUTANEOUS at 14:18

## 2020-10-26 RX ADMIN — DEXAMETHASONE SODIUM PHOSPHATE 10 MG: 10 INJECTION INTRAMUSCULAR; INTRAVENOUS at 12:35

## 2020-10-26 RX ADMIN — Medication 10 MG: at 13:33

## 2020-10-26 RX ADMIN — LIDOCAINE HYDROCHLORIDE 100 MG: 20 INJECTION, SOLUTION INFILTRATION; PERINEURAL at 12:14

## 2020-10-26 RX ADMIN — CEFAZOLIN SODIUM 2 G: 10 INJECTION, POWDER, FOR SOLUTION INTRAVENOUS at 12:21

## 2020-10-26 RX ADMIN — PROPOFOL 140 MG: 10 INJECTION, EMULSION INTRAVENOUS at 12:14

## 2020-10-26 RX ADMIN — PROPOFOL 40 MG: 10 INJECTION, EMULSION INTRAVENOUS at 15:14

## 2020-10-26 RX ADMIN — PHENYLEPHRINE HYDROCHLORIDE 10 MCG/MIN: 10 INJECTION, SOLUTION INTRAMUSCULAR; INTRAVENOUS; SUBCUTANEOUS at 13:53

## 2020-10-26 RX ADMIN — Medication 100 MCG: at 12:55

## 2020-10-26 RX ADMIN — Medication 5 MG: at 13:00

## 2020-10-26 RX ADMIN — PROTAMINE SULFATE 15 MG: 10 INJECTION, SOLUTION INTRAVENOUS at 15:11

## 2020-10-26 RX ADMIN — CEFAZOLIN SODIUM 2 G: 10 INJECTION, POWDER, FOR SOLUTION INTRAVENOUS at 18:13

## 2020-10-26 RX ADMIN — ROCURONIUM BROMIDE 50 MG: 10 INJECTION, SOLUTION INTRAVENOUS at 12:14

## 2020-10-26 RX ADMIN — Medication 10 ML: at 21:00

## 2020-10-26 RX ADMIN — Medication 50 MCG: at 12:46

## 2020-10-26 RX ADMIN — Medication 100 MCG: at 12:59

## 2020-10-26 RX ADMIN — ONDANSETRON 4 MG: 2 INJECTION, SOLUTION INTRAMUSCULAR; INTRAVENOUS at 15:27

## 2020-10-26 RX ADMIN — Medication 100 MCG: at 12:14

## 2020-10-26 RX ADMIN — SODIUM CHLORIDE, POTASSIUM CHLORIDE, SODIUM LACTATE AND CALCIUM CHLORIDE: 600; 310; 30; 20 INJECTION, SOLUTION INTRAVENOUS at 15:48

## 2020-10-26 RX ADMIN — Medication 0.2 MG: at 13:08

## 2020-10-26 RX ADMIN — MIDAZOLAM 1 MG: 1 INJECTION INTRAMUSCULAR; INTRAVENOUS at 17:09

## 2020-10-26 RX ADMIN — Medication 5 MG: at 12:59

## 2020-10-26 RX ADMIN — SODIUM CHLORIDE, POTASSIUM CHLORIDE, SODIUM LACTATE AND CALCIUM CHLORIDE: 600; 310; 30; 20 INJECTION, SOLUTION INTRAVENOUS at 13:24

## 2020-10-26 RX ADMIN — SODIUM CHLORIDE, POTASSIUM CHLORIDE, SODIUM LACTATE AND CALCIUM CHLORIDE: 600; 310; 30; 20 INJECTION, SOLUTION INTRAVENOUS at 11:18

## 2020-10-26 RX ADMIN — MORPHINE SULFATE 4 MG: 4 INJECTION, SOLUTION INTRAMUSCULAR; INTRAVENOUS at 18:24

## 2020-10-26 RX ADMIN — SODIUM CHLORIDE, POTASSIUM CHLORIDE, SODIUM LACTATE AND CALCIUM CHLORIDE: 600; 310; 30; 20 INJECTION, SOLUTION INTRAVENOUS at 22:38

## 2020-10-26 RX ADMIN — Medication 5 MG: at 13:39

## 2020-10-26 RX ADMIN — Medication 5 MG: at 12:30

## 2020-10-26 RX ADMIN — SODIUM CHLORIDE, POTASSIUM CHLORIDE, SODIUM LACTATE AND CALCIUM CHLORIDE: 600; 310; 30; 20 INJECTION, SOLUTION INTRAVENOUS at 12:08

## 2020-10-26 RX ADMIN — Medication 25 MCG: at 14:25

## 2020-10-26 RX ADMIN — FENTANYL CITRATE 25 MCG: 50 INJECTION, SOLUTION INTRAMUSCULAR; INTRAVENOUS at 17:43

## 2020-10-26 RX ADMIN — Medication 10 MG: at 13:04

## 2020-10-26 RX ADMIN — Medication 10 MG: at 13:05

## 2020-10-26 ASSESSMENT — PULMONARY FUNCTION TESTS
PIF_VALUE: 20
PIF_VALUE: 8
PIF_VALUE: 24
PIF_VALUE: 24
PIF_VALUE: 27
PIF_VALUE: 23
PIF_VALUE: 21
PIF_VALUE: 21
PIF_VALUE: 16
PIF_VALUE: 23
PIF_VALUE: 21
PIF_VALUE: 22
PIF_VALUE: 17
PIF_VALUE: 22
PIF_VALUE: 22
PIF_VALUE: 19
PIF_VALUE: 20
PIF_VALUE: 17
PIF_VALUE: 24
PIF_VALUE: 22
PIF_VALUE: 15
PIF_VALUE: 24
PIF_VALUE: 13
PIF_VALUE: 23
PIF_VALUE: 1
PIF_VALUE: 21
PIF_VALUE: 17
PIF_VALUE: 29
PIF_VALUE: 21
PIF_VALUE: 15
PIF_VALUE: 3
PIF_VALUE: 22
PIF_VALUE: 21
PIF_VALUE: 16
PIF_VALUE: 19
PIF_VALUE: 27
PIF_VALUE: 22
PIF_VALUE: 20
PIF_VALUE: 22
PIF_VALUE: 21
PIF_VALUE: 21
PIF_VALUE: 22
PIF_VALUE: 23
PIF_VALUE: 19
PIF_VALUE: 15
PIF_VALUE: 16
PIF_VALUE: 24
PIF_VALUE: 1
PIF_VALUE: 22
PIF_VALUE: 21
PIF_VALUE: 17
PIF_VALUE: 3
PIF_VALUE: 22
PIF_VALUE: 17
PIF_VALUE: 22
PIF_VALUE: 17
PIF_VALUE: 30
PIF_VALUE: 17
PIF_VALUE: 17
PIF_VALUE: 15
PIF_VALUE: 22
PIF_VALUE: 27
PIF_VALUE: 28
PIF_VALUE: 29
PIF_VALUE: 0
PIF_VALUE: 22
PIF_VALUE: 21
PIF_VALUE: 23
PIF_VALUE: 18
PIF_VALUE: 21
PIF_VALUE: 17
PIF_VALUE: 24
PIF_VALUE: 23
PIF_VALUE: 17
PIF_VALUE: 20
PIF_VALUE: 20
PIF_VALUE: 16
PIF_VALUE: 22
PIF_VALUE: 21
PIF_VALUE: 17
PIF_VALUE: 19
PIF_VALUE: 21
PIF_VALUE: 24
PIF_VALUE: 20
PIF_VALUE: 24
PIF_VALUE: 17
PIF_VALUE: 21
PIF_VALUE: 20
PIF_VALUE: 22
PIF_VALUE: 18
PIF_VALUE: 23
PIF_VALUE: 23
PIF_VALUE: 27
PIF_VALUE: 21
PIF_VALUE: 21
PIF_VALUE: 24
PIF_VALUE: 23
PIF_VALUE: 21
PIF_VALUE: 22
PIF_VALUE: 21
PIF_VALUE: 21
PIF_VALUE: 17
PIF_VALUE: 21
PIF_VALUE: 22
PIF_VALUE: 27
PIF_VALUE: 22
PIF_VALUE: 23
PIF_VALUE: 23
PIF_VALUE: 28
PIF_VALUE: 1
PIF_VALUE: 21
PIF_VALUE: 24
PIF_VALUE: 22
PIF_VALUE: 24
PIF_VALUE: 20
PIF_VALUE: 24
PIF_VALUE: 22
PIF_VALUE: 17
PIF_VALUE: 22
PIF_VALUE: 23
PIF_VALUE: 21
PIF_VALUE: 22
PIF_VALUE: 23
PIF_VALUE: 17
PIF_VALUE: 26
PIF_VALUE: 16
PIF_VALUE: 0
PIF_VALUE: 21
PIF_VALUE: 24
PIF_VALUE: 22
PIF_VALUE: 1
PIF_VALUE: 0
PIF_VALUE: 18
PIF_VALUE: 18
PIF_VALUE: 22
PIF_VALUE: 15
PIF_VALUE: 21
PIF_VALUE: 23
PIF_VALUE: 22
PIF_VALUE: 22
PIF_VALUE: 21
PIF_VALUE: 21
PIF_VALUE: 17
PIF_VALUE: 23
PIF_VALUE: 22
PIF_VALUE: 22
PIF_VALUE: 4
PIF_VALUE: 19
PIF_VALUE: 14
PIF_VALUE: 21
PIF_VALUE: 21
PIF_VALUE: 22
PIF_VALUE: 24
PIF_VALUE: 22
PIF_VALUE: 21
PIF_VALUE: 7
PIF_VALUE: 21
PIF_VALUE: 23
PIF_VALUE: 17
PIF_VALUE: 22
PIF_VALUE: 17
PIF_VALUE: 21
PIF_VALUE: 29
PIF_VALUE: 24
PIF_VALUE: 22
PIF_VALUE: 21
PIF_VALUE: 24
PIF_VALUE: 21
PIF_VALUE: 26
PIF_VALUE: 21
PIF_VALUE: 21
PIF_VALUE: 22
PIF_VALUE: 22
PIF_VALUE: 23
PIF_VALUE: 17
PIF_VALUE: 21
PIF_VALUE: 4
PIF_VALUE: 4
PIF_VALUE: 21
PIF_VALUE: 21
PIF_VALUE: 27
PIF_VALUE: 20
PIF_VALUE: 15
PIF_VALUE: 0
PIF_VALUE: 1
PIF_VALUE: 20
PIF_VALUE: 27
PIF_VALUE: 20
PIF_VALUE: 17
PIF_VALUE: 22
PIF_VALUE: 17
PIF_VALUE: 22
PIF_VALUE: 21
PIF_VALUE: 17
PIF_VALUE: 22
PIF_VALUE: 24
PIF_VALUE: 21
PIF_VALUE: 1
PIF_VALUE: 21
PIF_VALUE: 22
PIF_VALUE: 1
PIF_VALUE: 21
PIF_VALUE: 23
PIF_VALUE: 18
PIF_VALUE: 24
PIF_VALUE: 22
PIF_VALUE: 28
PIF_VALUE: 21

## 2020-10-26 ASSESSMENT — PAIN SCALES - GENERAL
PAINLEVEL_OUTOF10: 0
PAINLEVEL_OUTOF10: 0
PAINLEVEL_OUTOF10: 10
PAINLEVEL_OUTOF10: 0
PAINLEVEL_OUTOF10: 8
PAINLEVEL_OUTOF10: 0
PAINLEVEL_OUTOF10: 0
PAINLEVEL_OUTOF10: 4

## 2020-10-26 ASSESSMENT — PAIN DESCRIPTION - LOCATION: LOCATION: NECK

## 2020-10-26 ASSESSMENT — PAIN DESCRIPTION - ORIENTATION: ORIENTATION: LEFT

## 2020-10-26 NOTE — H&P
History and Physical Update    Pt Name: Katherien Finney  MRN: [id number]  [place]: 1950  Date of evaluation: 10/26/2020      [x] I have reviewed the H & P found in Epic by Aaron Trevino CNP from 10/12/2020 which meets the criteria for an Interval History and Physical note. [x] I have examined  Katherine Finney a 72 y.o., male who is scheduled for a left carotid endarterectomy redo by Dr. Reinaldo Edward due to left carotid stenosis. The patient denies health changes since his appointment with Aaron Trevino CNP on 10/12/2020. Pt denies dizziness, lightheadedness, fever, chills, productive cough, SOB, chest pain, open sores, rashes, and wounds. Pt denies history of diabetes. Aspirin was last taken on 10/26/2020. WBC count was 18.1 k/uL on 10/12/2020 and 22.0 k/uL on 06/09/2020. Surgical clearance dated 10/19/2020 from [facility], LIZETH-CNP is in [address] Vital signs: BP (!) 142/57   Pulse (!) 45   Temp 97.1 °F (36.2 °C) (Temporal)   Resp 15   SpO2 99%  Dr. Lucita Kussmaul is aware of the pt's heart rate of 45 BPM.    Allergies:  Patient has no known allergies. Past medical history, surgical history, social history, and family history were reviewed and updated in EPIC as indicated. Medications:    Prior to Admission medications    Medication Sig Start Date End Date Taking? Authorizing Provider   aspirin 325 MG EC tablet Take 1 tablet by mouth daily. 4/17/14  Yes Skip Mcleod MD   lisinopril (PRINIVIL;ZESTRIL) 5 MG tablet Take 5 mg by mouth daily. Yes Historical Provider, MD   simvastatin (ZOCOR) 40 MG tablet Take 40 mg by mouth nightly    Yes Historical Provider, MD   Omeprazole 20 MG TBEC Take 20 tablets by mouth daily. Yes Historical Provider, MD   metoprolol (TOPROL-XL) 25 MG XL tablet Take 25 mg by mouth daily. Yes Historical Provider, MD       This is a 72 y.o. male who is pleasant, cooperative, alert and oriented x 3, in no acute distress. Heart: Asymptomatic bradycardia.  HR 45 BPM. Murmur 2/6. Regular rhythm without gallop or rub. Lungs: Normal respiratory effort, unlabored, and clear to auscultation without wheezes or rales bilaterally. Abdomen: Soft, non-tender, non-distended with active bowel sounds. Pedal pulses: 1+ bilaterally. Labs:  Recent Labs     10/12/20  1012   HGB 13.9   HCT 43.7   WBC 18.1*   MCV 98.2   *      K 4.5      CO2 26   BUN 11   CREATININE 0.93   INR 1.0   PROTIME 12.5   APTT 25.8       Recent Labs     10/22/20  0932   COVID19 Not Detected         David Chapman  JOSUE STANLEY  Electronically signed 10/26/2020 at [address], APRN - CNP    Nurse Practitioner    General Surgery    H&P    Signed    Date of Service:  10/12/2020 10:00 AM          Related encounter: Pre-Admission Testing Visit from 10/12/2020 in [facility] PRE-ADMIT TESTING          Signed        Expand All Collapse All     Show:Clear all  [x]Manual[x]Template[]Copied    Added by:  [x]LIZETH Lafleur CNP    []Samantha for details  History and Physical Service   [address] [place] [address]            Date of Evaluation:     10/12/2020  Patient name:              Fernando Traore  MRN:                           [id number]  YOB: 1950  PCP:                            LIZETH Merrill CNP     History Obtained From:      Patient, medical records     History of Present Illness: This is Fernando Traore a 72 y.o. male who presents today for a PRE-TESTING APPOINTMENT PRIOR TO A LEFT CAROTID ENDARTERECTOMY RE-DO  by Dr. Elida Duarte  for TREATMENT OF CAROTID STENOSIS. THE PATIENT  HE HAS HAD VISION CHANGES., LEFT ARM NUMBNESS AND TINGLING IN HIS LEFT FINGERS. HIS LEFT HAND WOULD \"TURN COLD \"  AND FINGERS WOULD TURN BLUE. HE ALSO DENIES ANY HEADACHES OR DIZZINESS. NO INCREASE IN FATIGUE. HE PRESENTS NOW FOR SURGICAL CORRECTION.        Functional Capacity:              1) Pt is  able to walk 2 city blocks or more on level ground without SOB. 2) Pt is  able to climb 2 flights of stairs without SOB. 3) Pt is  able to walk up a hill for 1-2 city blocks without SOB. HAS BACK AND LEG PAIN WHEN EXERTING HIMSELF BUT DENIES CHEST PAIN. Past Medical History:      Past Medical History        Past Medical History:   Diagnosis Date    Arthritis       Spine, left wrist    CAD (coronary artery disease)       heart attack x 2 with stent    History of heart artery stent       x 1     Hyperlipidemia      Lymphoma (HCC)       Right side of neck, no chemo or radiation    MI (myocardial infarction) ([facility] [address]       approx. 1992 per pt.  Wears dentures              Past Surgical History:      Past Surgical History         Past Surgical History:   Procedure Laterality Date    ARTHROTOMY         right foot big toe then removed    CAROTID ENDARTERECTOMY Left 4/16/2014    COLONOSCOPY         Polypectomy per pt.  CORONARY ANGIOPLASTY WITH STENT PLACEMENT         stent x1    EYE SURGERY Bilateral       Foreign body removal    FINGER AMPUTATION Left       ring finger    HYDROCELE EXCISION Left      LUMBAR DISC SURGERY         L5-S1    LYMPH NODE DISSECTION         right side of neck    RECTAL SURGERY         fissure            Medications Prior to Admission:      Home Medications   Prior to Admission medications    Medication Sig Start Date End Date Taking? Authorizing Provider   aspirin 325 MG EC tablet Take 1 tablet by mouth daily. 4/17/14     Freddie Guy MD   lisinopril (PRINIVIL;ZESTRIL) 5 MG tablet Take 5 mg by mouth daily. Historical Provider, MD   simvastatin (ZOCOR) 40 MG tablet Take 40 mg by mouth nightly        Historical Provider, MD   Omeprazole 20 MG TBEC Take 20 tablets by mouth daily. Historical Provider, MD   metoprolol (TOPROL-XL) 25 MG XL tablet Take 25 mg by mouth daily. Historical Provider, MD            Allergies:      Patient has no known allergies. input(s): POCGLU in the last 72 hours. General Appearance:  alert, well appearing, and in no acute distress  Mental status: oriented to person, place, and time with normal affect  Head:  normocephalic, atraumatic. Eye: no icterus, redness, pupils equal and reactive, extraocular eye movements intact, conjunctiva clear  Ear: normal external ear, no discharge, hearing intact  Nose:  no drainage noted  Mouth: mucous membranes moist  Neck: supple,HAS BILATERAL  carotid bruits LEFT IS + 4  RIGHT IS + 2 , thyroid not palpable  Lungs: Bilateral equal air entry, clear to ausculation, no wheezing, rales or rhonchi, normal effort  Cardiovascular: normal rate, regular rhythm, no murmur, gallop, rub.   Abdomen: Soft, nontender, nondistended, normal bowel sounds, X  4   Neurologic: There are no new focal motor or sensory deficits, normal muscle tone and bulk, no abnormal sensation, normal speech, cranial nerves II through XII grossly intact  Skin: No gross lesions, rashes, bruising or bleeding on exposed skin area  Extremities:  peripheral pulses  BARELY palpable IN LOWER EXTREMITIES BILATERALLY, no pedal edema or calf pain with palpation  Psych: normal affect      Investigations:       Laboratory Testing:  Recent Results         Recent Results (from the past 24 hour(s))   Urinalysis Reflex to Culture     Collection Time: 10/12/20  9:45 AM     Specimen: Urine, clean catch   Result Value Ref Range     Color, UA YELLOW YELLOW     Turbidity UA CLEAR CLEAR     Glucose, Ur NEGATIVE NEGATIVE     Bilirubin Urine NEGATIVE NEGATIVE     Ketones, Urine NEGATIVE NEGATIVE     Specific Gravity, UA 1.032 (H) 1.005 - 1.030     Urine Hgb NEGATIVE NEGATIVE     pH, UA 5.5 5.0 - 8.0     Protein, UA NEGATIVE NEGATIVE     Urobilinogen, Urine Normal Normal     Nitrite, Urine NEGATIVE NEGATIVE     Leukocyte Esterase, Urine NEGATIVE NEGATIVE     Urinalysis Comments NOT REPORTED     CBC Auto Differential     Collection Time: 10/12/20 10:12 AM   Result Value Ref Range     WBC 18.1 (H) 3.5 - 11.3 k/uL     RBC 4.45 4.21 - 5.77 m/uL     Hemoglobin 13.9 13.0 - 17.0 g/dL     Hematocrit 43.7 40.7 - 50.3 %     MCV 98.2 82.6 - 102.9 fL     MCH 31.2 25.2 - 33.5 pg     MCHC 31.8 28.0 - 38.0 g/dL     RDW 12.1 11.8 - 14.4 %     Platelets 901 (L) 361 - 453 k/uL     MPV 10.9 8.1 - 13.5 fL     NRBC Automated NOT REPORTED 0.0 per 100 WBC     Differential Type NOT REPORTED       Seg Neutrophils PENDING %     Lymphocytes PENDING %     Monocytes PENDING %     Eosinophils % PENDING %     Basophils PENDING %     Immature Granulocytes PENDING 0 %     Segs Absolute PENDING k/uL     Absolute Lymph # PENDING k/uL     Absolute Mono # PENDING k/uL     Absolute Eos # PENDING k/uL     Basophils Absolute PENDING 0.0 - 0.2 k/uL     Absolute Immature Granulocyte PENDING 0.00 - 0.30 k/uL     WBC Morphology NOT REPORTED       RBC Morphology NOT REPORTED       Platelet Estimate NOT REPORTED     BUN & Creatinine     Collection Time: 10/12/20 10:12 AM   Result Value Ref Range     BUN 11 8 - 23 mg/dL     CREATININE 0.93 0.70 - 1.20 mg/dL     GFR Non-African American >60 >60 mL/min     GFR African American >60 >60 mL/min     GFR Comment            GFR Staging NOT REPORTED     Electrolyte Panel     Collection Time: 10/12/20 10:12 AM   Result Value Ref Range     Sodium 140 135 - 144 mmol/L     Potassium 4.5 3.7 - 5.3 mmol/L     Chloride 106 98 - 107 mmol/L     CO2 26 20 - 31 mmol/L     Anion Gap 8 (L) 9 - 17 mmol/L               Recent Labs     10/12/20  1012   HGB 13.9   HCT 43.7   WBC 18.1*   MCV 98.2      K 4.5      CO2 26   BUN 11   CREATININE 0.93         Imaging/Diagnostics:     CARDIAC CLEARANCE ON THE SHORT CHART     Diagnosis:       1. BILATERAL CAROTID STENOSIS      Plans:      1.  LEFT CAROTID ENDARTRECTOMY        LIZETH Valente - HERB  10/12/2020  10:36 AM            Cosigned by:  Stephen Sierra MD at 10/12/2020  2:11 PM    Revision History                       Routing History

## 2020-10-26 NOTE — BRIEF OP NOTE
Brief Postoperative Note      Patient: Jeni Greco  YOB: 1950  MRN: [id number]    Date of Procedure: 10/26/2020    Pre-Op Diagnosis: DX SEVERE RECURRENT LEFT CAROTID STENOSIS    Post-Op Diagnosis: SEVERE RECURRENT LEFT CAROTID STENOSIS  LEFT CERVICAL LYMPHADENOPATHY         Procedure(s):  REDO LEFT CAROTID ENDARTERECTOMY WITH LEFT CERVICAL LYMPH NODE BIOPSY    Surgeon(s):  Nicole López MD    Assistant:  Surgical Assistant: Migdalia Anderson    Anesthesia: General    Estimated Blood Loss (mL): 697    Complications: None    Specimens:   ID Type Source Tests Collected by Time Destination   A : LEFT CERVICAL LYMPH NODE BIOPSY Tissue Carotid Arteries SURGICAL PATHOLOGY Nicole López MD 10/26/2020 1257    B : LEFT CAROTID PLAQUE Tissue Carotid Arteries SURGICAL PATHOLOGY Nicole López MD 10/26/2020 1307        Implants:  * No implants in log *      Drains: * No LDAs found *    Findings: Thrombus within the left carotid bifurcation and high-grade stenosis at the distal common and internal carotid. Left cervical lymphadenopathy.     Electronically signed by Nicole López MD on 10/26/2020 at 3:35 PM

## 2020-10-26 NOTE — PROGRESS NOTES
Patient continues to moan and c/o tingling and dry mouth. Also c/o pain to neck, abdomen and left arm with BP cuff. [facility]. Order received to give 25 mcg of Fentanyl as needed up to 100 mcg as needed for pain and to give another 1 mg Versed if needed for patient's behavior. Urinal placed and patient was encouraged to try and void. Patient unable to urinate. C/O pain to right arm while holding urinal in place. Writer assisted without patient able to urinate. Abdomen round but does not feel distended.

## 2020-10-26 NOTE — PROGRESS NOTES
Patient anxious. Keeps repeating that he has never felt this way. When questioned states \"mouth is dry\"  And \"I'm tingling all over my body\"  \"I feel hot no I feel cold\". Also keeps repeating that he is shaking so much that he can't hold still. No visible shaking observed by writer. Dr. Sequeira Notice paged.   Awaiting call back

## 2020-10-26 NOTE — ANESTHESIA POSTPROCEDURE EVALUATION
Department of Anesthesiology  Postprocedure Note    Patient: Sydell Bloch  MRN: [id number]  YOB: 1950  Date of evaluation: 10/26/2020  Time:  5:16 PM     Procedure Summary     Date:  10/26/20 Room / Location:  [place] [address] OR  / [facility] - INPATIENT    Anesthesia Start:  7458 Anesthesia Stop:  0287    Procedure:  REDO LEFT CAROTID ENDARTERECTOMY WITH LEFT CERVICAL LYMPH NODE BIOPSY (Left Neck) Diagnosis:  (DX LEFT CAROTID STENOSIS    REDO)    Surgeon:  Jeff Becerra MD Responsible Provider:  Araceli Hernandez MD    Anesthesia Type:  general ASA Status:  4          Anesthesia Type: general    [place] Phase I: [place] Score: 10    [place] Phase II:      Last vitals: Reviewed and per EMR flowsheets.        Anesthesia Post Evaluation    Complications: no

## 2020-10-26 NOTE — ANESTHESIA PRE PROCEDURE
Department of Anesthesiology  Preprocedure Note       Name:  Jeni Greco   Age:  72 y.o.  :  1950                                          MRN:  [id number]         Date:  10/26/2020      Surgeon: Suzy Martinez):  Nicole López MD    Procedure: Procedure(s):  LEFT CAROTID ENDARTERECTOMY    Medications prior to admission:   Prior to Admission medications    Medication Sig Start Date End Date Taking? Authorizing Provider   aspirin 325 MG EC tablet Take 1 tablet by mouth daily. 14   Nicole López MD   lisinopril (PRINIVIL;ZESTRIL) 5 MG tablet Take 5 mg by mouth daily. Historical Provider, MD   simvastatin (ZOCOR) 40 MG tablet Take 40 mg by mouth nightly     Historical Provider, MD   Omeprazole 20 MG TBEC Take 20 tablets by mouth daily. Historical Provider, MD   metoprolol (TOPROL-XL) 25 MG XL tablet Take 25 mg by mouth daily. Historical Provider, MD       Current medications:    No current facility-administered medications for this encounter. Current Outpatient Medications   Medication Sig Dispense Refill    aspirin 325 MG EC tablet Take 1 tablet by mouth daily. 30 tablet 12    lisinopril (PRINIVIL;ZESTRIL) 5 MG tablet Take 5 mg by mouth daily.  simvastatin (ZOCOR) 40 MG tablet Take 40 mg by mouth nightly       Omeprazole 20 MG TBEC Take 20 tablets by mouth daily.  metoprolol (TOPROL-XL) 25 MG XL tablet Take 25 mg by mouth daily.          Allergies:  No Known Allergies    Problem List:    Patient Active Problem List   Diagnosis Code    Essential hypertension I10    Carotid artery stenosis I65.29    GERD (gastroesophageal reflux disease) K21.9    Coronary artery disease involving native coronary artery of native heart without angina pectoris I25.10    Tobacco abuse Z72.0    Back pain M54.9    Pneumonia of right upper lobe due to infectious organism J18.9    Acute hypoxemic respiratory failure (HCC) J96.01    Normocytic anemia D64.9    Mediastinal lymphadenopathy R59.0  Hyperglycemia R73.9    Thrombocytopenia (HCC) D69.6    Human metapneumovirus (hMPV) pneumonia J12.3    Sus[facility] J44.9    Hyperlipidemia E78.5       Past Medical History:        Diagnosis Date    Arthritis     Spine, left wrist    CAD (coronary artery disease)     heart attack x 2 with stent    History of heart artery stent     x 1     Hyperlipidemia     Lymphoma (HCC)     Right side of neck, no chemo or radiation    MI (myocardial infarction) ([facility]     approx. 1992 per pt.  Wears dentures        Past Surgical History:        Procedure Laterality Date    ARTHROTOMY      right foot big toe then removed    CAROTID ENDARTERECTOMY Left 2014    COLONOSCOPY      Polypectomy per pt.  CORONARY ANGIOPLASTY WITH STENT PLACEMENT      stent x1    EYE SURGERY Bilateral     Foreign body removal    FINGER AMPUTATION Left     ring finger    HYDROCELE EXCISION Left     LUMBAR DISC SURGERY      L5-S1    LYMPH NODE DISSECTION      right side of neck    RECTAL SURGERY      fissure       Social History:    Social History     Tobacco Use    Smoking status: Former Smoker     Packs/day: 0.50     Years: 24.00     Pack years: 12.00     Types: Cigarettes     Last attempt to quit: 3/12/2020     Years since quittin.6    Smokeless tobacco: Never Used   Substance Use Topics    Alcohol use: Yes     Alcohol/week: 21.0 standard drinks     Types: 21 Cans of beer per week                                Counseling given: Not Answered      Vital Signs (Current): There were no vitals filed for this visit.                                            BP Readings from Last 3 Encounters:   10/19/20 137/76   10/12/20 (!) 170/61   20 130/72       NPO Status:                                                                                 BMI:   Wt Readings from Last 3 Encounters:   10/19/20 200 lb 9.6 oz (91 kg)   10/12/20 199 lb (90.3 kg) 07/14/20 192 lb (87.1 kg)     There is no height or weight on file to calculate BMI.    CBC:   Lab Results   Component Value Date    WBC 18.1 10/12/2020    RBC 4.45 10/12/2020    RBC 4.66 12/04/2011    HGB 13.9 10/12/2020    HCT 43.7 10/12/2020    MCV 98.2 10/12/2020    RDW 12.1 10/12/2020     10/12/2020     12/04/2011       CMP:   Lab Results   Component Value Date     10/12/2020    K 4.5 10/12/2020     10/12/2020    CO2 26 10/12/2020    BUN 11 10/12/2020    CREATININE 0.93 10/12/2020    GFRAA >60 10/12/2020    LABGLOM >60 10/12/2020    GLUCOSE 93 06/09/2020    GLUCOSE 121 12/04/2011    PROT 6.5 06/09/2020    CALCIUM 9.4 06/09/2020    BILITOT 0.61 06/09/2020    ALKPHOS 64 06/09/2020    AST 21 06/09/2020    ALT 19 06/09/2020       POC Tests: No results for input(s): POCGLU, POCNA, POCK, POCCL, POCBUN, POCHEMO, POCHCT in the last 72 hours.     Coags:   Lab Results   Component Value Date    PROTIME 12.5 10/12/2020    INR 1.0 10/12/2020    APTT 25.8 10/12/2020       HCG (If Applicable): No results found for: PREGTESTUR, PREGSERUM, HCG, HCGQUANT     ABGs: No results found for: PHART, PO2ART, FVP7OOS, KCN6IAB, BEART, T4CBOKQW     Type & Screen (If Applicable):  No results found for: LABABO, LABRH    Drug/Infectious Status (If Applicable):  Lab Results   Component Value Date    HEPCAB NONREACTIVE 06/09/2020       COVID-19 Screening (If Applicable):   Lab Results   Component Value Date    COVID19 Not Detected 10/22/2020         Anesthesia Evaluation   no history of anesthetic complications:   Airway: Mallampati: II  TM distance: >3 FB   Neck ROM: full  Mouth opening: > = 3 FB Dental:    (+) upper dentures and lower dentures      Pulmonary:   (+) COPD:                             Cardiovascular:    (+) hypertension:, CAD:, CABG/stent:, hyperlipidemia         Beta Blocker:  Dose within 24 Hrs         Neuro/Psych:                ROS comment: Bilateral carotid artery stenosis GI/Hepatic/Renal:   (+) GERD: well controlled,           Endo/Other:                     Abdominal:           Vascular:   + PVD, aortic or cerebral, . ROS comment: Symptomatic carotid artery stenosis. Anesthesia Plan      general     ASA 4       Induction: intravenous. arterial line    Anesthetic plan and risks discussed with patient. Use of blood products discussed with patient whom consented to blood products.                    Robbie Hansen MD   10/26/2020

## 2020-10-26 NOTE — PROGRESS NOTES
Dr. Michelle Lemus at bedside to evaluate. Order received for 1 mg Versed for patient's anxiety and stress.

## 2020-10-27 VITALS
HEIGHT: 71 IN | DIASTOLIC BLOOD PRESSURE: 58 MMHG | HEART RATE: 72 BPM | WEIGHT: 199 LBS | OXYGEN SATURATION: 97 % | SYSTOLIC BLOOD PRESSURE: 121 MMHG | RESPIRATION RATE: 16 BRPM | BODY MASS INDEX: 27.86 KG/M2 | TEMPERATURE: 97.5 F

## 2020-10-27 LAB
ANION GAP SERPL CALCULATED.3IONS-SCNC: 8 MMOL/L (ref 9–17)
BUN BLDV-MCNC: 14 MG/DL (ref 8–23)
BUN/CREAT BLD: 16 (ref 9–20)
CALCIUM SERPL-MCNC: 9.1 MG/DL (ref 8.6–10.4)
CHLORIDE BLD-SCNC: 106 MMOL/L (ref 98–107)
CO2: 25 MMOL/L (ref 20–31)
CREAT SERPL-MCNC: 0.88 MG/DL (ref 0.7–1.2)
GFR AFRICAN AMERICAN: >60 ML/MIN
GFR NON-AFRICAN AMERICAN: >60 ML/MIN
GFR SERPL CREATININE-BSD FRML MDRD: ABNORMAL ML/MIN/{1.73_M2}
GFR SERPL CREATININE-BSD FRML MDRD: ABNORMAL ML/MIN/{1.73_M2}
GLUCOSE BLD-MCNC: 166 MG/DL (ref 70–99)
HCT VFR BLD CALC: 35.5 % (ref 40.7–50.3)
HEMOGLOBIN: 11.1 G/DL (ref 13–17)
MAGNESIUM: 1.7 MG/DL (ref 1.6–2.6)
MCH RBC QN AUTO: 30.6 PG (ref 25.2–33.5)
MCHC RBC AUTO-ENTMCNC: 31.3 G/DL (ref 28.4–34.8)
MCV RBC AUTO: 97.8 FL (ref 82.6–102.9)
NRBC AUTOMATED: 0 PER 100 WBC
PDW BLD-RTO: 12.3 % (ref 11.8–14.4)
PHOSPHORUS: 3.5 MG/DL (ref 2.5–4.5)
PLATELET # BLD: 112 K/UL (ref 138–453)
PMV BLD AUTO: 11.2 FL (ref 8.1–13.5)
POTASSIUM SERPL-SCNC: 4.2 MMOL/L (ref 3.7–5.3)
RBC # BLD: 3.63 M/UL (ref 4.21–5.77)
SODIUM BLD-SCNC: 139 MMOL/L (ref 135–144)
WBC # BLD: 20.2 K/UL (ref 3.5–11.3)

## 2020-10-27 PROCEDURE — 2700000000 HC OXYGEN THERAPY PER DAY

## 2020-10-27 PROCEDURE — 99232 SBSQ HOSP IP/OBS MODERATE 35: CPT | Performed by: INTERNAL MEDICINE

## 2020-10-27 PROCEDURE — 83735 ASSAY OF MAGNESIUM: CPT

## 2020-10-27 PROCEDURE — 85027 COMPLETE CBC AUTOMATED: CPT

## 2020-10-27 PROCEDURE — 2580000003 HC RX 258: Performed by: SURGERY

## 2020-10-27 PROCEDURE — 6370000000 HC RX 637 (ALT 250 FOR IP): Performed by: SURGERY

## 2020-10-27 PROCEDURE — 84100 ASSAY OF PHOSPHORUS: CPT

## 2020-10-27 PROCEDURE — 80048 BASIC METABOLIC PNL TOTAL CA: CPT

## 2020-10-27 PROCEDURE — 6360000002 HC RX W HCPCS: Performed by: SURGERY

## 2020-10-27 PROCEDURE — 36415 COLL VENOUS BLD VENIPUNCTURE: CPT

## 2020-10-27 PROCEDURE — 94761 N-INVAS EAR/PLS OXIMETRY MLT: CPT

## 2020-10-27 RX ADMIN — CEFAZOLIN SODIUM 2 G: 10 INJECTION, POWDER, FOR SOLUTION INTRAVENOUS at 02:13

## 2020-10-27 RX ADMIN — ASPIRIN 325 MG: 325 TABLET, COATED ORAL at 08:21

## 2020-10-27 RX ADMIN — METOPROLOL SUCCINATE 25 MG: 25 TABLET, EXTENDED RELEASE ORAL at 08:21

## 2020-10-27 RX ADMIN — Medication 10 ML: at 08:22

## 2020-10-27 RX ADMIN — LISINOPRIL 5 MG: 5 TABLET ORAL at 08:21

## 2020-10-27 RX ADMIN — PANTOPRAZOLE SODIUM 40 MG: 40 TABLET, DELAYED RELEASE ORAL at 06:02

## 2020-10-27 ASSESSMENT — ENCOUNTER SYMPTOMS
SORE THROAT: 0
EYES NEGATIVE: 1
GASTROINTESTINAL NEGATIVE: 1
RHINORRHEA: 0
TROUBLE SWALLOWING: 1
SINUS PAIN: 0
FACIAL SWELLING: 0
RESPIRATORY NEGATIVE: 1
ALLERGIC/IMMUNOLOGIC NEGATIVE: 1
SINUS PRESSURE: 0
VOICE CHANGE: 0

## 2020-10-27 ASSESSMENT — PAIN SCALES - GENERAL
PAINLEVEL_OUTOF10: 0

## 2020-10-27 NOTE — PROGRESS NOTES
VASCULAR SURGERY  PROGRESS NOTE  POST-OP CAROTID ENDARTERECTOMY    10/27/2020  11:28 AM     Christin Rod    1950   [id number]        SUBJECTIVE:  Patient awake and alert. No complaints. Good pain control. Denies nausea. OBJECTIVE    Physical  VITALS:  BP (!) 121/58   Pulse 72   Temp 97.5 °F (36.4 °C) (Temporal)   Resp 16   Ht 5' 10.5\" (1.791 m)   Wt 199 lb (90.3 kg)   SpO2 97%   BMI 28.15 kg/m²     CONSTITUTIONAL:  awake, alert, cooperative, no apparent distress and appears stated age  NECK: Incision clean and dry with no unexpected swelling. NEUROLOGIC:  Mental status unchanged. Motor and sensory function intact. Tongue midline. Data  Hemoglobin   Date/Time Value Ref Range Status   10/27/2020 04:39 AM 11.1 (L) 13.0 - 17.0 g/dL Final     Hematocrit   Date/Time Value Ref Range Status   10/27/2020 04:39 AM 35.5 (L) 40.7 - 50.3 % Final     Sodium   Date/Time Value Ref Range Status   10/27/2020 04:39  135 - 144 mmol/L Final     Potassium   Date/Time Value Ref Range Status   10/27/2020 04:39 AM 4.2 3.7 - 5.3 mmol/L Final     Chloride   Date/Time Value Ref Range Status   10/27/2020 04:39  98 - 107 mmol/L Final     CO2   Date/Time Value Ref Range Status   10/27/2020 04:39 AM 25 20 - 31 mmol/L Final     BUN   Date/Time Value Ref Range Status   10/27/2020 04:39 AM 14 8 - 23 mg/dL Final       ASSESSMENT AND PLAN    72 y.o. male doing well status post Redo Left Carotid Endarterectomy       Plan home today. Follow-up in Office. Continue aspirin. Call for problems.     Electronically signed by Dorys Landis MD on 10/27/2020 at 11:28 AM

## 2020-10-27 NOTE — CONSULTS
this time. We will monitor labs and be available for medical needs of the patient throughout his hospital stay. Past Medical History:     Past Medical History:   Diagnosis Date    Arthritis     Spine, left wrist    CAD (coronary artery disease)     heart attack x 2 with stent    COPD (chronic obstructive pulmonary disease) (HCC)     History of heart artery stent     x 1     Hyperlipidemia     Lymphoma (HCC)     Right side of neck, no chemo or radiation    MI (myocardial infarction) ([facility]     approx. 1992 per pt.  Wears dentures         Past Surgical History:     Past Surgical History:   Procedure Laterality Date    ARTHROTOMY      right foot big toe then removed    CAROTID ENDARTERECTOMY Left 4/16/2014    CAROTID ENDARTERECTOMY Left 10/26/2020    with Lymph node biopsy fro left neck    COLONOSCOPY      Polypectomy per pt.  CORONARY ANGIOPLASTY WITH STENT PLACEMENT      stent x1    EYE SURGERY Bilateral     Foreign body removal    FINGER AMPUTATION Left     ring finger    HYDROCELE EXCISION Left     LUMBAR DISC SURGERY      L5-S1    LYMPH NODE DISSECTION      right side of neck    RECTAL SURGERY      fissure        Medications Prior to Admission:     Prior to Admission medications    Medication Sig Start Date End Date Taking? Authorizing Provider   aspirin 325 MG EC tablet Take 1 tablet by mouth daily. 4/17/14  Yes Tatyana Vora MD   lisinopril (PRINIVIL;ZESTRIL) 5 MG tablet Take 5 mg by mouth daily. Yes Historical Provider, MD   simvastatin (ZOCOR) 40 MG tablet Take 40 mg by mouth nightly    Yes Historical Provider, MD   Omeprazole 20 MG TBEC Take 20 tablets by mouth daily. Yes Historical Provider, MD   metoprolol (TOPROL-XL) 25 MG XL tablet Take 25 mg by mouth daily. Yes Historical Provider, MD        Allergies:     Patient has no known allergies. Social History:     Tobacco:    reports that he quit smoking about 7 months ago. His smoking use included cigarettes.  He has a 12.00 pack-year smoking history. He has never used smokeless tobacco.  Alcohol:      reports current alcohol use of about 21.0 standard drinks of alcohol per week. Drug Use:  reports no history of drug use. Family History:     Family History   Problem Relation Age of Onset    Stroke Father         two strokes  at 69    Other Mother          at 80 from unknown causes    Other Brother         alcoholism       Review of Systems:     Positive and Negative as described in HPI. Review of Systems   Constitutional: Negative. HENT: Positive for trouble swallowing. Negative for congestion, dental problem, drooling, ear discharge, ear pain, facial swelling, hearing loss, mouth sores, nosebleeds, postnasal drip, rhinorrhea, sinus pressure, sinus pain, sneezing, sore throat, tinnitus and voice change. Eyes: Negative. Respiratory: Negative. Cardiovascular: Negative. Gastrointestinal: Negative. Endocrine: Negative. Genitourinary: Negative. Musculoskeletal: Negative. Skin: Negative. Allergic/Immunologic: Negative. Neurological: Negative. Hematological: Negative. Psychiatric/Behavioral: Negative. Physical Exam:     BP (!) 118/54   Pulse 71   Temp 97.9 °F (36.6 °C) (Temporal)   Resp 13   Ht 5' 10.5\" (1.791 m)   Wt 199 lb (90.3 kg)   SpO2 97%   BMI 28.15 kg/m²   Temp (24hrs), Av.7 °F (36.5 °C), Min:96.3 °F (35.7 °C), Max:100.2 °F (37.9 °C)    No results for input(s): POCGLU in the last 72 hours. Intake/Output Summary (Last 24 hours) at 10/27/2020 0330  Last data filed at 10/27/2020 0249  Gross per 24 hour   Intake 3400 ml   Output 825 ml   Net 2575 ml       Physical Exam  Vitals signs and nursing note reviewed. Constitutional:       General: He is not in acute distress. Appearance: Normal appearance. He is normal weight. He is not ill-appearing, toxic-appearing or diaphoretic. HENT:      Head: Normocephalic and atraumatic.       Right Ear: External ear normal.      Left Ear: External ear normal.      Nose: Nose normal. No congestion or rhinorrhea. Mouth/Throat:      Mouth: Mucous membranes are moist.   Eyes:      General: No scleral icterus. Right eye: No discharge. Left eye: No discharge. Extraocular Movements: Extraocular movements intact. Conjunctiva/sclera: Conjunctivae normal.      Pupils: Pupils are equal, round, and reactive to light. Neck:      Musculoskeletal: Normal range of motion and neck supple. No neck rigidity or muscular tenderness. Vascular: Carotid bruit present. Cardiovascular:      Rate and Rhythm: Normal rate and regular rhythm. Pulses: Normal pulses. Heart sounds: Murmur present. No friction rub. No gallop. Pulmonary:      Effort: Pulmonary effort is normal. No respiratory distress. Breath sounds: Normal breath sounds. No stridor. No wheezing, rhonchi or rales. Chest:      Chest wall: No tenderness. Abdominal:      General: Bowel sounds are normal. There is no distension. Palpations: Abdomen is soft. There is no mass. Tenderness: There is no abdominal tenderness. There is no guarding or rebound. Hernia: No hernia is present. Musculoskeletal:         General: No swelling, tenderness, deformity or signs of injury. Right lower leg: No edema. Left lower leg: No edema. Comments: Left fourth finger amputation   Lymphadenopathy:      Cervical: Cervical adenopathy present. Skin:     General: Skin is warm and dry. Capillary Refill: Capillary refill takes less than 2 seconds. Coloration: Skin is not jaundiced or pale. Findings: No bruising, erythema, lesion or rash. Comments: Surgical dressing left neck   Neurological:      General: No focal deficit present. Mental Status: He is alert and oriented to person, place, and time. Sensory: No sensory deficit. Motor: No weakness.       Coordination: Coordination normal. Psychiatric:         Mood and Affect: Mood normal.         Behavior: Behavior normal.         Investigations:      Laboratory Testing:  Recent Results (from the past 24 hour(s))   CBC    Collection Time: 10/26/20 11:17 AM   Result Value Ref Range    WBC 16.9 (H) 3.5 - 11.3 k/uL    RBC 4.49 4.21 - 5.77 m/uL    Hemoglobin 13.9 13.0 - 17.0 g/dL    Hematocrit 43.9 40.7 - 50.3 %    MCV 97.8 82.6 - 102.9 fL    MCH 31.0 25.2 - 33.5 pg    MCHC 31.7 28.4 - 34.8 g/dL    RDW 12.1 11.8 - 14.4 %    Platelets 96 (L) 381 - 453 k/uL    MPV 10.9 8.1 - 13.5 fL    NRBC Automated 0.0 0.0 per 100 WBC       Imaging/Diagonstics:  No results found. Assessment :      Hospital Problems           Last Modified POA    * (Principal) Recurrent carotid stenosis, left 10/26/2020 Yes    Essential hypertension 10/26/2020 Yes    Coronary artery disease involving native coronary artery of native heart without angina pectoris 10/27/2020 Yes    Thrombocytopenia ([address] 10/26/2020 Yes    Hyperlipidemia 10/26/2020 Yes          Plan:     1. Restart home medications per primary  2. Postoperative antibiotics per primary  3. Postoperative pain management per primary  4. IV fluids 125 mL/h  5. Labs of BMP, CBC, magnesium, phosphorus  6. Replete electrolytes as needed  7. Liquid diet and advance as tolerated when agreeable with primary  8. Increase activity   9. Neurovascular checks every 4 hours  10. Continuous telemetry monitoring  11.  Plan to discharge when agreeable with primary         Consultations:   [address] [facility], LIZETH - CNP  10/27/2020  3:30 AM    Copy sent to Dr. Vj Daily, APRN - CNP

## 2020-10-27 NOTE — PROGRESS NOTES
Patient educated on post op discharge instruction and follow up. No questions noted at this time.  Patient is waiting on transportation at this time

## 2020-10-27 NOTE — FLOWSHEET NOTE
Patient is awake and alert while reclining. Patient's daughter is visiting bedside. Patient engages in conversation but denies any spiritual or emotional concerns. Patient appears to be coping adequately and has family support. Patient seems to accept the visit but states that he doesn't think that he will need spiritual care. Spiritual Care will follow as needed.        10/26/20 1945   Encounter Summary   Services provided to: Patient and family together   Referral/Consult From: [address] Visiting   (10/26/20)   Complexity of Encounter Low   Length of Encounter 15 minutes   Routine   Type Initial   Assessment Approachable   Intervention Active listening;Nurtured hope   Outcome Acceptance

## 2020-10-27 NOTE — PROGRESS NOTES
[facility]  Office: [phone]  Bradford More, DO, Yen Bermudez, DO, Gonzalo Quinn, DO, Renetta Brothers, DO, Ok Teresa MD, Shyla Gonzáles MD, Darien Sargent MD, Taya Stephen MD, Naa Worrell MD, Star Mackey MD, Kinjal Pritchett MD, Delta Lanza MD, Damaris Owens MD, Drake Mendez DO, Jules Duarte MD, Luke Valladares MD, Jailene Balbuena DO, Douglas Moreno MD,  Janice Albright DO, Melba Martinez MD, Cliff Leslie MD, Johanna Walters, [facility], [facility], CNP, Ugo Mcneal, CNP, Ani Guillory, CNS, Rozina Marquez, CNP, Nenita Bradshaw, CNP, Emile Bond, CNP, José Murcia, CNP, Theodore Gilliland, CNP, Mellissa Quiñones PA-C, Eryn Mchugh, [facility], Tiffanie Fonseca, CNP, Gena Youssef, CNP, Herman Winkler, CNP, Pastor Verma, [facility], Janice Graves, [facility]    Progress Note    10/27/2020    9:06 AM    Name:   Katherine Finney  MRN:     [id number]     Acct:      [de-identified]   Room:   2028/2028-01   Day:  1  Admit Date:  10/26/2020 10:04 AM    PCP:   LIZETH Argueta CNP  Code Status:  Full Code    Subjective:     C/C: S/p redo left CEA    Interval History Status:   Denies any chest pain/palpitation/shortness of breath  Doing well postoperatively  Vascular plan to discharge him home today    Brief History:   Patient presented today for a redo left carotid endarterectomy with left cervical lymph node biopsy with Dr. Severa Allegra for severe recurrent left carotid stenosis. He has a history of hypertension, thrombocytopenia, hyperlipidemia, and CAD. He has also recently quit smoking. Internal medicine was consulted for medical management of the patient in the postoperative period. Postoperatively he is doing well and complains of some difficulty swallowing, but is tolerating liquids at this time. He denies any questions or concerns at this time.   We will monitor labs and be available for medical needs of the patient throughout his hospital stay.         Review of Systems:     Constitutional:  negative for chills, fevers, sweats  Respiratory:  negative for cough, dyspnea on exertion, shortness of breath, wheezing  Cardiovascular:  negative for chest pain, chest pressure/discomfort, lower extremity edema, palpitations  Gastrointestinal:  negative for abdominal pain, constipation, diarrhea, nausea, vomiting  Neurological:  negative for dizziness, headache    Medications: Allergies:  No Known Allergies    Current Meds:   Scheduled Meds:    lisinopril  5 mg Oral Daily    pantoprazole  40 mg Oral QAM AC    metoprolol succinate  25 mg Oral Daily    sodium chloride flush  10 mL Intravenous 2 times per day    aspirin  325 mg Oral Daily    atorvastatin  20 mg Oral Nightly     Continuous Infusions:    lactated ringers 125 mL/hr at 10/26/20 2238     PRN Meds: sodium chloride flush, acetaminophen, HYDROcodone 5 mg - acetaminophen **OR** HYDROcodone 5 mg - acetaminophen, morphine **OR** morphine, fentanNYL    Data:     Past Medical History:   has a past medical history of Arthritis, CAD (coronary artery disease), COPD (chronic obstructive pulmonary disease) ([facility] History of heart artery stent, Hyperlipidemia, [facility] infarction) ([facility] and Wears dentures. Social History:   reports that he quit smoking about 7 months ago. His smoking use included cigarettes. He has a 12.00 pack-year smoking history. He has never used smokeless tobacco. He reports current alcohol use of about 21.0 standard drinks of alcohol per week. He reports that he does not use drugs.      Family History:   Family History   Problem Relation Age of Onset    Stroke Father         two strokes  at 69    Other Mother          at 80 from unknown causes    Other Brother         alcoholism       Vitals:  BP (!) 121/58   Pulse 72   Temp 97.5 °F (36.4 °C) (Temporal)   Resp 16   Ht 5' 10.5\" (1.791 m)   Wt 199 lb (90.3 kg)   SpO2 97%   BMI 28.15 kg/m²   Temp (24hrs), Av.7 °F (36.5 °C), Min:96.3 °F (35.7 °C), Max:100.2 °F (37.9 °C)    No results for input(s): POCGLU in the last 72 hours. I/O (24Hr): Intake/Output Summary (Last 24 hours) at 10/27/2020 0906  Last data filed at 10/27/2020 0700  Gross per 24 hour   Intake 3400 ml   Output 1625 ml   Net 1775 ml       Labs:  Hematology:  Recent Labs     10/26/20  1117 10/27/20  0439   WBC 16.9* 20.2*   RBC 4.49 3.63*   HGB 13.9 11.1*   HCT 43.9 35.5*   MCV 97.8 97.8   MCH 31.0 30.6   MCHC 31.7 31.3   RDW 12.1 12.3   PLT 96* 112*   MPV 10.9 11.2     Chemistry:  Recent Labs     10/27/20  0439      K 4.2      CO2 25   GLUCOSE 166*   BUN 14   CREATININE 0.88   MG 1.7   ANIONGAP 8*   LABGLOM >60   GFRAA >60   CALCIUM 9.1   PHOS 3.5   No results for input(s): PROT, LABALBU, LABA1C, T8KYEDJ, Q7GLHRT, FT4, TSH, AST, ALT, LDH, GGT, ALKPHOS, LABGGT, BILITOT, BILIDIR, AMMONIA, AMYLASE, LIPASE, LACTATE, CHOL, HDL, LDLCHOLESTEROL, CHOLHDLRATIO, TRIG, VLDL, TLF10VL, PHENYTOIN, PHENYF, URICACID, POCGLU in the last 72 hours. ABG:  Lab Results   Component Value Date    POCPH 7.43 2020    POCPCO2 31 2020    POCPO2 55 2020    POCHCO3 20.3 2020    NBEA 4 2020    PBEA NOT REPORTED 2020    MIJ7XVL 21 2020    ODSR3NAS 89 2020    FIO2 50.0 2020     Lab Results   Component Value Date/Time    SPECIAL NOT REPORTED 2020 04:29 AM     Lab Results   Component Value Date/Time    CULTURE NORMAL RESPIRATORY LUC MODERATE GROWTH 2020 04:29 AM       Radiology:  No results found.     Physical Examination:        General appearance:  alert, cooperative and no distress  Mental Status:  oriented to person, place and time and normal affect  Neck: + Surgical wound after left CEA  Lungs:  clear to auscultation bilaterally, normal effort  Heart:  regular rate and rhythm, no murmur  Abdomen:  soft, nontender, nondistended, normal bowel sounds, no masses, hepatomegaly, splenomegaly  Extremities:  no edema, redness, tenderness in the calves  Skin:  no gross lesions, rashes, induration    Assessment:        Hospital Problems           Last Modified POA    * (Principal) Recurrent carotid stenosis, left 10/26/2020 Yes    Essential hypertension 10/26/2020 Yes    Coronary artery disease involving native coronary artery of native heart without angina pectoris 10/27/2020 Yes    Thrombocytopenia ([address] 10/26/2020 Yes    Hyperlipidemia 10/26/2020 Yes          Plan:        1. Continue home medications  2. Postoperative pain medicines as per vascular plan  3.  Okay to discharge from medical standpoint    Beata Morocho MD  10/27/2020  9:06 AM

## 2020-10-27 NOTE — DISCHARGE SUMMARY
VASCULAR SURGERY   DISCHARGE SUMMARY      Patient Identification  Sharon Witt is a 72 y.o. male. :  1950  Admit Date:  10/26/2020    Discharge date:   No discharge date for patient encounter. Disposition: home    Discharge Diagnoses:   Patient Active Problem List   Diagnosis    Essential hypertension    Carotid artery stenosis    GERD (gastroesophageal reflux disease)    Coronary artery disease involving native coronary artery of native heart without angina pectoris    Tobacco abuse    Back pain    Pneumonia of right upper lobe due to infectious organism    Acute hypoxemic respiratory failure (HCC)    Normocytic anemia    Mediastinal lymphadenopathy    Hyperglycemia    Thrombocytopenia (HCC)    Human metapneumovirus (hMPV) pneumonia    Suspected chronic obstructive pulmonary disease based on initial evaluation ([facility] [address]    Hyperlipidemia    Recurrent carotid stenosis, left         Consults: IM    Surgery: Redo left carotid endarterectomy    Patient Instructions: Activity: no heavy lifting, pushing, pulling for 6 weeks, no driving for 2 weeks or while on analgesics  Diet: As tolerated  Follow-up with Crystal Santiago MD in 3-4 weeks. See pre-printed instructions in chart and given to patient upon discharge. Discharge Medications:      [place] Person   Home Medication Instructions [id number]    Printed on:10/27/20 1129   Medication Information                      aspirin 325 MG EC tablet  Take 1 tablet by mouth daily. lisinopril (PRINIVIL;ZESTRIL) 5 MG tablet  Take 5 mg by mouth daily. metoprolol (TOPROL-XL) 25 MG XL tablet  Take 25 mg by mouth daily. Omeprazole 20 MG TBEC  Take 20 tablets by mouth daily.              simvastatin (ZOCOR) 40 MG tablet  Take 40 mg by mouth nightly                   HPI and Hospital Course: 80-year-old male underwent a redo left carotid endarterectomy without

## 2020-10-27 NOTE — OP NOTE
[id number] [facility] [address]                [address] [facility], [address] [place] [address]                                OPERATIVE REPORT    PATIENT NAME: Thuy Alamo                   :        1950  MED REC NO:   [id number]                             ROOM:         ACCOUNT NO:   [de-identified]                           ADMIT DATE: 10/26/2020  PROVIDER:     Micheal Rodriguez MD    DATE OF PROCEDURE:  10/26/2020    PREOPERATIVE DIAGNOSES:  Recurrent 85% left distal common and internal  carotid artery stenosis. POSTOPERATIVE DIAGNOSES:  Recurrent 85% left distal common and internal  carotid artery stenosis. Left cervical lymphadenopathy. PROCEDURES PERFORMED:  1. Redo left carotid endarterectomy. 2.  Left cervical lymph node biopsy. SURGEON:  Janessa Brooks. Faustino Vasquez MD    ANESTHESIA:  General endotracheal.    ESTIMATED BLOOD LOSS:  150 mL. COMPLICATIONS:  None. OPERATIVE INDICATIONS:  The patient is a 76-year-old male who presents  with recurrent left carotid stenosis. Previously underwent a left  carotid endarterectomy in . He continues to smoke, recently quit in  2020. He now presents with at least 85% recurrent stenosis in the  left internal carotid artery as well as 80%-85% stenosis in the right  internal carotid artery. At this time, he is being taken to the  operating room for an elective redo left carotid endarterectomy. Intraoperatively, he was noted to have large lymph nodes present and  cervical lymph node biopsy was performed. OPERATIVE TECHNIQUE:  After informed consent had been obtained, the  patient was brought to the operating room, and placed in the supine  position, and general endotracheal anesthesia was induced. The left  neck was then prepped and draped in the usual sterile fashion. An  oblique incision was then made in the left neck and the subcutaneous  tissues were sharply taken down.   Bleeding points being controlled with  electrocautery. There was extensive scar tissue present and very large  cervical lymph nodes were present overlying the bifurcation. Large lymph  nodes were excised and sent to pathology. Stumps were tied with 3-0  Vicryl. Next, carotid bifurcation was isolated and the jugular vein was  noted to be plastered to the carotid as well as the vagus nerve. The  internal jugular vein was carefully dissected free and small bleeding  points were noted and this was repaired with a figure-of-eight 5-0  Prolene suture. The vagus nerve was then carefully mobilized from the  carotid bifurcation. Dissection was carried high up under the jaw into  the internal carotid and more proximally onto the mid common carotid. At this point, the patient was intravenously heparinized with 7000 units  of aqueous heparin. The external carotid artery was then clamped with a  profunda clamp over the internal carotid with a baby J-clamp. Common  carotid was clamped with a White clamp. A longitudinal arteriotomy was  then made on the previous patch site and extended proximally and  distally. Thrombus was present within the carotid bifurcation as well  as areas of high-grade stenosis involving the distal common and proximal  internal carotid, mostly due to recurrent plaque formation along with  some degree of hyperplasia. The vessels were then irrigated with  heparinized saline and the bifurcation was endarterectomized in the  standard fashion with distal taper points of both the internal and  external carotid arteries. Proximally, the plaque was tapered down to  the common carotid and removed. The endarterectomized surface was  irrigated with heparinized saline and small debris fragments were  removed. Vessels were allowed to backbleed and the common carotid  flushed antegrade. Due to the site being quite large due to the patch,  we elected not to re-patch the site.   The arteriotomy was closed  primarily using running 6-0 Prolene suture. Prior to completing the  closure, the vessels were allowed to backbleed and common carotid  flushed antegrade. Vessels were irrigated with heparinized saline and  closure completed. At this time, the flow was reestablished first to  the external carotid followed by the internal carotid artery. Thrombin  and Gelfoam were placed at the closure site. Doppler was used to  interrogate both the internal and external carotid arteries and  excellent phasic flow was noted. A 15 mg of protamine was given  intravenously in order to reverse the remaining heparin. The wound was  then irrigated with bacitracin solution and the Gelfoam removed. Several small bleeding points were electrocauterized and tied with 3-0  Vicryl. Once the hemostasis had been achieved, the platysma was  reapproximated using running 3-0 Vicryl suture followed by closure of  the skin with interrupted 4-0 Monocryl subcuticular suture. Dermabond  was applied followed by sterile Kerlix dressing. The patient tolerated  the procedure well and was awakened in the operating room, moving all  four extremities with his tongue in the midline. At this time, he was  transferred to the recovery room in satisfactory condition. Sponge,  instrument, and needle counts were correct at the conclusion of the  operation.         Hilda Scott MD    D: 10/26/2020 16:01:20       T: 10/27/2020 0:26:09     [email or web address]  Job#: [id number]     Doc#: [id number]    CC:  Shalini Lord MD       Family Physician

## 2020-10-28 ENCOUNTER — TELEPHONE (OUTPATIENT)
Dept: FAMILY MEDICINE CLINIC | Age: 70
End: 2020-10-28

## 2020-10-28 NOTE — TELEPHONE ENCOUNTER
[facility] Transitions Initial Follow Up Call    Outreach made within 2 business days of discharge: Yes    Patient: Nadya Paul Patient : 1950   MRN: [id number]  Reason for Admission: There are no discharge diagnoses documented for the most recent discharge.   Discharge Date: 10/27/20       Spoke with: patient has a follow up jill with Dr. Janann Babinski    Discharge department/facility: [place] [address]. Interactive Patient Contact:      Scheduled appointment with PCP within 7-14 days    Follow Up  Future Appointments   Date Time Provider Aviva Gordillo   2020  1:30 PM LIZETH Calvert - [facility] [address] [place] [address]

## 2020-10-29 LAB — SURGICAL PATHOLOGY REPORT: NORMAL

## 2020-11-13 ENCOUNTER — TELEPHONE (OUTPATIENT)
Dept: FAMILY MEDICINE CLINIC | Age: 70
End: 2020-11-13

## 2021-01-11 ENCOUNTER — OFFICE VISIT (OUTPATIENT)
Dept: FAMILY MEDICINE CLINIC | Age: 71
End: 2021-01-11
Payer: MEDICARE

## 2021-01-11 VITALS
TEMPERATURE: 96.4 F | SYSTOLIC BLOOD PRESSURE: 152 MMHG | OXYGEN SATURATION: 97 % | DIASTOLIC BLOOD PRESSURE: 82 MMHG | HEART RATE: 62 BPM | BODY MASS INDEX: 29.06 KG/M2 | WEIGHT: 205.4 LBS

## 2021-01-11 DIAGNOSIS — I10 ESSENTIAL HYPERTENSION: Primary | ICD-10-CM

## 2021-01-11 DIAGNOSIS — H02.402 DROOPING EYELID, LEFT: ICD-10-CM

## 2021-01-11 DIAGNOSIS — I65.23 BILATERAL CAROTID ARTERY STENOSIS: ICD-10-CM

## 2021-01-11 PROCEDURE — 3017F COLORECTAL CA SCREEN DOC REV: CPT | Performed by: NURSE PRACTITIONER

## 2021-01-11 PROCEDURE — G8427 DOCREV CUR MEDS BY ELIG CLIN: HCPCS | Performed by: NURSE PRACTITIONER

## 2021-01-11 PROCEDURE — G8484 FLU IMMUNIZE NO ADMIN: HCPCS | Performed by: NURSE PRACTITIONER

## 2021-01-11 PROCEDURE — 4040F PNEUMOC VAC/ADMIN/RCVD: CPT | Performed by: NURSE PRACTITIONER

## 2021-01-11 PROCEDURE — 1036F TOBACCO NON-USER: CPT | Performed by: NURSE PRACTITIONER

## 2021-01-11 PROCEDURE — G8417 CALC BMI ABV UP PARAM F/U: HCPCS | Performed by: NURSE PRACTITIONER

## 2021-01-11 PROCEDURE — 99214 OFFICE O/P EST MOD 30 MIN: CPT | Performed by: NURSE PRACTITIONER

## 2021-01-11 PROCEDURE — 1123F ACP DISCUSS/DSCN MKR DOCD: CPT | Performed by: NURSE PRACTITIONER

## 2021-01-11 RX ORDER — LISINOPRIL 10 MG/1
10 TABLET ORAL DAILY
Qty: 90 TABLET | Refills: 1 | Status: SHIPPED | OUTPATIENT
Start: 2021-01-11 | End: 2021-05-20

## 2021-01-11 ASSESSMENT — PATIENT HEALTH QUESTIONNAIRE - PHQ9
SUM OF ALL RESPONSES TO PHQ QUESTIONS 1-9: 0
SUM OF ALL RESPONSES TO PHQ9 QUESTIONS 1 & 2: 0
2. FEELING DOWN, DEPRESSED OR HOPELESS: 0

## 2021-01-11 NOTE — PROGRESS NOTES
Hira Esteban, SILAS-C  [address]  [id number] [facility].  [facility], [facility]  [phone]  [phone]    Arslan Abrams is a 72 y.o. male who is here with c/o of:    Chief Complaint: Hypertension (6 month follow up)      Patient Accompanied by: patient and daughter    HPI - Arslan Abrams is here today for f/u of chronic conditions    HTN   - he is compliant with low sodium diet on most days and is physically active   - he denies h/a, c/p, palpitations, sob, cough, lower extremity edema   - current  Medications: lisinopril 5mg daily, metoprolol 25mg daily, simvastatin 40mg nightly    Bilateral Carotid stenosis   - s/p left carotid endarterectomy   - scheduled for right in March    Left eye   - Left eye drooping started 2-3 weeks ago   - he has had upper lid drooping, but has noticed that his left eye just closes    - there are no other symptoms at this time - negative STROKE symptoms      Patient Active Problem List:     Essential hypertension     Carotid artery stenosis     GERD (gastroesophageal reflux disease)     Coronary artery disease involving native coronary artery of native heart without angina pectoris     Tobacco abuse     Back pain     Pneumonia of right upper lobe due to infectious organism     Acute hypoxemic respiratory failure (HCC)     Normocytic anemia     Mediastinal lymphadenopathy     Hyperglycemia     Thrombocytopenia (HCC)     Human metapneumovirus (hMPV) pneumonia     Suspected chronic obstructive pulmonary disease based on initial evaluation ([address]     Hyperlipidemia     Recurrent carotid stenosis, left     Past Medical History:   Diagnosis Date    Arthritis     Spine, left wrist    CAD (coronary artery disease)     heart attack x 2 with stent    COPD (chronic obstructive pulmonary disease) ([address]     History of heart artery stent     x 1     Hyperlipidemia     Lymphoma ([address]     Right side of neck, no chemo or radiation    MI (myocardial infarction) ([address] approx.  per pt.  Wears dentures       Past Surgical History:   Procedure Laterality Date    ARTHROTOMY      right foot big toe then removed    CAROTID ENDARTERECTOMY Left 2014    CAROTID ENDARTERECTOMY Left 10/26/2020    with Lymph node biopsy fro left neck    CAROTID ENDARTERECTOMY Left 10/26/2020    REDO LEFT CAROTID ENDARTERECTOMY WITH LEFT CERVICAL LYMPH NODE BIOPSY performed by Mona Hsieh MD at [address] [place] [address] per pt.  CORONARY ANGIOPLASTY WITH STENT PLACEMENT      stent x1    EYE SURGERY Bilateral     Foreign body removal    FINGER AMPUTATION Left     ring finger    HYDROCELE EXCISION Left     LUMBAR DISC SURGERY      L5-S1    LYMPH NODE DISSECTION      right side of neck    RECTAL SURGERY      fissure     Family History   Problem Relation Age of Onset    Stroke Father         two strokes  at 69    Other Mother          at 80 from unknown causes    Other Brother         alcoholism     Social History     Tobacco Use    Smoking status: Former Smoker     Packs/day: 0.50     Years: 24.00     Pack years: 12.00     Types: Cigarettes     Quit date: 3/12/2020     Years since quittin.8    Smokeless tobacco: Never Used   Substance Use Topics    Alcohol use: Yes     Alcohol/week: 21.0 standard drinks     Types: 21 Cans of beer per week     ALLERGIES:  No Known Allergies       Subjective     · Constitutional:  Negative for activity change, appetite change,unexpected weight change, chills, fever, and fatigue. · HENT: Negative for ear pain, sore throat,  Rhinorrhea, sinus pain, sinus pressure, congestion. · Eyes:  Negative for pain and discharge. Positive for left upper lid drooping, involuntary closing of left eye  · Respiratory:  Negative for chest tightness, shortness of breath, wheezing, and cough. · Cardiovascular:  Negative for chest pain, palpitations and leg swelling.    · Gastrointestinal: Negative for abdominal pain, blood in stool, constipation,diarrhea, nausea and vomiting. · Endocrine: Negative for cold intolerance, heat intolerance, polydipsia, polyphagia and polyuria. · Genitourinary: Negative for difficulty urinating, dysuria, flank pain, frequency, hematuria and urgency. · Musculoskeletal: Negative for arthralgias, back pain, joint swelling, myalgias, neck pain and neck stiffness. · Skin: Negative for rash and wound. · Allergic/Immunologic: Negative for environmental allergies and food allergies. · Neurological:  Negative for dizziness, light-headedness, numbness and headaches. · Hematological:  Negative for adenopathy. Does not bruise/bleed easily. · Psychiatric/Behavioral: Negative for self-injury, sleep disturbance and suicidal ideas. Objective     PHYSICAL EXAM:     · Constitutional: Winifred Monson is oriented to person, place, and time. Vital signs are normal. Appears well-developed and well-nourished. · HEENT:   · Head: Normocephalic and atraumatic. Right Ear: Hearing and external ear normal.     · Left Ear: Hearing and external ear normal.    · Nose: Nares normal. Septum midline. No drainage or sinus tenderness. Mucosa pink and moist  · Mouth/Throat: Oropharynx- No erythema, no exudate. Uvula midline, no erythema, no edema. Mucous membranes are pink and moist.   · Eyes:PERRL, EOMI, Conjunctiva normal, No discharge. Left upper eye lid drooping covering 3/4 of eye - vision grossly intact, opens and closes without difficulty  · Neck: Full passive range of motion. Non-tender on palpation. Neck supple. No thyromegaly present. Trachea normal.  · Cardiovascular: Normal rate, regular rhythm, S1, S2, no murmur, no gallop, no friction rub, intact distal pulses. · Pulmonary/Chest: Breath sounds are clear throughout, No respiratory distress, No wheezing, No chest tenderness. Effort normal  · Musculoskeletal: Extremities appear regular and symmetric.  No evident masses, lesions, foreign bodies, or other abnormalities. No edema. No tenderness on palpation. Joints are stable. Full ROM, strength and tone are within normal limits. · Lymphadenopathy: No lymphadenopathy noted. Physical Exam  Eyes:      General: No visual field deficit. Neurological:      General: No focal deficit present. Mental Status: He is alert and oriented to person, place, and time. Cranial Nerves: Cranial nerves are intact. No cranial nerve deficit, dysarthria or facial asymmetry. Sensory: Sensation is intact. No sensory deficit. Motor: Motor function is intact. No weakness, tremor, atrophy, abnormal muscle tone, seizure activity or pronator drift. Coordination: Coordination is intact. Gait: Gait is intact. Deep Tendon Reflexes: Reflexes are normal and symmetric. · Skin: Skin is warm, dry and intact. No obvious lesions on exposed skin  · Psychiatric: Normal mood and affect. Speech is normal and behavior is normal.     Nursing note and vitals reviewed. Blood pressure (!) 152/82, pulse 62, temperature 96.4 °F (35.8 °C), temperature source Temporal, weight 205 lb 6.4 oz (93.2 kg), SpO2 97 %. Body mass index is 29.06 kg/m². Wt Readings from Last 3 Encounters:   01/11/21 205 lb 6.4 oz (93.2 kg)   10/26/20 199 lb (90.3 kg)   10/19/20 200 lb 9.6 oz (91 kg)     BP Readings from Last 3 Encounters:   01/11/21 (!) 152/82   10/27/20 (!) 121/58   10/26/20 133/63       No results found for this visit on 01/11/21. Completed Orders/Prescriptions   Orders Placed This Encounter   Medications    lisinopril (PRINIVIL;ZESTRIL) 10 MG tablet     Sig: Take 1 tablet by mouth daily     Dispense:  90 tablet     Refill:  1               AssessmentPlan/Medical Decision Making     1. Essential hypertension  - controlled  - reviewed DASH diet  - lisinopril (PRINIVIL;ZESTRIL) 10 MG tablet; Take 1 tablet by mouth daily  Dispense: 90 tablet; Refill: 1    2.  Bilateral carotid artery stenosis  - strongly encouraged f/u with cardiovascular surgeon  - spent a great amount of time with patient and his daughter discussing the risks and benefits of having the right carotid artery cleared - I strongly feel that the benefits outweigh the risks at this time and encouraged him to have the procedure    3. Drooping eyelid, left  - f/u with ophthalmology  - I do not feel there is a neurological component to this problem at this time - will continue to monitor closely      Return in about 3 months (around 4/11/2021). 1.  Mehul Browning received counseling on the following healthy behaviors: nutrition, exercise and medication adherence  2. Patient given educational materials - see patient instructions  3. Was a self-tracking handout given in paper form or via [employer]? No  If yes, see orders or list here. 4.  Discussed use, benefit, and side effects of prescribed medications. Barriers to medication compliance addressed. All patient questions answered. Pt voiced understanding. 5.  Reviewed prior labs, imaging, consultation, follow up, and health maintenance  6. Continue current medications, diet and exercise. 7. Discussed use, benefit, and side effects of prescribed medications. Barriers to medication compliance addressed. All her questions were answered. Pt voiced understanding. Mehul Browning will continue current medications, diet and exercise. Patient given educational materials on DASH, low fat diet    Of the 30 minute duration appointment visit, Adam Cary CNP spent at least 50% of the face-to-face time in counseling, explanation of diagnosis, planning of further management, and answering all questions. Signed:  Adam Cary CNP    This note is created with the assistance of a speech-recognition program.  While intending to generate a document that actually reflects the content of the visit, no guarantees can be provided that every mistake has been identified and corrected by editing.

## 2021-02-10 ENCOUNTER — IMMUNIZATION (OUTPATIENT)
Dept: FAMILY MEDICINE CLINIC | Age: 71
End: 2021-02-10
Payer: MEDICARE

## 2021-02-10 ENCOUNTER — TELEPHONE (OUTPATIENT)
Dept: FAMILY MEDICINE CLINIC | Age: 71
End: 2021-02-10

## 2021-02-10 PROCEDURE — 0011A COVID-19, MODERNA VACCINE 100MCG/0.5ML DOSE: CPT | Performed by: INTERNAL MEDICINE

## 2021-02-10 PROCEDURE — 91301 COVID-19, MODERNA VACCINE 100MCG/0.5ML DOSE: CPT | Performed by: INTERNAL MEDICINE

## 2021-03-04 ENCOUNTER — HOSPITAL ENCOUNTER (OUTPATIENT)
Dept: PREADMISSION TESTING | Age: 71
Discharge: HOME | End: 2021-03-08
Payer: MEDICARE

## 2021-03-04 VITALS
OXYGEN SATURATION: 98 % | TEMPERATURE: 98.9 F | WEIGHT: 207.23 LBS | HEIGHT: 70 IN | SYSTOLIC BLOOD PRESSURE: 122 MMHG | RESPIRATION RATE: 16 BRPM | DIASTOLIC BLOOD PRESSURE: 59 MMHG | BODY MASS INDEX: 29.67 KG/M2 | HEART RATE: 53 BPM

## 2021-03-04 LAB
ABO/RH: NORMAL
ANION GAP SERPL CALCULATED.3IONS-SCNC: 12 MMOL/L (ref 9–17)
ANTIBODY SCREEN: NEGATIVE
ARM BAND NUMBER: NORMAL
BILIRUBIN URINE: NEGATIVE
BUN BLDV-MCNC: 17 MG/DL (ref 8–23)
CHLORIDE BLD-SCNC: 105 MMOL/L (ref 98–107)
CO2: 26 MMOL/L (ref 20–31)
COLOR: YELLOW
COMMENT UA: NORMAL
CREAT SERPL-MCNC: 0.97 MG/DL (ref 0.7–1.2)
EXPIRATION DATE: NORMAL
GFR AFRICAN AMERICAN: >60 ML/MIN
GFR NON-AFRICAN AMERICAN: >60 ML/MIN
GFR SERPL CREATININE-BSD FRML MDRD: NORMAL ML/MIN/{1.73_M2}
GFR SERPL CREATININE-BSD FRML MDRD: NORMAL ML/MIN/{1.73_M2}
GLUCOSE URINE: NEGATIVE
HCT VFR BLD CALC: 43.6 % (ref 40.7–50.3)
HEMOGLOBIN: 13.4 G/DL (ref 13–17)
INR BLD: 1
KETONES, URINE: NEGATIVE
LEUKOCYTE ESTERASE, URINE: NEGATIVE
MCH RBC QN AUTO: 30 PG (ref 25.2–33.5)
MCHC RBC AUTO-ENTMCNC: 30.7 G/DL (ref 28.4–34.8)
MCV RBC AUTO: 97.5 FL (ref 82.6–102.9)
NITRITE, URINE: NEGATIVE
NRBC AUTOMATED: 0 PER 100 WBC
PARTIAL THROMBOPLASTIN TIME: 26.7 SEC (ref 23.9–33.8)
PDW BLD-RTO: 12.5 % (ref 11.8–14.4)
PH UA: 5.5 (ref 5–8)
PLATELET # BLD: 110 K/UL (ref 138–453)
PMV BLD AUTO: 11 FL (ref 8.1–13.5)
POTASSIUM SERPL-SCNC: 4.5 MMOL/L (ref 3.7–5.3)
PROTEIN UA: NEGATIVE
PROTHROMBIN TIME: 12.8 SEC (ref 11.5–14.2)
RBC # BLD: 4.47 M/UL (ref 4.21–5.77)
SODIUM BLD-SCNC: 143 MMOL/L (ref 135–144)
SPECIFIC GRAVITY UA: 1.02 (ref 1–1.03)
TURBIDITY: CLEAR
URINE HGB: NEGATIVE
UROBILINOGEN, URINE: NORMAL
WBC # BLD: 22.2 K/UL (ref 3.5–11.3)

## 2021-03-04 PROCEDURE — 86900 BLOOD TYPING SEROLOGIC ABO: CPT

## 2021-03-04 PROCEDURE — 85027 COMPLETE CBC AUTOMATED: CPT

## 2021-03-04 PROCEDURE — 85730 THROMBOPLASTIN TIME PARTIAL: CPT

## 2021-03-04 PROCEDURE — 86850 RBC ANTIBODY SCREEN: CPT

## 2021-03-04 PROCEDURE — 93005 ELECTROCARDIOGRAM TRACING: CPT | Performed by: ANESTHESIOLOGY

## 2021-03-04 PROCEDURE — 84520 ASSAY OF UREA NITROGEN: CPT

## 2021-03-04 PROCEDURE — 85610 PROTHROMBIN TIME: CPT

## 2021-03-04 PROCEDURE — 82565 ASSAY OF CREATININE: CPT

## 2021-03-04 PROCEDURE — 86901 BLOOD TYPING SEROLOGIC RH(D): CPT

## 2021-03-04 PROCEDURE — 81003 URINALYSIS AUTO W/O SCOPE: CPT

## 2021-03-04 PROCEDURE — 80051 ELECTROLYTE PANEL: CPT

## 2021-03-04 PROCEDURE — 36415 COLL VENOUS BLD VENIPUNCTURE: CPT

## 2021-03-04 NOTE — PRE-PROCEDURE INSTRUCTIONS
[facility] Wednesday, [facility] at [facility] AM    Call [phone] when you arrive to the hospital.  No visitors in surgery area at this time. Continue to take your home medications as you normally do up to and including the night before surgery with the exception of any blood thinning medications. Please stop any blood thinning medications as directed by your surgeon or prescribing physician. Failure to stop certain medications may interfere with your scheduled surgery. These may include:  Aspirin, Warfarin (Coumadin), Clopidogrel (Plavix), Ibuprofen (Motrin, Advil), Naproxen (Aleve), Meloxicam (Mobic), Celecoxib (Celebrex), Eliquis, Pradaxa, Xarelto, Effient, Fish Oil, Herbal supplements. Please take the following medication(s) the day of surgery with a small sip of water:  Omeprazole and Metoprolol        PREPARING FOR YOUR SURGERY:     Before surgery, you can play an important role in your own health. Because skin is not sterile, we need to be sure that your skin is as free of germs as possible before surgery by carefully washing before surgery. Preparing or prepping skin before surgery can reduce the risk of a surgical site infection.   Do not shave the area of your body where your surgery will be performed unless you received specific permission from your physician. You will need to shower at home the night before surgery and the morning of surgery with a special soap called chlorhexidine gluconate (CHG*). *Not to be used by people allergic to Chlorhexidine Gluconate (CHG). Following these instructions will help you be sure that your skin is clean before surgery. Instructions on cleaning your skin before surgery: The night before your surgery:      You will need to shower with warm water (not hot) and the CHG soap.  Use a clean wash cloth and a clean towel. Have clean clothes available to put on after the shower.        First wash your hair with of necessary personal items.  Please wear loose, comfortable clothing. If you are potentially going to have a cast or brace bring clothing that will fit over them.  In case of illness - If you have cold or flu like symptoms (high fever, runny nose, sore throat, cough, etc.) rash, nausea, vomiting, loose stools, and/or recent contact with someone who has a contagious disease (chicken pox, measles, etc.) Please call your doctor before coming to the hospital.         Day of Surgery/Procedure:    As a patient at [facility] - INPATIENT you can expect quality medical and nursing care that is centered on your individual needs. Our goal is to make your surgical experience as comfortable as possible    . Transportation After Your Surgery/Procedure: You will need a friend or family member to drive you home after your procedure. Your  must be 25years of age or older and able to sign off on your discharge instructions. A taxi cab or any other form of public transportation is not acceptable. Someone must remain with you for the first 24 hours after your surgery if you receive anesthesia or medication. If you do not have someone to stay with you, your procedure may be cancelled.       If you have any other questions regarding your procedure or the day of surgery, please call [phone]      _________________________  ____________________________  Signature (Patient)              Signature (Provider) & date

## 2021-03-05 ENCOUNTER — HOSPITAL ENCOUNTER (OUTPATIENT)
Dept: LAB | Age: 71
Setting detail: SPECIMEN
Discharge: HOME | End: 2021-03-05
Payer: MEDICARE

## 2021-03-05 ENCOUNTER — ANESTHESIA EVENT (OUTPATIENT)
Dept: OPERATING ROOM | Age: 71
DRG: 038 | End: 2021-03-05
Payer: MEDICARE

## 2021-03-05 DIAGNOSIS — Z01.818 PREOP TESTING: Primary | ICD-10-CM

## 2021-03-05 LAB
EKG ATRIAL RATE: 53 BPM
EKG P AXIS: 55 DEGREES
EKG P-R INTERVAL: 172 MS
EKG Q-T INTERVAL: 434 MS
EKG QRS DURATION: 104 MS
EKG QTC CALCULATION (BAZETT): 407 MS
EKG R AXIS: 40 DEGREES
EKG T AXIS: 174 DEGREES
EKG VENTRICULAR RATE: 53 BPM

## 2021-03-05 PROCEDURE — U0005 INFEC AGEN DETEC AMPLI PROBE: HCPCS

## 2021-03-05 PROCEDURE — U0003 INFECTIOUS AGENT DETECTION BY NUCLEIC ACID (DNA OR RNA); SEVERE ACUTE RESPIRATORY SYNDROME CORONAVIRUS 2 (SARS-COV-2) (CORONAVIRUS DISEASE [COVID-19]), AMPLIFIED PROBE TECHNIQUE, MAKING USE OF HIGH THROUGHPUT TECHNOLOGIES AS DESCRIBED BY CMS-2020-01-R: HCPCS

## 2021-03-06 LAB
SARS-COV-2: NORMAL
SARS-COV-2: NOT DETECTED
SOURCE: NORMAL

## 2021-03-07 ENCOUNTER — TELEPHONE (OUTPATIENT)
Dept: PRIMARY CARE CLINIC | Age: 71
End: 2021-03-07

## 2021-03-10 ENCOUNTER — HOSPITAL ENCOUNTER (INPATIENT)
Age: 71
LOS: 1 days | Discharge: HOME | DRG: 038 | End: 2021-03-11
Attending: SURGERY | Admitting: SURGERY
Payer: MEDICARE

## 2021-03-10 ENCOUNTER — ANESTHESIA (OUTPATIENT)
Dept: OPERATING ROOM | Age: 71
DRG: 038 | End: 2021-03-10
Payer: MEDICARE

## 2021-03-10 ENCOUNTER — APPOINTMENT (OUTPATIENT)
Dept: GENERAL RADIOLOGY | Age: 71
DRG: 038 | End: 2021-03-10
Attending: SURGERY
Payer: MEDICARE

## 2021-03-10 VITALS — OXYGEN SATURATION: 94 % | TEMPERATURE: 99 F | DIASTOLIC BLOOD PRESSURE: 52 MMHG | SYSTOLIC BLOOD PRESSURE: 89 MMHG

## 2021-03-10 PROBLEM — I65.21 CAROTID STENOSIS, RIGHT: Status: ACTIVE | Noted: 2021-03-10

## 2021-03-10 PROBLEM — F10.90 ALCOHOL USE: Status: ACTIVE | Noted: 2021-03-10

## 2021-03-10 PROBLEM — Z78.9 ALCOHOL USE: Status: ACTIVE | Noted: 2021-03-10

## 2021-03-10 LAB
HCT VFR BLD CALC: 44.6 % (ref 40.7–50.3)
HEMOGLOBIN: 13.9 G/DL (ref 13–17)
MCH RBC QN AUTO: 29.3 PG (ref 25.2–33.5)
MCHC RBC AUTO-ENTMCNC: 31.2 G/DL (ref 28.4–34.8)
MCV RBC AUTO: 94.1 FL (ref 82.6–102.9)
NRBC AUTOMATED: 0 PER 100 WBC
PDW BLD-RTO: 13 % (ref 11.8–14.4)
PLATELET # BLD: 114 K/UL (ref 138–453)
PMV BLD AUTO: 11.5 FL (ref 8.1–13.5)
RBC # BLD: 4.74 M/UL (ref 4.21–5.77)
WBC # BLD: 28.9 K/UL (ref 3.5–11.3)

## 2021-03-10 PROCEDURE — 2780000010 HC IMPLANT OTHER: Performed by: SURGERY

## 2021-03-10 PROCEDURE — 6370000000 HC RX 637 (ALT 250 FOR IP): Performed by: SURGERY

## 2021-03-10 PROCEDURE — 03CK0ZZ EXTIRPATION OF MATTER FROM RIGHT INTERNAL CAROTID ARTERY, OPEN APPROACH: ICD-10-PCS | Performed by: SURGERY

## 2021-03-10 PROCEDURE — 6360000002 HC RX W HCPCS: Performed by: SURGERY

## 2021-03-10 PROCEDURE — 3700000000 HC ANESTHESIA ATTENDED CARE: Performed by: SURGERY

## 2021-03-10 PROCEDURE — 2000000000 HC ICU R&B

## 2021-03-10 PROCEDURE — 037K0ZZ DILATION OF RIGHT INTERNAL CAROTID ARTERY, OPEN APPROACH: ICD-10-PCS | Performed by: SURGERY

## 2021-03-10 PROCEDURE — 7100000001 HC PACU RECOVERY - ADDTL 15 MIN: Performed by: SURGERY

## 2021-03-10 PROCEDURE — 2709999900 HC NON-CHARGEABLE SUPPLY: Performed by: SURGERY

## 2021-03-10 PROCEDURE — 88304 TISSUE EXAM BY PATHOLOGIST: CPT

## 2021-03-10 PROCEDURE — 3700000001 HC ADD 15 MINUTES (ANESTHESIA): Performed by: SURGERY

## 2021-03-10 PROCEDURE — 2580000003 HC RX 258: Performed by: NURSE ANESTHETIST, CERTIFIED REGISTERED

## 2021-03-10 PROCEDURE — 3600000002 HC SURGERY LEVEL 2 BASE: Performed by: SURGERY

## 2021-03-10 PROCEDURE — 2580000003 HC RX 258: Performed by: NURSE PRACTITIONER

## 2021-03-10 PROCEDURE — 6360000002 HC RX W HCPCS: Performed by: NURSE ANESTHETIST, CERTIFIED REGISTERED

## 2021-03-10 PROCEDURE — 2580000003 HC RX 258: Performed by: ANESTHESIOLOGY

## 2021-03-10 PROCEDURE — 7100000000 HC PACU RECOVERY - FIRST 15 MIN: Performed by: SURGERY

## 2021-03-10 PROCEDURE — 99221 1ST HOSP IP/OBS SF/LOW 40: CPT | Performed by: INTERNAL MEDICINE

## 2021-03-10 PROCEDURE — 2500000003 HC RX 250 WO HCPCS: Performed by: SURGERY

## 2021-03-10 PROCEDURE — 6360000002 HC RX W HCPCS: Performed by: ORTHOPAEDIC SURGERY

## 2021-03-10 PROCEDURE — 6370000000 HC RX 637 (ALT 250 FOR IP): Performed by: INTERNAL MEDICINE

## 2021-03-10 PROCEDURE — C1768 GRAFT, VASCULAR: HCPCS | Performed by: SURGERY

## 2021-03-10 PROCEDURE — 88311 DECALCIFY TISSUE: CPT

## 2021-03-10 PROCEDURE — 85027 COMPLETE CBC AUTOMATED: CPT

## 2021-03-10 PROCEDURE — 3600000012 HC SURGERY LEVEL 2 ADDTL 15MIN: Performed by: SURGERY

## 2021-03-10 PROCEDURE — 2580000003 HC RX 258: Performed by: SURGERY

## 2021-03-10 PROCEDURE — 2500000003 HC RX 250 WO HCPCS: Performed by: NURSE ANESTHETIST, CERTIFIED REGISTERED

## 2021-03-10 PROCEDURE — 71045 X-RAY EXAM CHEST 1 VIEW: CPT

## 2021-03-10 DEVICE — PATCH VASC W0.8XL8CM PERIPH BOV PERICARD N PVC N DEHP CRSS: Type: IMPLANTABLE DEVICE | Site: CAROTID | Status: FUNCTIONAL

## 2021-03-10 RX ORDER — MORPHINE SULFATE 2 MG/ML
2 INJECTION, SOLUTION INTRAMUSCULAR; INTRAVENOUS
Status: DISCONTINUED | OUTPATIENT
Start: 2021-03-10 | End: 2021-03-11 | Stop reason: HOSPADM

## 2021-03-10 RX ORDER — NEOSTIGMINE METHYLSULFATE 5 MG/5 ML
SYRINGE (ML) INTRAVENOUS PRN
Status: DISCONTINUED | OUTPATIENT
Start: 2021-03-10 | End: 2021-03-10 | Stop reason: SDUPTHER

## 2021-03-10 RX ORDER — SODIUM CHLORIDE, SODIUM LACTATE, POTASSIUM CHLORIDE, CALCIUM CHLORIDE 600; 310; 30; 20 MG/100ML; MG/100ML; MG/100ML; MG/100ML
INJECTION, SOLUTION INTRAVENOUS CONTINUOUS PRN
Status: DISCONTINUED | OUTPATIENT
Start: 2021-03-10 | End: 2021-03-10 | Stop reason: SDUPTHER

## 2021-03-10 RX ORDER — ATORVASTATIN CALCIUM 40 MG/1
40 TABLET, FILM COATED ORAL NIGHTLY
Status: DISCONTINUED | OUTPATIENT
Start: 2021-03-10 | End: 2021-03-11 | Stop reason: HOSPADM

## 2021-03-10 RX ORDER — ONDANSETRON 2 MG/ML
INJECTION INTRAMUSCULAR; INTRAVENOUS PRN
Status: DISCONTINUED | OUTPATIENT
Start: 2021-03-10 | End: 2021-03-10 | Stop reason: SDUPTHER

## 2021-03-10 RX ORDER — ACETAMINOPHEN 325 MG/1
650 TABLET ORAL EVERY 4 HOURS PRN
Status: DISCONTINUED | OUTPATIENT
Start: 2021-03-10 | End: 2021-03-11 | Stop reason: HOSPADM

## 2021-03-10 RX ORDER — SODIUM CHLORIDE 0.9 % (FLUSH) 0.9 %
10 SYRINGE (ML) INJECTION PRN
Status: DISCONTINUED | OUTPATIENT
Start: 2021-03-10 | End: 2021-03-10 | Stop reason: HOSPADM

## 2021-03-10 RX ORDER — GAUZE BANDAGE 2" X 2"
100 BANDAGE TOPICAL DAILY
Status: DISCONTINUED | OUTPATIENT
Start: 2021-03-10 | End: 2021-03-11 | Stop reason: HOSPADM

## 2021-03-10 RX ORDER — METOPROLOL SUCCINATE 25 MG/1
25 TABLET, EXTENDED RELEASE ORAL DAILY
Status: DISCONTINUED | OUTPATIENT
Start: 2021-03-11 | End: 2021-03-11 | Stop reason: HOSPADM

## 2021-03-10 RX ORDER — LIDOCAINE HYDROCHLORIDE 10 MG/ML
1 INJECTION, SOLUTION EPIDURAL; INFILTRATION; INTRACAUDAL; PERINEURAL
Status: DISCONTINUED | OUTPATIENT
Start: 2021-03-10 | End: 2021-03-10 | Stop reason: HOSPADM

## 2021-03-10 RX ORDER — SODIUM CHLORIDE 0.9 % (FLUSH) 0.9 %
10 SYRINGE (ML) INJECTION EVERY 12 HOURS SCHEDULED
Status: DISCONTINUED | OUTPATIENT
Start: 2021-03-10 | End: 2021-03-10 | Stop reason: HOSPADM

## 2021-03-10 RX ORDER — LIDOCAINE HYDROCHLORIDE 20 MG/ML
INJECTION, SOLUTION EPIDURAL; INFILTRATION; INTRACAUDAL; PERINEURAL PRN
Status: DISCONTINUED | OUTPATIENT
Start: 2021-03-10 | End: 2021-03-10 | Stop reason: SDUPTHER

## 2021-03-10 RX ORDER — ATORVASTATIN CALCIUM 20 MG/1
20 TABLET, FILM COATED ORAL NIGHTLY
Status: DISCONTINUED | OUTPATIENT
Start: 2021-03-11 | End: 2021-03-10

## 2021-03-10 RX ORDER — SODIUM CHLORIDE, SODIUM LACTATE, POTASSIUM CHLORIDE, CALCIUM CHLORIDE 600; 310; 30; 20 MG/100ML; MG/100ML; MG/100ML; MG/100ML
INJECTION, SOLUTION INTRAVENOUS CONTINUOUS
Status: DISCONTINUED | OUTPATIENT
Start: 2021-03-10 | End: 2021-03-11 | Stop reason: HOSPADM

## 2021-03-10 RX ORDER — FENTANYL CITRATE 50 UG/ML
25 INJECTION, SOLUTION INTRAMUSCULAR; INTRAVENOUS EVERY 5 MIN PRN
Status: DISCONTINUED | OUTPATIENT
Start: 2021-03-10 | End: 2021-03-10 | Stop reason: HOSPADM

## 2021-03-10 RX ORDER — SODIUM CHLORIDE 0.9 % (FLUSH) 0.9 %
10 SYRINGE (ML) INJECTION PRN
Status: DISCONTINUED | OUTPATIENT
Start: 2021-03-10 | End: 2021-03-11 | Stop reason: HOSPADM

## 2021-03-10 RX ORDER — SIMVASTATIN 10 MG
40 TABLET ORAL NIGHTLY
Status: DISCONTINUED | OUTPATIENT
Start: 2021-03-10 | End: 2021-03-10

## 2021-03-10 RX ORDER — MORPHINE SULFATE 4 MG/ML
4 INJECTION, SOLUTION INTRAMUSCULAR; INTRAVENOUS
Status: DISCONTINUED | OUTPATIENT
Start: 2021-03-10 | End: 2021-03-11 | Stop reason: HOSPADM

## 2021-03-10 RX ORDER — 0.9 % SODIUM CHLORIDE 0.9 %
500 INTRAVENOUS SOLUTION INTRAVENOUS ONCE
Status: COMPLETED | OUTPATIENT
Start: 2021-03-10 | End: 2021-03-10

## 2021-03-10 RX ORDER — SODIUM CHLORIDE, SODIUM LACTATE, POTASSIUM CHLORIDE, CALCIUM CHLORIDE 600; 310; 30; 20 MG/100ML; MG/100ML; MG/100ML; MG/100ML
INJECTION, SOLUTION INTRAVENOUS CONTINUOUS
Status: DISCONTINUED | OUTPATIENT
Start: 2021-03-10 | End: 2021-03-10

## 2021-03-10 RX ORDER — PROPOFOL 10 MG/ML
INJECTION, EMULSION INTRAVENOUS PRN
Status: DISCONTINUED | OUTPATIENT
Start: 2021-03-10 | End: 2021-03-10 | Stop reason: SDUPTHER

## 2021-03-10 RX ORDER — LIDOCAINE HYDROCHLORIDE 10 MG/ML
INJECTION, SOLUTION EPIDURAL; INFILTRATION; INTRACAUDAL; PERINEURAL PRN
Status: DISCONTINUED | OUTPATIENT
Start: 2021-03-10 | End: 2021-03-10 | Stop reason: HOSPADM

## 2021-03-10 RX ORDER — ROCURONIUM BROMIDE 10 MG/ML
INJECTION, SOLUTION INTRAVENOUS PRN
Status: DISCONTINUED | OUTPATIENT
Start: 2021-03-10 | End: 2021-03-10 | Stop reason: SDUPTHER

## 2021-03-10 RX ORDER — SODIUM CHLORIDE 0.9 % (FLUSH) 0.9 %
10 SYRINGE (ML) INJECTION EVERY 12 HOURS SCHEDULED
Status: DISCONTINUED | OUTPATIENT
Start: 2021-03-10 | End: 2021-03-11 | Stop reason: HOSPADM

## 2021-03-10 RX ORDER — HYDROMORPHONE HCL 110MG/55ML
0.25 PATIENT CONTROLLED ANALGESIA SYRINGE INTRAVENOUS EVERY 5 MIN PRN
Status: DISCONTINUED | OUTPATIENT
Start: 2021-03-10 | End: 2021-03-10 | Stop reason: HOSPADM

## 2021-03-10 RX ORDER — DEXAMETHASONE SODIUM PHOSPHATE 10 MG/ML
INJECTION INTRAMUSCULAR; INTRAVENOUS PRN
Status: DISCONTINUED | OUTPATIENT
Start: 2021-03-10 | End: 2021-03-10 | Stop reason: SDUPTHER

## 2021-03-10 RX ORDER — LISINOPRIL 10 MG/1
10 TABLET ORAL DAILY
Status: DISCONTINUED | OUTPATIENT
Start: 2021-03-10 | End: 2021-03-11 | Stop reason: HOSPADM

## 2021-03-10 RX ORDER — PANTOPRAZOLE SODIUM 40 MG/1
40 TABLET, DELAYED RELEASE ORAL
Status: DISCONTINUED | OUTPATIENT
Start: 2021-03-11 | End: 2021-03-11 | Stop reason: HOSPADM

## 2021-03-10 RX ORDER — FENTANYL CITRATE 50 UG/ML
INJECTION, SOLUTION INTRAMUSCULAR; INTRAVENOUS PRN
Status: DISCONTINUED | OUTPATIENT
Start: 2021-03-10 | End: 2021-03-10 | Stop reason: SDUPTHER

## 2021-03-10 RX ORDER — 0.9 % SODIUM CHLORIDE 0.9 %
500 INTRAVENOUS SOLUTION INTRAVENOUS ONCE
Status: COMPLETED | OUTPATIENT
Start: 2021-03-10 | End: 2021-03-11

## 2021-03-10 RX ORDER — GLYCOPYRROLATE 1 MG/5 ML
SYRINGE (ML) INTRAVENOUS PRN
Status: DISCONTINUED | OUTPATIENT
Start: 2021-03-10 | End: 2021-03-10 | Stop reason: SDUPTHER

## 2021-03-10 RX ORDER — SODIUM CHLORIDE 9 MG/ML
INJECTION, SOLUTION INTRAVENOUS CONTINUOUS
Status: DISCONTINUED | OUTPATIENT
Start: 2021-03-11 | End: 2021-03-10

## 2021-03-10 RX ORDER — HEPARIN SODIUM 1000 [USP'U]/ML
INJECTION, SOLUTION INTRAVENOUS; SUBCUTANEOUS PRN
Status: DISCONTINUED | OUTPATIENT
Start: 2021-03-10 | End: 2021-03-10 | Stop reason: SDUPTHER

## 2021-03-10 RX ORDER — ASPIRIN 81 MG/1
81 TABLET ORAL DAILY
Status: DISCONTINUED | OUTPATIENT
Start: 2021-03-10 | End: 2021-03-10

## 2021-03-10 RX ORDER — ONDANSETRON 2 MG/ML
4 INJECTION INTRAMUSCULAR; INTRAVENOUS
Status: DISCONTINUED | OUTPATIENT
Start: 2021-03-10 | End: 2021-03-10 | Stop reason: HOSPADM

## 2021-03-10 RX ADMIN — Medication 3 MG: at 12:25

## 2021-03-10 RX ADMIN — FENTANYL CITRATE 50 MCG: 50 INJECTION, SOLUTION INTRAMUSCULAR; INTRAVENOUS at 09:53

## 2021-03-10 RX ADMIN — Medication 0.6 MG: at 12:25

## 2021-03-10 RX ADMIN — FENTANYL CITRATE 50 MCG: 50 INJECTION, SOLUTION INTRAMUSCULAR; INTRAVENOUS at 10:31

## 2021-03-10 RX ADMIN — ATORVASTATIN CALCIUM 40 MG: 40 TABLET, FILM COATED ORAL at 20:09

## 2021-03-10 RX ADMIN — Medication 100 MG: at 20:09

## 2021-03-10 RX ADMIN — PROPOFOL 40 MG: 10 INJECTION, EMULSION INTRAVENOUS at 09:54

## 2021-03-10 RX ADMIN — CEFAZOLIN SODIUM 2000 MG: 10 INJECTION, POWDER, FOR SOLUTION INTRAVENOUS at 17:03

## 2021-03-10 RX ADMIN — HEPARIN SODIUM 9000 UNITS: 1000 INJECTION INTRAVENOUS; SUBCUTANEOUS at 11:02

## 2021-03-10 RX ADMIN — DEXAMETHASONE SODIUM PHOSPHATE 10 MG: 10 INJECTION, SOLUTION INTRAMUSCULAR; INTRAVENOUS at 10:16

## 2021-03-10 RX ADMIN — CEFAZOLIN 2 G: 10 INJECTION, POWDER, FOR SOLUTION INTRAVENOUS at 10:13

## 2021-03-10 RX ADMIN — ONDANSETRON 4 MG: 2 INJECTION INTRAMUSCULAR; INTRAVENOUS at 10:16

## 2021-03-10 RX ADMIN — SODIUM CHLORIDE 500 ML: 9 INJECTION, SOLUTION INTRAVENOUS at 20:09

## 2021-03-10 RX ADMIN — SODIUM CHLORIDE, POTASSIUM CHLORIDE, SODIUM LACTATE AND CALCIUM CHLORIDE: 600; 310; 30; 20 INJECTION, SOLUTION INTRAVENOUS at 08:22

## 2021-03-10 RX ADMIN — FENTANYL CITRATE 25 MCG: 50 INJECTION, SOLUTION INTRAMUSCULAR; INTRAVENOUS at 11:31

## 2021-03-10 RX ADMIN — PROPOFOL 60 MG: 10 INJECTION, EMULSION INTRAVENOUS at 10:32

## 2021-03-10 RX ADMIN — LISINOPRIL 10 MG: 10 TABLET ORAL at 15:48

## 2021-03-10 RX ADMIN — MORPHINE SULFATE 2 MG: 2 INJECTION, SOLUTION INTRAMUSCULAR; INTRAVENOUS at 16:02

## 2021-03-10 RX ADMIN — SODIUM CHLORIDE 500 ML: 0.9 INJECTION, SOLUTION INTRAVENOUS at 23:00

## 2021-03-10 RX ADMIN — LIDOCAINE HYDROCHLORIDE 100 MG: 20 INJECTION, SOLUTION EPIDURAL; INFILTRATION; INTRACAUDAL; PERINEURAL at 09:53

## 2021-03-10 RX ADMIN — SODIUM CHLORIDE, POTASSIUM CHLORIDE, SODIUM LACTATE AND CALCIUM CHLORIDE: 600; 310; 30; 20 INJECTION, SOLUTION INTRAVENOUS at 10:02

## 2021-03-10 RX ADMIN — PHENYLEPHRINE HYDROCHLORIDE 20 MCG/MIN: 10 INJECTION, SOLUTION INTRAMUSCULAR; INTRAVENOUS; SUBCUTANEOUS at 10:10

## 2021-03-10 RX ADMIN — SODIUM CHLORIDE, POTASSIUM CHLORIDE, SODIUM LACTATE AND CALCIUM CHLORIDE: 600; 310; 30; 20 INJECTION, SOLUTION INTRAVENOUS at 15:09

## 2021-03-10 RX ADMIN — PROPOFOL 100 MG: 10 INJECTION, EMULSION INTRAVENOUS at 09:53

## 2021-03-10 RX ADMIN — ROCURONIUM BROMIDE 50 MG: 10 INJECTION, SOLUTION INTRAVENOUS at 09:55

## 2021-03-10 ASSESSMENT — PULMONARY FUNCTION TESTS
PIF_VALUE: 19
PIF_VALUE: 19
PIF_VALUE: 22
PIF_VALUE: 19
PIF_VALUE: 18
PIF_VALUE: 18
PIF_VALUE: 21
PIF_VALUE: 21
PIF_VALUE: 19
PIF_VALUE: 17
PIF_VALUE: 16
PIF_VALUE: 16
PIF_VALUE: 21
PIF_VALUE: 18
PIF_VALUE: 17
PIF_VALUE: 18
PIF_VALUE: 18
PIF_VALUE: 20
PIF_VALUE: 1
PIF_VALUE: 16
PIF_VALUE: 10
PIF_VALUE: 1
PIF_VALUE: 20
PIF_VALUE: 1
PIF_VALUE: 18
PIF_VALUE: 19
PIF_VALUE: 1
PIF_VALUE: 16
PIF_VALUE: 21
PIF_VALUE: 18
PIF_VALUE: 18
PIF_VALUE: 19
PIF_VALUE: 1
PIF_VALUE: 17
PIF_VALUE: 18
PIF_VALUE: 19
PIF_VALUE: 18
PIF_VALUE: 19
PIF_VALUE: 16
PIF_VALUE: 19
PIF_VALUE: 19
PIF_VALUE: 17
PIF_VALUE: 23
PIF_VALUE: 10
PIF_VALUE: 18
PIF_VALUE: 19
PIF_VALUE: 18
PIF_VALUE: 19
PIF_VALUE: 19
PIF_VALUE: 17
PIF_VALUE: 18
PIF_VALUE: 19
PIF_VALUE: 22
PIF_VALUE: 18
PIF_VALUE: 18
PIF_VALUE: 17
PIF_VALUE: 1
PIF_VALUE: 19
PIF_VALUE: 16
PIF_VALUE: 17
PIF_VALUE: 1
PIF_VALUE: 3
PIF_VALUE: 17
PIF_VALUE: 20
PIF_VALUE: 22
PIF_VALUE: 17
PIF_VALUE: 1
PIF_VALUE: 19
PIF_VALUE: 17
PIF_VALUE: 18
PIF_VALUE: 16
PIF_VALUE: 19
PIF_VALUE: 24
PIF_VALUE: 20
PIF_VALUE: 20
PIF_VALUE: 16
PIF_VALUE: 18
PIF_VALUE: 18
PIF_VALUE: 19
PIF_VALUE: 18
PIF_VALUE: 19
PIF_VALUE: 18
PIF_VALUE: 20
PIF_VALUE: 18
PIF_VALUE: 19
PIF_VALUE: 20
PIF_VALUE: 16
PIF_VALUE: 20
PIF_VALUE: 18
PIF_VALUE: 18
PIF_VALUE: 1
PIF_VALUE: 16
PIF_VALUE: 18
PIF_VALUE: 19
PIF_VALUE: 21
PIF_VALUE: 16

## 2021-03-10 ASSESSMENT — PAIN - FUNCTIONAL ASSESSMENT
PAIN_FUNCTIONAL_ASSESSMENT: 0-10

## 2021-03-10 ASSESSMENT — PAIN SCALES - GENERAL: PAINLEVEL_OUTOF10: 10

## 2021-03-10 ASSESSMENT — PAIN DESCRIPTION - LOCATION: LOCATION: HEAD;NECK

## 2021-03-10 ASSESSMENT — PAIN DESCRIPTION - ORIENTATION: ORIENTATION: RIGHT

## 2021-03-10 ASSESSMENT — PAIN DESCRIPTION - DESCRIPTORS: DESCRIPTORS: ACHING

## 2021-03-10 NOTE — CONSULTS
[facility]  Office: [address], DO, Kasia Mann, DO, Ernesto Whitaker, DO, Stella Brothers, DO, Andrew Calvo MD, Arsalan Parisi MD, Sho Aguirre MD, Robi Lemos MD, Sharyle Anon, MD, Elizabeth Bangura MD, Marvin Norris MD, Lisy King MD, Damaris Marte MD, Kayy Roman, DO, Selin Ho MD, Juan Cuellar DO, Yamila Tay MD,  Milady Putnam DO, Keren Leonard MD, Obdulio Ziegler MD, Paul Hartmann, [facility], [facility], CNP, Nasrin Lozano, CNP, Nguyễn Denis, CNS, Diamond Richter, CNP, Ailin Lowery, CNP, Terryann Collet, CNP, Sindy Bella, CNP, Barber Ramos, CNP, Esteban Lovell PA-C, Ferdinand Naik [facility], Pradeep Joel, CNP, Sonu Macdonald, CNP, Orville Palacios, CNP, Vivian Palacios, CNP, Mikie Person, CNP, Lucrecia Lee, [facility] / HISTORY AND PHYSICAL EXAMINATION            Date:   3/10/2021  Patient name:  Dannie Locke  Date of admission:  3/10/2021  7:37 AM  MRN:   [id number]  Account:  [de-identified]  YOB: 1950  PCP:    LIZETH Schaffer CNP  Room:   2032/2032-01  Code Status:    Full Code    Physician Requesting Consult: Josseline Duff MD    Reason for Consult:  Medical management    Chief Complaint:     cea    History Obtained From:     patient, electronic medical record    History of Present Illness: This 80 yom presents for elective right carotid surgery. Postop doing well, though he feels like his tongue is somewhat swollen but no trouble swallowing or breathing.     Past Medical History:     Past Medical History:   Diagnosis Date    Arthritis     Spine, left wrist    CAD (coronary artery disease)     heart attack x 2 with stent    CLL (chronic lymphocytic leukemia) (HCC)     Colon polyps     COPD (chronic obstructive pulmonary disease) (HCC)     History of heart artery stent     x 1 Placed in 1992 after MI    Hyperlipidemia     Lymphoma ([facility]     Right side of neck, no chemo or radiation    MI (myocardial infarction) (HCC)     approx. 1992 per pt.  Wears dentures     Wears eyeglasses         Past Surgical History:     Past Surgical History:   Procedure Laterality Date    ARTHROTOMY      right foot big toe then removed    BACK SURGERY      CAROTID ENDARTERECTOMY Left 4/16/2014    CAROTID ENDARTERECTOMY Left 10/26/2020    with Lymph node biopsy fro left neck    CAROTID ENDARTERECTOMY Left 10/26/2020    REDO LEFT CAROTID ENDARTERECTOMY WITH LEFT CERVICAL LYMPH NODE BIOPSY performed by Emilia Miranda MD at [address] [facility] Right 3/10/2021    RIGHT CAROTID ENDARTERECTOMY performed by Emilia Miranda MD at [address] [place] [address] per pt.  CORONARY ANGIOPLASTY WITH STENT PLACEMENT      stent x1    EYE SURGERY Bilateral     Foreign body removal    FINGER AMPUTATION Left     ring finger    HYDROCELE EXCISION Left     LUMBAR DISC SURGERY      L5-S1    LYMPH NODE DISSECTION      right side of neck    RECTAL SURGERY      fissure        Medications Prior to Admission:     Prior to Admission medications    Medication Sig Start Date End Date Taking? Authorizing Provider   lisinopril (PRINIVIL;ZESTRIL) 10 MG tablet Take 1 tablet by mouth daily 1/11/21  Yes LIZETH Reece - CNP   simvastatin (ZOCOR) 40 MG tablet Take 40 mg by mouth nightly    Yes Historical Provider, MD   Omeprazole 20 MG TBEC Take 20 tablets by mouth daily. Yes Historical Provider, MD   metoprolol (TOPROL-XL) 25 MG XL tablet Take 25 mg by mouth daily. Yes Historical Provider, MD   aspirin 325 MG EC tablet Take 1 tablet by mouth daily. 4/17/14   Emilia Miranda MD        Allergies:     Patient has no known allergies. Social History:     Tobacco:    reports that he quit smoking about a year ago. His smoking use included cigarettes. He has a 12.00 pack-year smoking history.  He has never used smokeless tobacco.  Alcohol:      reports current alcohol use of about 21.0 standard drinks of alcohol per week. Drug Use:  reports no history of drug use. Family History:     Family History   Problem Relation Age of Onset    Stroke Father         two strokes  at 69    Other Mother          at 80 from unknown causes    Other Brother         alcoholism       Review of Systems:     Positive and Negative as described in HPI. CONSTITUTIONAL:  negative for fevers, chills, sweats, fatigue, weight loss  HEENT:  negative for vision, hearing changes, runny nose, throat pain  RESPIRATORY:  negative for shortness of breath, cough, congestion, wheezing. CARDIOVASCULAR:  negative for chest pain, palpitations. GASTROINTESTINAL:  negative for nausea, vomiting, diarrhea, constipation, change in bowel habits, abdominal pain   GENITOURINARY:  negative for difficulty of urination, burning with urination, frequency   INTEGUMENT:  negative for rash, skin lesions, easy bruising   HEMATOLOGIC/LYMPHATIC:  negative for swelling/edema   ALLERGIC/IMMUNOLOGIC:  negative for urticaria , itching  ENDOCRINE:  negative increase in drinking, increase in urination, hot or cold intolerance  MUSCULOSKELETAL:  negative joint pains, muscle aches, swelling of joints  NEUROLOGICAL:  negative for headaches, dizziness, lightheadedness, numbness, pain, tingling extremities  BEHAVIOR/PSYCH:  negative for depression, anxiety    Physical Exam:     BP (!) 119/56   Pulse 50   Temp 98.5 °F (36.9 °C) (Temporal)   Resp 15   Ht 5' 10\" (1.778 m)   Wt 207 lb (93.9 kg)   SpO2 97%   BMI 29.70 kg/m²   Temp (24hrs), Av.4 °F (36.3 °C), Min:96.6 °F (35.9 °C), Max:99 °F (37.2 °C)    No results for input(s): POCGLU in the last 72 hours.     Intake/Output Summary (Last 24 hours) at 3/10/2021 1735  Last data filed at 3/10/2021 1415  Gross per 24 hour   Intake 1885 ml   Output 50 ml   Net 1835 ml       General Appearance:  alert, well appearing, and in no acute distress  Mental status: oriented to person, place, and time with normal affect  Head:  normocephalic, atraumatic. Eye: no icterus, redness, pupils equal and reactive, extraocular eye movements intact, conjunctiva clear  Ear: normal external ear, no discharge, hearing intact  Nose:  no drainage noted  Mouth: mucous membranes moist; tongue does not appear swollen  Neck: supple, no carotid bruits, thyroid not palpable  Lungs: Bilateral equal air entry, clear to ausculation, no wheezing, rales or rhonchi, normal effort  Cardiovascular: brenton, regular rhythm, no murmur, gallop, rub. Abdomen: Soft, nontender, nondistended, normal bowel sounds, no hepatomegaly or splenomegaly  Neurologic: There are no new focal motor or sensory deficits, normal muscle tone and bulk, no abnormal sensation, normal speech, cranial nerves II through XII grossly intact  Skin: No gross lesions, rashes, bruising or bleeding on exposed skin area  Extremities:  peripheral pulses palpable, no pedal edema or calf pain with palpation  Psych: normal affect     Investigations:      Laboratory Testing:  Recent Results (from the past 24 hour(s))   CBC    Collection Time: 03/10/21  8:16 AM   Result Value Ref Range    WBC 28.9 (H) 3.5 - 11.3 k/uL    RBC 4.74 4.21 - 5.77 m/uL    Hemoglobin 13.9 13.0 - 17.0 g/dL    Hematocrit 44.6 40.7 - 50.3 %    MCV 94.1 82.6 - 102.9 fL    MCH 29.3 25.2 - 33.5 pg    MCHC 31.2 28.4 - 34.8 g/dL    RDW 13.0 11.8 - 14.4 %    Platelets 061 (L) 388 - 453 k/uL    MPV 11.5 8.1 - 13.5 fL    NRBC Automated 0.0 0.0 per 100 WBC       Imaging/Diagonstics:  Xr Chest Portable    Result Date: 3/10/2021  Stable mild cardiomegaly. Mild pulmonary vascular congestion.        Assessment :      Hospital Problems           Last Modified POA    * (Principal) Carotid artery stenosis 3/10/2021 Yes    Essential hypertension 3/10/2021 Yes    Coronary artery disease involving native coronary artery of native heart without angina pectoris 3/10/2021 Yes    Thrombocytopenia ([address] 3/10/2021 Yes Carotid stenosis, right 3/10/2021 Yes    Alcohol use 3/10/2021 Yes          Plan:     1. D/w Dr Maria L Colindres  2. Ancef 2g x 2 doses  3. Statin therapy-would switch zocor to lipitor for better effects  4. Recheck cbc in am: high wbc-though review of old labs shows he is always high  5. Thiamine: moderate alcohol use  6. Monitor for alcohol withdrawal  7.  Cont home meds for htn    Consultations:   [address] [facility], DO  3/10/2021  5:35 PM    Copy sent to Dr. Tre Garza, APRN - CNP

## 2021-03-10 NOTE — ANESTHESIA POSTPROCEDURE EVALUATION
Department of Anesthesiology  Postprocedure Note    Patient: Deonna Su  MRN: [id number]  YOB: 1950  Date of evaluation: 3/10/2021  Time:  4:17 PM     Procedure Summary     Date: 03/10/21 Room / Location: [address] - INPATIENT    Anesthesia Start: 9192 Anesthesia Stop: 3012    Procedure: RIGHT CAROTID ENDARTERECTOMY (Right Neck) Diagnosis: (CAROTID STENOSIS)    Surgeons: Cordell James MD Responsible Provider: Brielle Astorga MD    Anesthesia Type: general ASA Status: 4          Anesthesia Type: general    Elmira Phase I: Elmira Score: 10    [place]mira Phase II:      Last vitals: Reviewed and per EMR flowsheets.        Anesthesia Post Evaluation    Patient location during evaluation: PACU  Patient participation: complete - patient participated  Level of consciousness: awake  Airway patency: patent  Nausea & Vomiting: no nausea  Complications: no  Cardiovascular status: blood pressure returned to baseline  Respiratory status: acceptable  Hydration status: euvolemic

## 2021-03-10 NOTE — FLOWSHEET NOTE
Pt reports difficulty breathing from \"left nostril and left lung\". VSS and pt's resp status and circulation appears WNL. Dr Edmundo Lan called and assessed pt. CXR ordered and completed. Breathing treatment administered.  Supportive care offered

## 2021-03-10 NOTE — BRIEF OP NOTE
Brief Postoperative Note      Patient: Kinjal Almaguer  YOB: 1950  MRN: [id number]    Date of Procedure: 3/10/2021    Pre-Op Diagnosis: Severe 80 to 85% right internal carotid artery stenosis     Post-Op Diagnosis: Critical 99% right internal carotid artery stenosis       Procedure(s):  RIGHT CAROTID ENDARTERECTOMY    Surgeon(s):  Vinh Weber MD    Assistant:  Surgical Assistant: Jostin Sky    Anesthesia: General    Estimated Blood Loss (mL): less than 50     Complications: None    Specimens:   ID Type Source Tests Collected by Time Destination   A : RIGHT CAROTID PLAQUE Tissue Carotid Arteries [address] [place] MD Karo 3/10/2021 1206        Implants:  Implant Name Type Inv.  Item Serial No.  Lot No. LRB No. Used Action   PATCH VASC [id number] PERIPH BOV PERICARD N PVC N DEHP CRSS  PATCH VASC [id number] PERIPH BOV PERICARD N PVC N DEHP CRSS  [employer]- [id number] Right 1 Implanted         Drains: * No LDAs found *    Findings: Functional proximal occlusion of the right internal carotid with fresh thrombus and minimal backbleeding suggesting high-grade intracranial stenosis    Electronically signed by Vinh Weber MD on 3/10/2021 at 12:52 PM

## 2021-03-10 NOTE — ANESTHESIA PROCEDURE NOTES
Arterial Line:    An arterial line was placed using ultrasound guidance, in the pre-op for the following indication(s): continuous blood pressure monitoring. A 20 gauge (size), (length), Arrow (type) catheter was placed, Seldinger technique used, into the left radial artery, secured by tape and Tegaderm. Anesthesia type: Local  Local infiltration: Injection    Events:  patient tolerated procedure well with no complications.   3/10/2021 8:59 AM3/10/2021 9:09 AM  Anesthesiologist: Kira Franklin MD  Performed: Anesthesiologist   Preanesthetic Checklist  Completed: patient identified, IV checked, site marked, risks and benefits discussed, surgical consent, monitors and equipment checked, pre-op evaluation, timeout performed, anesthesia consent given, oxygen available and patient being monitored

## 2021-03-10 NOTE — H&P
History and Physical Service   [facility] [place] - INPATIENT    HISTORY AND PHYSICAL EXAMINATION            Date of Evaluation: 3/10/2021  Patient name:  Lauro Fothergill  MRN:   [id number]  YOB: 1950  PCP:    LIZETH Naqvi CNP    History Obtained From:     Patient, medical records    History of Present Illness: This is Lauro Fothergill a 80 y.o. male who presents today for a RIGHT CAROTID ENDARTERECTOMY  by Dr. Nica Carrillo  for CAROTID STENOSIS he HAS HISTORY OF HEART DISEASE WITH S/P MI AND ANGIOPLASTY WITH 1 STENT . HAS HISTORY OF LYMPHOMA AND CHRONIC LYMPHOCYTIC LEUKEMIA. HE DENIES SYMPTOMS OF VISION CHANGES OR DIZZINESS. HE DENIES GAIT OR BALANCE CHANGES. HE DOES HAVE FLEETING DIZZINESS WHEN HE TURNS HIS  HEAD QUICKLY. Rica Palacios HE PRESENTS FOR RIGHT CAROTID ENDARTERECTOMY. Past Medical History:     Past Medical History:   Diagnosis Date    Arthritis     Spine, left wrist    CAD (coronary artery disease)     heart attack x 2 with stent    CLL (chronic lymphocytic leukemia) (HCC)     Colon polyps     COPD (chronic obstructive pulmonary disease) (HCC)     History of heart artery stent     x 1 Placed in 1992 after MI    Hyperlipidemia     Lymphoma ([address]     Right side of neck, no chemo or radiation    MI (myocardial infarction) ([address]     approx. 1992 per pt.  Wears dentures     Wears eyeglasses         Past Surgical History:     Past Surgical History:   Procedure Laterality Date    ARTHROTOMY      right foot big toe then removed    BACK SURGERY      CAROTID ENDARTERECTOMY Left 4/16/2014    CAROTID ENDARTERECTOMY Left 10/26/2020    with Lymph node biopsy fro left neck    CAROTID ENDARTERECTOMY Left 10/26/2020    REDO LEFT CAROTID ENDARTERECTOMY WITH LEFT CERVICAL LYMPH NODE BIOPSY performed by Delores Matthews MD at [address] [place] [address] per pt.     CORONARY ANGIOPLASTY WITH STENT PLACEMENT      stent x1    EYE SURGERY Bilateral     Foreign body removal    FINGER AMPUTATION Left     ring finger    HYDROCELE EXCISION Left     LUMBAR DISC SURGERY      L5-S1    LYMPH NODE DISSECTION      right side of neck    RECTAL SURGERY      fissure        Medications Prior to Admission:     Prior to Admission medications    Medication Sig Start Date End Date Taking? Authorizing Provider   lisinopril (PRINIVIL;ZESTRIL) 10 MG tablet Take 1 tablet by mouth daily 21  Yes Antonia Chavez APRN - CNP   simvastatin (ZOCOR) 40 MG tablet Take 40 mg by mouth nightly    Yes Historical Provider, MD   Omeprazole 20 MG TBEC Take 20 tablets by mouth daily. Yes Historical Provider, MD   metoprolol (TOPROL-XL) 25 MG XL tablet Take 25 mg by mouth daily. Yes Historical Provider, MD   aspirin 325 MG EC tablet Take 1 tablet by mouth daily. 14   Dang Wright MD        Allergies:     Patient has no known allergies. Social History:     Tobacco:    reports that he quit smoking about a year ago. His smoking use included cigarettes. He has a 12.00 pack-year smoking history. He has never used smokeless tobacco.  Alcohol:      reports current alcohol use of about 21.0 standard drinks of alcohol per week. Drug Use:  reports no history of drug use. Family History:     Family History   Problem Relation Age of Onset    Stroke Father         two strokes  at 69    Other Mother          at 80 from unknown causes    Other Brother         alcoholism       Review of Systems:     Positive and Negative as described in HPI. CONSTITUTIONAL:  negative for fevers, chills, sweats, fatigue, weight loss  HEENT:  negative for vision, hearing changes, runny nose, throat pain  RESPIRATORY:  negative for shortness of breath, cough, congestion, wheezing. CARDIOVASCULAR:  negative for chest pain, palpitations.   GASTROINTESTINAL:  negative for nausea, vomiting, diarrhea, constipation, change in bowel habits, abdominal pain   GENITOURINARY:  negative for difficulty of urination, burning with urination, frequency   INTEGUMENT:  negative for rash, skin lesions, easy bruising   HEMATOLOGIC/LYMPHATIC:  negative for swelling/edema   ALLERGIC/IMMUNOLOGIC:  negative for urticaria , itching  ENDOCRINE:  negative increase in drinking, increase in urination, hot or cold intolerance  MUSCULOSKELETAL:  negative joint pains, muscle aches, swelling of joints  NEUROLOGICAL:  negative for headaches, dizziness, lightheadedness, numbness, pain, tingling extremities  BEHAVIOR/PSYCH:  negative for depression, anxiety    Physical Exam:   BP (!) 153/65   Pulse 56   Temp 96.6 °F (35.9 °C) (Temporal)   Resp 16   Wt 207 lb (93.9 kg)   SpO2 97%   BMI 29.70 kg/m²   . No results for input(s): POCGLU in the last 72 hours. General Appearance:  alert, well appearing, and in no acute distress  Mental status: oriented to person, place, and time with normal affect  Head:  normocephalic, atraumatic. Eye: no icterus, redness, pupils equal and reactive, extraocular eye movements intact, conjunctiva clear  Ear: normal external ear, no discharge, hearing intact  Nose:  no drainage noted  Mouth: mucous membranes moist  Neck: supple,HAS A RIGHT  carotid bruit, NONE ON THE LEFT , thyroid not palpable  Lungs: Bilateral equal air entry, clear to ausculation, no wheezing, rales or rhonchi, normal effort  Cardiovascular: HR  normal rate, regular rhythm, no murmur, gallop, rub. Abdomen: Soft, nontender, nondistended, normal bowel sounds  Neurologic: There are no new focal motor or sensory deficits, normal muscle tone and bulk, no abnormal sensation, normal speech, cranial nerves II through XII grossly intact  Skin: No gross lesions, rashes, bruising or bleeding on exposed skin area  Extremities:  peripheral pulses palpable, no pedal edema or calf pain with palpation  Psych: normal affect     Investigations:      Laboratory Testing:  No results found for this or any previous visit (from the past 24 hour(s)).     Recent Labs 03/04/21  1004   HGB 13.4   HCT 43.6   WBC 22.2*   MCV 97.5      K 4.5      CO2 26   BUN 17   CREATININE 0.97   INR 1.0   PROTIME 12.8   APTT 26.7       Recent Labs     03/05/21  1108   COVID19 Not Detected           Imaging/Diagnostics:    No results found. EKG OF 3/4/2021 ON THE SHORT CHART. Diagnosis:      1. RIGHT CAROTID STENOSIS     2.CHRONIC LYMPHOCYTIC LEUKEMIA  Plans:     1.  RIGHT CAROTID ENDARTERECTOMY       LIZETH Swenson - CNP  3/10/2021  7:59 AM

## 2021-03-10 NOTE — ANESTHESIA PRE PROCEDURE
Department of Anesthesiology  Preprocedure Note       Name:  Haylee Davis   Age:  80 y.o.  :  1950                                          MRN:  [id number]         Date:  3/10/2021      Surgeon: Yoli Hernadez):  Mango Barreto MD    Procedure: Procedure(s):  RIGHT CAROTID ENDARTERECTOMY    Medications prior to admission:   Prior to Admission medications    Medication Sig Start Date End Date Taking? Authorizing Provider   lisinopril (PRINIVIL;ZESTRIL) 10 MG tablet Take 1 tablet by mouth daily 21   Anum Lemon, APRN - CNP   aspirin 325 MG EC tablet Take 1 tablet by mouth daily. 14   Mango Barreto MD   simvastatin (ZOCOR) 40 MG tablet Take 40 mg by mouth nightly     Historical Provider, MD   Omeprazole 20 MG TBEC Take 20 tablets by mouth daily. Historical Provider, MD   metoprolol (TOPROL-XL) 25 MG XL tablet Take 25 mg by mouth daily. Historical Provider, MD       Current medications:    No current facility-administered medications for this visit. No current outpatient medications on file.      Facility-Administered Medications Ordered in Other Visits   Medication Dose Route Frequency Provider Last Rate Last Admin    lactated ringers infusion   Intravenous Continuous Jose Frankel, DO        sodium chloride flush 0.9 % injection 10 mL  10 mL Intravenous 2 times per day Jose Frankel, DO        sodium chloride flush 0.9 % injection 10 mL  10 mL Intravenous PRN Stana Maid, DO        lidocaine PF 1 % injection 1 mL  1 mL Intradermal Once PRN Jose Frankel, DO        ceFAZolin (ANCEF) 2000 mg in dextrose 5 % 50 mL IVPB  2,000 mg Intravenous Once Veldon Gene, DO           Allergies:  No Known Allergies    Problem List:    Patient Active Problem List   Diagnosis Code    Essential hypertension I10    Carotid artery stenosis I65.29    GERD (gastroesophageal reflux disease) K21.9    Coronary artery disease involving native coronary artery of native heart without [facility], [address]    Drug/Infectious Status (If Applicable):  Lab Results   Component Value Date    HEPCAB NONREACTIVE 06/09/2020       COVID-19 Screening (If Applicable):   Lab Results   Component Value Date    COVID19 Not Detected 03/05/2021    COVID19 Not Detected 10/22/2020         Anesthesia Evaluation   no history of anesthetic complications:   Airway: Mallampati: II  TM distance: >3 FB   Neck ROM: full  Mouth opening: > = 3 FB Dental:    (+) upper dentures and lower dentures      Pulmonary:normal exam    (+) COPD:                             Cardiovascular:    (+) hypertension:, CAD:, CABG/stent:, hyperlipidemia    (-)  angina       Beta Blocker:  Dose within 24 Hrs      ROS comment: 2020 Result Narrative  Normal left ventricular size with mild to moderately depressed left   ventricular systolic function. Global hypokinesis with very prominent apical trabeculae not entirely   diagnostic for non compaction  Dilated left atrium. Moderate aortic insufficiency. Mild mitral regurgitation. 2020 stress:   Perfusion Defect: There is a left ventricular function defect that is   large in size present in the inferolateral location(s) that is   non-reversible.  Consistent with old infarct.  No ischemia seen.  And this   is consistent with previous history    Stress Function Comments: Left ventricular function post-stress is   abnormal. Global function is mildly reduced. Post-stress ejection fraction   is calculated at 59 %.  Visually is around 45%. There was a single   regional abnormality during stress.  Inferolateral hypokinesis         Neuro/Psych:                ROS comment: Bilateral carotid artery stenosis GI/Hepatic/Renal:   (+) GERD: well controlled,           Endo/Other:                     Abdominal:           Vascular:   + PVD, aortic or cerebral, . ROS comment: Symptomatic carotid artery stenosis. Anesthesia Plan      general     ASA 4       Induction: intravenous.

## 2021-03-10 NOTE — FLOWSHEET NOTE
Pt admitted to room 2032 post R-CEA. Oriented to room, call light and bed mechanics. Side rails up x2. Call light within reach. Orders reviewed. IVF infusing as ordered, pt denies need for pain meds at this time. RIght CEA dressing clean,dry,intact.

## 2021-03-11 VITALS
SYSTOLIC BLOOD PRESSURE: 104 MMHG | DIASTOLIC BLOOD PRESSURE: 71 MMHG | BODY MASS INDEX: 29.63 KG/M2 | TEMPERATURE: 98.4 F | OXYGEN SATURATION: 93 % | WEIGHT: 207 LBS | HEART RATE: 52 BPM | RESPIRATION RATE: 18 BRPM | HEIGHT: 70 IN

## 2021-03-11 LAB
ANION GAP SERPL CALCULATED.3IONS-SCNC: 10 MMOL/L (ref 9–17)
BUN BLDV-MCNC: 20 MG/DL (ref 8–23)
BUN/CREAT BLD: 19 (ref 9–20)
CALCIUM SERPL-MCNC: 8.6 MG/DL (ref 8.6–10.4)
CHLORIDE BLD-SCNC: 105 MMOL/L (ref 98–107)
CO2: 24 MMOL/L (ref 20–31)
CREAT SERPL-MCNC: 1.04 MG/DL (ref 0.7–1.2)
GFR AFRICAN AMERICAN: >60 ML/MIN
GFR NON-AFRICAN AMERICAN: >60 ML/MIN
GFR SERPL CREATININE-BSD FRML MDRD: ABNORMAL ML/MIN/{1.73_M2}
GFR SERPL CREATININE-BSD FRML MDRD: ABNORMAL ML/MIN/{1.73_M2}
GLUCOSE BLD-MCNC: 143 MG/DL (ref 70–99)
HCT VFR BLD CALC: 33.6 % (ref 40.7–50.3)
HEMOGLOBIN: 10.3 G/DL (ref 13–17)
MAGNESIUM: 1.8 MG/DL (ref 1.6–2.6)
MCH RBC QN AUTO: 29.2 PG (ref 25.2–33.5)
MCHC RBC AUTO-ENTMCNC: 30.7 G/DL (ref 28.4–34.8)
MCV RBC AUTO: 95.2 FL (ref 82.6–102.9)
NRBC AUTOMATED: 0 PER 100 WBC
PDW BLD-RTO: 13.1 % (ref 11.8–14.4)
PHOSPHORUS: 4 MG/DL (ref 2.5–4.5)
PLATELET # BLD: 106 K/UL (ref 138–453)
PMV BLD AUTO: 11.1 FL (ref 8.1–13.5)
POTASSIUM SERPL-SCNC: 4.3 MMOL/L (ref 3.7–5.3)
RBC # BLD: 3.53 M/UL (ref 4.21–5.77)
SODIUM BLD-SCNC: 139 MMOL/L (ref 135–144)
SURGICAL PATHOLOGY REPORT: NORMAL
WBC # BLD: 22.5 K/UL (ref 3.5–11.3)

## 2021-03-11 PROCEDURE — 83735 ASSAY OF MAGNESIUM: CPT

## 2021-03-11 PROCEDURE — 36415 COLL VENOUS BLD VENIPUNCTURE: CPT

## 2021-03-11 PROCEDURE — 6370000000 HC RX 637 (ALT 250 FOR IP): Performed by: INTERNAL MEDICINE

## 2021-03-11 PROCEDURE — 80048 BASIC METABOLIC PNL TOTAL CA: CPT

## 2021-03-11 PROCEDURE — 2580000003 HC RX 258: Performed by: SURGERY

## 2021-03-11 PROCEDURE — 6360000002 HC RX W HCPCS: Performed by: SURGERY

## 2021-03-11 PROCEDURE — 84100 ASSAY OF PHOSPHORUS: CPT

## 2021-03-11 PROCEDURE — 99232 SBSQ HOSP IP/OBS MODERATE 35: CPT | Performed by: INTERNAL MEDICINE

## 2021-03-11 PROCEDURE — 85027 COMPLETE CBC AUTOMATED: CPT

## 2021-03-11 PROCEDURE — 6370000000 HC RX 637 (ALT 250 FOR IP): Performed by: SURGERY

## 2021-03-11 RX ORDER — CLOPIDOGREL BISULFATE 75 MG/1
75 TABLET ORAL DAILY
Qty: 30 TABLET | Refills: 11 | Status: SHIPPED | OUTPATIENT
Start: 2021-03-11

## 2021-03-11 RX ADMIN — Medication 100 MG: at 07:35

## 2021-03-11 RX ADMIN — ASPIRIN 325 MG: 325 TABLET, COATED ORAL at 07:36

## 2021-03-11 RX ADMIN — CEFAZOLIN SODIUM 2000 MG: 10 INJECTION, POWDER, FOR SOLUTION INTRAVENOUS at 01:39

## 2021-03-11 RX ADMIN — ENOXAPARIN SODIUM 40 MG: 40 INJECTION SUBCUTANEOUS at 07:39

## 2021-03-11 RX ADMIN — LISINOPRIL 10 MG: 10 TABLET ORAL at 07:35

## 2021-03-11 RX ADMIN — PANTOPRAZOLE SODIUM 40 MG: 40 TABLET, DELAYED RELEASE ORAL at 07:35

## 2021-03-11 ASSESSMENT — PAIN SCALES - GENERAL
PAINLEVEL_OUTOF10: 0
PAINLEVEL_OUTOF10: 0

## 2021-03-11 NOTE — FLOWSHEET NOTE
RN contacted daughter of pt via telephone to update on discharge status. Pts daughter states that she does not want to be present for D/C instructions and will pick him up at the front entrance.

## 2021-03-11 NOTE — FLOWSHEET NOTE
Dr. Maria L Colindres at pt bedside. Reviewed case with pt and updated via RN. Surgical dressing removed from right neck via Dr. Maria L Colindres, site remains WAQAS, surgical glue clean, dry, and intact. No redness, bruising, or hematoma. Minimal swelling noted. Pt to follow up in 3-4 weeks. Pt cleared for discharge from vascular standpoint.

## 2021-03-11 NOTE — FLOWSHEET NOTE
Patient discharged with all belongings to main entrance via wheelchair, accompanied by RN. Patient's daughter arrived to transport patient home.

## 2021-03-11 NOTE — ACP (ADVANCE CARE PLANNING)
Advance Care Planning     Advance Care Planning Activator (Inpatient)  Conversation Note      Date of ACP Conversation:  03/11/2021    Conversation Conducted with: Patient with Decision Making Capacity    ACP Activator: [address] Decision Maker:     Current Designated Health Care Decision Maker:     Patient has POA paperwork and living will at home. Patient states taht the POA is daughter Crystal Hamm   [phone]    Care Preferences    Ventilation: \"If you were in your present state of health and suddenly became very ill and were unable to breathe on your own, what would your preference be about the use of a ventilator (breathing machine) if it were available to you? \"      Would the patient desire the use of ventilator (breathing machine)?: no    \"If your health worsens and it becomes clear that your chance of recovery is unlikely, what would your preference be about the use of a ventilator (breathing machine) if it were available to you? \"     Would the patient desire the use of ventilator (breathing machine)?: No      Resuscitation  \"CPR works best to restart the heart when there is a sudden event, like a heart attack, in someone who is otherwise healthy. Unfortunately, CPR does not typically restart the heart for people who have serious health conditions or who are very sick. \"    \"In the event your heart stopped as a result of an underlying serious health condition, would you want attempts to be made to restart your heart (answer \"yes\" for attempt to resuscitate) or would you prefer a natural death (answer \"no\" for do not attempt to resuscitate)? \" no       [x] Yes   [] No   Educated Patient / Badin Mourning regarding differences between Advance Directives and portable DNR orders.     Length of ACP Conversation in minutes:      Conversation Outcomes:  [x] ACP discussion completed  [] Existing advance directive reviewed with patient; no changes to patient's previously recorded wishes  [] New Advance Directive completed  [] Portable Do Not Rescitate prepared for Provider review and signature  [] POLST/POST/MOLST/MOST prepared for Provider review and signature      Follow-up plan:    [] Schedule follow-up conversation to continue planning  [] Referred individual to Provider for additional questions/concerns   [] Advised patient/agent/surrogate to review completed ACP document and update if needed with changes in condition, patient preferences or care setting    [] This note routed to one or more involved healthcare providers

## 2021-03-11 NOTE — CARE COORDINATION
Case Management Initial Discharge Plan  Alex Oliver,             Met with:patient to discuss discharge plans. Information verified: address, contacts, phone number, , insurance Yes    Emergency Contact/Next of Kin name & number: April/daughter   [phone]    PCP: LIZETH Das CNP  Date of last visit: 4 months ago. Has follow up scheduled    Insurance Provider: [employer] G[employer]me[employer] medicare    Discharge Planning    Living Arrangements:      Support Systems:       Home has 1 stories  1 stairs to climb to get into front door, 0stairs to climb to reach second floor  Location of bedroom/bathroom in home main    Patient able to perform ADL's:Independent    Current Services (outpatient & in home) none  DME equipment: 0  DME provider: 0    Receiving oral anticoagulation therapy? No    If indicated:   Physician managing anticoagulation treatment:   Where does patient obtain lab work for ATC treatment? Potential Assistance Needed:       Patient agreeable to home care: No  [place] of choice provided:  n/a    Prior SNF/Rehab Placement and Facility: n/a  Agreeable to SNF/Rehab: No  [place] of choice provided: n/a     Evaluation: no    Expected Discharge date:       Patient expects to be discharged to: Follow Up Appointment: Best Day/ Time:      Transportation provider: family  Transportation arrangements needed for discharge: No    Readmission Risk              Risk of Unplanned Readmission:        14             Does patient have a readmission risk score greater than 14?: No  If yes, follow-up appointment must be made within 7 days of discharge. Goals of Care:       Discharge Plan: S/P thromboendartectomy  Patient lives alone in a 1 story home with 1 step to enter.   Declines any skilled needs  Does not use DME  Pharmacy is [facility] [place]  Has transportation home  Follow up appointments made          Electronically signed by Babar Ayala RN on 3/11/21 at 9:48 AM EST

## 2021-03-11 NOTE — FLOWSHEET NOTE
RN reviewed discharge instructions with patient at the bedside. Patient verbalizes understanding of d/c instructions. Awaiting patient's daughter's arrival for discharge home.

## 2021-03-11 NOTE — PROGRESS NOTES
Kenney Cole NP notified of patient's hypotension after first fluid bolus. Patient remains asymptomatic. Order received for additional NS bolus. Will continue to monitor.

## 2021-03-11 NOTE — DISCHARGE SUMMARY
VASCULAR SURGERY   DISCHARGE SUMMARY      Patient Identification  Juanis Barkley is a 80 y.o. male. :  1950  Admit Date:  3/10/2021    Discharge date:   No discharge date for patient encounter. Disposition: home    Discharge Diagnoses:   Patient Active Problem List   Diagnosis    Essential hypertension    Carotid artery stenosis    GERD (gastroesophageal reflux disease)    Coronary artery disease involving native coronary artery of native heart without angina pectoris    Tobacco abuse    Back pain    Pneumonia of right upper lobe due to infectious organism    Acute hypoxemic respiratory failure (HCC)    Normocytic anemia    Mediastinal lymphadenopathy    Hyperglycemia    Thrombocytopenia (HCC)    Human metapneumovirus (hMPV) pneumonia    Suspected chronic obstructive pulmonary disease based on initial evaluation ([facility] [address]    Hyperlipidemia    Recurrent carotid stenosis, left    Carotid stenosis, right    Alcohol use    CLL (chronic lymphocytic leukemia) (HCC)         Consults: IM    Surgery: Right carotid endarterectomy    Patient Instructions: Activity: no heavy lifting, pushing, pulling for 6 weeks, no driving for 2 weeks or while on analgesics  Diet: As tolerated  Follow-up with Chandni Padron MD in 3-4 weeks. See pre-printed instructions in chart and given to patient upon discharge. Discharge Medications:      [place]    Home Medication Instructions O:[id number]    Printed on:21 4210   Medication Information                      aspirin 325 MG EC tablet  Take 0.5 tablets by mouth daily             clopidogrel (PLAVIX) 75 MG tablet  Take 1 tablet by mouth daily             lisinopril (PRINIVIL;ZESTRIL) 10 MG tablet  Take 1 tablet by mouth daily             metoprolol (TOPROL-XL) 25 MG XL tablet  Take 25 mg by mouth daily. Omeprazole 20 MG TBEC  Take 20 tablets by mouth daily.              simvastatin (ZOCOR) 40 MG tablet  Take 40 mg by mouth nightly                   HPI and Hospital Course: 76-year-old male underwent right carotid endarterectomy without incident. Normal postoperative course. Was discharged home on postoperative day #1 in good condition. Plavix 75 mg daily was started and aspirin decreased to 81 mg daily. Follow-up in the office in 3 to 4 weeks.         Darrion Noriega MD FACS

## 2021-03-11 NOTE — PROGRESS NOTES
[facility]  Office: [address], DO, Youngjoleen Salas, DO, Bolivar Saavedras, DO, Marco Antonio Fitzgerald Deneen, DO, Sanaz Rodriguez MD, Mariam Murray MD, Macy Gonzalez MD, Carolina Harrell MD, Ila Aldana MD, Lucinda Jones MD, Christina Zelaya MD, Varun Alonso MD, Damaris Deng MD, Kalia Clemons, DO, Natacha Christine MD, Baljeet Nix DO, Callum Kim MD,  Timothy Lebron DO, Lakhsmi Nelson MD, Jhoana Zepeda MD, Shukri Izquierdo, HER[facility], [place] [address], [facility], Marci Jamison, CNP, Jamarcus Alegre, CNS, Isak George, CNP, Catracho Vazquez, CNP, Juan Antonio Gannon, CNP, Iban Burton, CNP, Chloe Andrea, CNP, Sheeba Dunaway PA-C, Juan Francisco Musa, [facility], Eulalia Childress, CNP, Rashi, CNP, Alberto Hillman, CNP, aDrcy Blue, CNP, Kwasi Waldrop, CNP, Tim Fox, [facility]    Progress Note    3/11/2021    8:11 AM    Name:   Abdias Gold  MRN:     [id number]     Acct:      [de-identified]   Room:   2032/2032-01  IP Day:  1  Admit Date:  3/10/2021  7:37 AM    PCP:   Albina Jaime, LIZETH - CNP  Code Status:  Full Code    Subjective:     C/C: right cea  Interval History Status: improved. Tongue still feels a bit swollen to him but better  No trouble swallowing or from respiratory perspective    Brief History: This 80 yom presents for elective right carotid surgery. Postop doing well, though he feels like his tongue is somewhat swollen but no trouble swallowing or breathing. Review of Systems:     Constitutional:  negative for chills, fevers, sweats  Respiratory:  negative for cough, dyspnea on exertion, shortness of breath, wheezing  Cardiovascular:  negative for chest pain, chest pressure/discomfort, lower extremity edema, palpitations  Gastrointestinal:  negative for abdominal pain, constipation, diarrhea, nausea, vomiting  Neurological:  negative for dizziness, headache    Medications:      Allergies:  No Known Allergies    Current Meds: Scheduled Meds:    pantoprazole  40 mg Oral QAM AC    metoprolol succinate  25 mg Oral Daily    aspirin  325 mg Oral Daily    lisinopril  10 mg Oral Daily    sodium chloride flush  10 mL Intravenous 2 times per day    enoxaparin  40 mg Subcutaneous Daily    thiamine mononitrate  100 mg Oral Daily    atorvastatin  40 mg Oral Nightly     Continuous Infusions:    lactated ringers 125 mL/hr at 03/10/21 1800     PRN Meds: sodium chloride flush, morphine **OR** morphine, acetaminophen    Data:     Past Medical History:   has a past medical history of Arthritis, CAD (coronary artery disease), CLL (chronic lymphocytic leukemia) ([facility] [address] Colon polyps, COPD [facility]ch[facility] obstructive [facility]) [facility] History of heart artery stent, Hyperlipidemia, Lymphoma [facility] MI (myocardial infarction) ([facility] Wears dentures, and Wears eyeglasses. Social History:   reports that he quit smoking about a year ago. His smoking use included cigarettes. He has a 12.00 pack-year smoking history. He has never used smokeless tobacco. He reports current alcohol use of about 21.0 standard drinks of alcohol per week. He reports that he does not use drugs. Family History:   Family History   Problem Relation Age of Onset    Stroke Father         two strokes  at 69    Other Mother          at 80 from unknown causes    Other Brother         alcoholism       Vitals:  /71   Pulse 52   Temp 97.3 °F (36.3 °C) (Tympanic)   Resp 20   Ht 5' 10\" (1.778 m)   Wt 207 lb (93.9 kg)   SpO2 94%   BMI 29.70 kg/m²   Temp (24hrs), Av.4 °F (36.3 °C), Min:96.6 °F (35.9 °C), Max:99 °F (37.2 °C)    No results for input(s): POCGLU in the last 72 hours. I/O (24Hr):     Intake/Output Summary (Last 24 hours) at 3/11/2021 0811  Last data filed at 3/11/2021 0553  Gross per 24 hour   Intake 2796 ml   Output 1300 ml   Net 1496 ml       Labs:  Hematology:  Recent Labs     03/10/21  0816 21  0509   WBC 28.9* 22.5*   RBC 4.74 3.53* HGB 13.9 10.3*   HCT 44.6 33.6*   MCV 94.1 95.2   MCH 29.3 29.2   MCHC 31.2 30.7   RDW 13.0 13.1   * 106*   MPV 11.5 11.1     Chemistry:  Recent Labs     03/11/21  0509      K 4.3      CO2 24   GLUCOSE 143*   BUN 20   CREATININE 1.04   MG 1.8   ANIONGAP 10   LABGLOM >60   GFRAA >60   CALCIUM 8.6   PHOS 4.0   No results for input(s): PROT, LABALBU, LABA1C, T2PIEKU, H2OPSEC, FT4, TSH, AST, ALT, LDH, GGT, ALKPHOS, LABGGT, BILITOT, BILIDIR, AMMONIA, AMYLASE, LIPASE, LACTATE, CHOL, HDL, LDLCHOLESTEROL, CHOLHDLRATIO, TRIG, VLDL, MZH84UE, PHENYTOIN, PHENYF, URICACID, POCGLU in the last 72 hours. ABG:  Lab Results   Component Value Date    POCPH 7.43 03/03/2020    POCPCO2 31 03/03/2020    POCPO2 55 03/03/2020    POCHCO3 20.3 03/03/2020    NBEA 4 03/03/2020    PBEA NOT REPORTED 03/03/2020    BKR4ALN 21 03/03/2020    SYVL7OTS 89 03/03/2020    FIO2 50.0 03/03/2020     Lab Results   Component Value Date/Time    SPECIAL NOT REPORTED 03/06/2020 04:29 AM     Lab Results   Component Value Date/Time    CULTURE NORMAL RESPIRATORY LUC MODERATE GROWTH 03/06/2020 04:29 AM       Radiology:  Xr Chest Portable    Result Date: 3/10/2021  Stable mild cardiomegaly. Mild pulmonary vascular congestion.        Physical Examination:        General appearance:  alert, cooperative and no distress  Mental Status:  oriented to person, place and time and normal affect  Lungs:  clear to auscultation bilaterally, normal effort  Heart:  regular rate and rhythm, no murmur  Abdomen:  soft, nontender, nondistended, normal bowel sounds, no masses, hepatomegaly, splenomegaly  Extremities:  no edema, redness, tenderness in the calves  Skin:  no gross lesions, rashes, induration    Assessment:        Hospital Problems           Last Modified POA    * (Principal) Carotid artery stenosis 3/10/2021 Yes    Essential hypertension 3/10/2021 Yes    Coronary artery disease involving native coronary artery of native heart without angina pectoris 3/10/2021 Yes    Thrombocytopenia ([facility] [address] 3/10/2021 Yes    Carotid stenosis, right 3/10/2021 Yes    Alcohol use 3/10/2021 Yes    CLL (chronic lymphocytic leukemia) ([facility] [address] 3/11/2021 Yes          Plan:        1. D/w Dr Ramirez Schultz  2. elevated wbc due to CLL, has seen hematology in past  3.  Ok to [place] home per IM    [employer] Ron Blood, DO  3/11/2021  8:11 AM

## 2021-03-11 NOTE — OP NOTE
[address] [facility] [address]                [address], [address] [place] [address]                                OPERATIVE REPORT    PATIENT NAME: Mindy Lala                   :        1950  MED REC NO:   [id number]                             ROOM:         ACCOUNT NO:   [de-identified]                           ADMIT DATE: 03/10/2021  PROVIDER:     Alf Mccartney MD    DATE OF PROCEDURE:  03/10/2021    PREOPERATIVE DIAGNOSIS:  Severe 80% to 85% right internal carotid artery  stenosis. POSTOPERATIVE DIAGNOSIS:  Critical 99% right internal carotid artery  stenosis. OPERATION PERFORMED:  1. Right carotid endarterectomy with Vascu-Guard patch. 2.  Open dilatation of right internal carotid artery. SURGEON:  Randa Finnegan. Vipin Hoffman MD    ANESTHESIA:  General endotracheal.    ESTIMATED BLOOD LOSS:  50 mL. COMPLICATION:  None. INDICATIONS:  The patient is a 69-year-old male who previously underwent  left carotid endarterectomy without incident. He was also noted to have  severe right carotid stenosis. At this time, he is being taken to the  operating room for an elective right carotid endarterectomy. Intraoperatively, he was noted to have what appeared to be acute  thrombus in the internal carotid with at least 99% stenosis and minimal  backbleeding from the internal carotid suggesting a distal intracranial  stenosis. OPERATIVE TECHNIQUE:  After informed consent had been obtained, the  patient was brought to the operating room, placed in the supine  position, and general endotracheal anesthesia was induced. The right  neck was then prepped and draped in the usual sterile fashion. An  oblique incision was then made in the right neck and the subcutaneous  tissues were sharply taken down. Bleeding points being controlled with  electrocautery. The platysma was divided and the anterior border of the  sternocleidomastoid was dissected free.   Internal jugular vein was  identified and there were multiple small branches which were ligated  with 3-0 Vicryl and divided. Carotid bifurcation was isolated and the  common internal and external carotid arteries were circumferentially  dissected and vessels placed around them. The superior thyroid artery  was secured with 0 silk Elena tie. At this point, the patient was  intravenously heparinized with 9000 units of aqueous heparin. External  carotid artery was then clamped with Luz Marina followed by the internal  carotid artery. Common carotid artery was clamped with a White clamp. Longitudinal arteriotomy was then made on the common carotids and  extended down to the internal carotid artery. There was acute thrombus  noted within the proximal internal carotid near the area of stenosis  with near obliteration of the lumen. The distal aspect was  endarterectomized in a standard fashion with good taper points; however,  minimal backbleeding was noted. Vessels were irrigated with heparinized  saline, and 3 x 5 mm Sundt shunt was placed and the ends secured with  Alli clamps. Flow was reestablished to the brain. It was slow within  the shunt; however, there did appear to be somewhat high resistance  signal.  The remainder of the bifurcation was then endarterectomized in  standard fashion with good distal taper points. The endarterectomized  surface was irrigated with heparinized saline and small debris fragments  were removed. A Vascu-Guard patch was brought in the field and  fashioned. The arteriotomy was closed in a patch angioplasty fashion  using running 6-0 Prolene suture. Prior to completing the closure, the  shunt was clamped and removed. Nino dilators were advanced up to 4  mm with improvement of backbleeding. Vessels were irrigated with  heparinized saline and the closure completed. At this time, flow was  reestablished first to the external carotid artery followed by the  internal carotid artery.   Thrombin and Gelfoam was placed at the closure  site. Doppler was used to interrogate both the internal and external  carotid arteries and flow was noted within the internal carotid,  however, did appear to have somewhat of a high resistance signal  distally. Wound was then irrigated with Irrisept solution and the  Gelfoam removed. Suture line was inspected and found to be hemostatic. Further irrigation was carried out and the platysma was reapproximated  using running 3-0 Vicryl suture followed by closure of skin with  interrupted 4-0 Monocryl subcuticular suture. Dermabond was applied  followed by a sterile dressing. The patient tolerated the procedure  well and was awakened in the operating room, moving all four  extremities, with his tongue in midline. At this point, he was  transferred to the recovery room in satisfactory condition. Sponge,  instrument, and needle counts were correct at the conclusion of the  operation.         Angelica Bhakta MD    D: 03/10/2021 13:11:04       T: 03/10/2021 15:09:10     [id number]  Job#: [id number]     Doc#: [id number]    CC:  MD Yosi López APRN - CNP

## 2021-03-11 NOTE — PROGRESS NOTES
Kim Navas NP notified of patient's hypotension. Patient remains asymptomatic. Order received for NS bolus. Will continue to monitor.

## 2021-03-11 NOTE — PLAN OF CARE
Problem: Infection:  Goal: Will remain free from infection  Description: Will remain free from infection  3/11/2021 0337 by Dorina Cuevas RN  Outcome: Ongoing  3/10/2021 1757 by Heidi Bhatia RN  Outcome: Ongoing     Problem: Safety:  Goal: Free from accidental physical injury  Description: Free from accidental physical injury  3/11/2021 0337 by Dorina Cuevas RN  Outcome: Ongoing  3/10/2021 1757 by Heidi Bhatia RN  Outcome: Ongoing  Goal: Free from intentional harm  Description: Free from intentional harm  3/11/2021 0337 by Dorina Cuevas RN  Outcome: Ongoing  3/10/2021 1757 by Heidi Bhatia RN  Outcome: Ongoing     Problem: Daily Care:  Goal: Daily care needs are met  Description: Daily care needs are met  3/11/2021 0337 by Dorina Cuevas RN  Outcome: Ongoing  3/10/2021 1757 by Heidi Bhatia RN  Outcome: Ongoing     Problem: Pain:  Goal: Patient's pain/discomfort is manageable  Description: Patient's pain/discomfort is manageable  3/11/2021 0337 by Dorina Cuevas RN  Outcome: Ongoing  3/10/2021 1757 by Heidi Bhatia RN  Outcome: Ongoing     Problem: Skin Integrity:  Goal: Skin integrity will stabilize  Description: Skin integrity will stabilize  3/11/2021 0337 by Dorina Cuevas RN  Outcome: Ongoing  3/10/2021 1757 by Heidi Bhatia RN  Outcome: Ongoing     Problem: Discharge Planning:  Goal: Patients continuum of care needs are met  Description: Patients continuum of care needs are met  3/11/2021 0337 by Dorina Cuevas RN  Outcome: Ongoing  3/10/2021 1757 by Heidi Bhatia RN  Outcome: Ongoing

## 2021-03-11 NOTE — PROGRESS NOTES
VASCULAR SURGERY  PROGRESS NOTE  POST-OP CAROTID ENDARTERECTOMY    3/11/2021  8:03 AM     Amilcar Rao    1950   [id number]        SUBJECTIVE:  Patient awake and alert. No complaints. Good pain control. Denies nausea. OBJECTIVE    Physical  VITALS:  /71   Pulse 52   Temp 97.3 °F (36.3 °C) (Tympanic)   Resp 20   Ht 5' 10\" (1.778 m)   Wt 207 lb (93.9 kg)   SpO2 94%   BMI 29.70 kg/m²     CONSTITUTIONAL:  awake, alert, cooperative, no apparent distress and appears stated age  NECK: Incision clean and dry with no unexpected swelling. NEUROLOGIC:  Mental status unchanged. Motor and sensory function intact. Tongue midline. Data  Hemoglobin   Date/Time Value Ref Range Status   03/11/2021 05:09 AM 10.3 (L) 13.0 - 17.0 g/dL Final     Hematocrit   Date/Time Value Ref Range Status   03/11/2021 05:09 AM 33.6 (L) 40.7 - 50.3 % Final     Sodium   Date/Time Value Ref Range Status   03/11/2021 05:09  135 - 144 mmol/L Final     Potassium   Date/Time Value Ref Range Status   03/11/2021 05:09 AM 4.3 3.7 - 5.3 mmol/L Final     Chloride   Date/Time Value Ref Range Status   03/11/2021 05:09  98 - 107 mmol/L Final     CO2   Date/Time Value Ref Range Status   03/11/2021 05:09 AM 24 20 - 31 mmol/L Final     BUN   Date/Time Value Ref Range Status   03/11/2021 05:09 AM 20 8 - 23 mg/dL Final       ASSESSMENT AND PLAN    80 y.o. male doing well status post Right Carotid Endarterectomy       Plan home today. Follow-up in Office. Continue aspirin. Start Plavix. Call for problems.     Electronically signed by Dang Wright MD on 3/11/2021 at 8:03 AM

## 2021-03-12 ENCOUNTER — TELEPHONE (OUTPATIENT)
Dept: FAMILY MEDICINE CLINIC | Age: 71
End: 2021-03-12

## 2021-03-12 ENCOUNTER — IMMUNIZATION (OUTPATIENT)
Dept: FAMILY MEDICINE CLINIC | Age: 71
End: 2021-03-12
Payer: MEDICARE

## 2021-03-12 PROCEDURE — 0012A COVID-19, MODERNA VACCINE 100MCG/0.5ML DOSE: CPT | Performed by: INTERNAL MEDICINE

## 2021-03-12 PROCEDURE — 91301 COVID-19, MODERNA VACCINE 100MCG/0.5ML DOSE: CPT | Performed by: INTERNAL MEDICINE

## 2021-03-12 NOTE — TELEPHONE ENCOUNTER
[facility] Trans[facility] Initial Follow Up Call    Outreach made within 2 business days of discharge: Yes    Patient: Juanis Barkley Patient : 1950   MRN: [id number]  Reason for Admission: There are no discharge diagnoses documented for the most recent discharge.   Discharge Date: 3/11/21       Spoke with: Talk to patient and he will call back if need a jill     Discharge department/facility:         Scheduled appointment with PCP within 7-14 days    Follow Up  Future Appointments   Date Time Provider Aviva Gordillo   3/12/2021 11:40 AM [facility], [facility]   3/23/2021  4:45 PM Hernesto Garcia APRN - CNP [facility] [address], [place]

## 2021-03-23 ENCOUNTER — OFFICE VISIT (OUTPATIENT)
Dept: FAMILY MEDICINE CLINIC | Age: 71
End: 2021-03-23
Payer: MEDICARE

## 2021-03-23 VITALS
HEART RATE: 58 BPM | WEIGHT: 207 LBS | DIASTOLIC BLOOD PRESSURE: 76 MMHG | SYSTOLIC BLOOD PRESSURE: 124 MMHG | BODY MASS INDEX: 29.7 KG/M2 | OXYGEN SATURATION: 97 % | TEMPERATURE: 97.9 F

## 2021-03-23 DIAGNOSIS — I65.21 STENOSIS OF RIGHT CAROTID ARTERY: Primary | ICD-10-CM

## 2021-03-23 DIAGNOSIS — Z09 HOSPITAL DISCHARGE FOLLOW-UP: ICD-10-CM

## 2021-03-23 DIAGNOSIS — R73.9 HYPERGLYCEMIA: ICD-10-CM

## 2021-03-23 LAB — HBA1C MFR BLD: 6.5 %

## 2021-03-23 PROCEDURE — 99495 TRANSJ CARE MGMT MOD F2F 14D: CPT | Performed by: NURSE PRACTITIONER

## 2021-03-23 PROCEDURE — 1111F DSCHRG MED/CURRENT MED MERGE: CPT | Performed by: NURSE PRACTITIONER

## 2021-03-23 PROCEDURE — 83036 HEMOGLOBIN GLYCOSYLATED A1C: CPT | Performed by: NURSE PRACTITIONER

## 2021-03-23 ASSESSMENT — PATIENT HEALTH QUESTIONNAIRE - PHQ9: SUM OF ALL RESPONSES TO PHQ QUESTIONS 1-9: 1

## 2021-03-23 NOTE — PROGRESS NOTES
Post-Discharge Transitional Care Management Services or Hospital Follow Up      Eyad Gao   YOB: 1950    Date of Office Visit:  3/23/2021  Date of Hospital Admission: 03/10/2021  Trace Rivera  Date of Hospital Discharge: 03/11/2021    Care management risk score Rising risk (score 2-5) and Complex Care (Scores >=6): 0     Non face to face  following discharge, date last encounter closed (first attempt may have been earlier): 3/12/2021 10:42 AM 3/12/2021 10:42 AM    Call initiated 2 business days of discharge: Yes    Patient Active Problem List   Diagnosis    Essential hypertension    Carotid artery stenosis    GERD (gastroesophageal reflux disease)    Coronary artery disease involving native coronary artery of native heart without angina pectoris    Tobacco abuse    Back pain    Pneumonia of right upper lobe due to infectious organism    Acute hypoxemic respiratory failure (HCC)    Normocytic anemia    Mediastinal lymphadenopathy    Hyperglycemia    Thrombocytopenia (HCC)    Human metapneumovirus (hMPV) pneumonia    Suspected chronic obstructive pulmonary disease based on initial evaluation ([facility]    Hyperlipidemia    Recurrent carotid stenosis, left    Carotid stenosis, right    Alcohol use    CLL (chronic lymphocytic leukemia) ([facility]       No Known Allergies    Medications listed as ordered at the time of discharge from hospital  Reconciled with current medications    Medications marked \"taking\" at this time  Outpatient Medications Marked as Taking for the 3/23/21 encounter (Office Visit) with LIZETH Almonte CNP   Medication Sig Dispense Refill    aspirin 325 MG EC tablet Take 0.5 tablets by mouth daily 30 tablet 12    clopidogrel (PLAVIX) 75 MG tablet Take 1 tablet by mouth daily 30 tablet 11    lisinopril (PRINIVIL;ZESTRIL) 10 MG tablet Take 1 tablet by mouth daily 90 tablet 1    simvastatin (ZOCOR) 40 MG tablet Take 40 mg by mouth nightly       Omeprazole 20 MG TBEC Take 20 tablets by mouth daily.  metoprolol (TOPROL-XL) 25 MG XL tablet Take 25 mg by mouth daily. Medications patient taking as of now reconciled against medications ordered at time of hospital discharge: Yes    Chief Complaint   Patient presents with   [address] from Hospital       History of Present illness - Follow up of Hospital diagnosis(es): right carotid artery stenosis    Inpatient course: Discharge summary reviewed- see chart. Interval history/Current status: patient underwent right carotid endarterectomy on 3/10/2021. Procedure was reportedly successful and uneventful. Patient was discharged in stable condition. Currently, patient reports mild discomfort to the right neck, denies fever, chills, redness or drainage to the incision site. He is scheduled for f/u with surgeon 4/5/2021    Vitals:    03/23/21 1543   BP: 124/76   Pulse: 58   Temp: 97.9 °F (36.6 °C)   TempSrc: Temporal   SpO2: 97%   Weight: 207 lb (93.9 kg)     Body mass index is 29.7 kg/m². Wt Readings from Last 3 Encounters:   03/23/21 207 lb (93.9 kg)   03/10/21 207 lb (93.9 kg)   03/04/21 207 lb 3.7 oz (94 kg)     BP Readings from Last 3 Encounters:   03/23/21 124/76   03/11/21 104/71   03/10/21 (!) 89/52       A comprehensive review of systems was negative except for what was noted in the HPI. Physical Exam:  · Constitutional: Lauren Ramon is oriented to person, place, and time. Vital signs are normal. Appears well-developed and well-nourished. · HEENT:   · Head: Normocephalic and atraumatic. Right Ear: Hearing and external ear normal.   Left Ear: Hearing and external ear normal.   · Nose:  Nares normal. Septum midline. Mucosa normal. No drainage or sinus tenderness. · Mouth/Throat: Oropharynx-no erythema, no exudate. Uvula midline, no erythema, no edema. Mucous membranes are pink and moist.   · Eyes:PERRL, EOMI, Conjunctiva normal, No discharge.     · Neck: Trachea normal, full passive range of motion. Non-tender on palpation. Neck supple. No thyromegaly present. · Cardiovascular: Normal rate, regular rhythm, S1, S2, no murmur, no gallop, no friction rub, intact distal pulses. · Pulmonary/Chest: Breath sounds are clear throughout, No respiratory distress, No wheezing, No chest tenderness. Effort normal  · Abdominal: Soft. Normal appearance, bowel sounds are present and normoactive. There is no hepatosplenomegaly. There is no tenderness. There is no CVA tenderness. · Musculoskeletal: Extremities appear regular and symmetric. No evident masses, lesions, foreign bodies, or other abnormalities. No edema. No tenderness on palpation. Joints are stable. Full ROM, strength and tone are within normal limits. · Lymphadenopathy: No lymphadenopathy noted. · Neurological: Alert and oriented to person, place, and time. Normal motor function, Normal sensory function, No focal deficits noted. He has normal strength. Skin: Skin is warm, dry and intact. Physical Exam  Neck:       ·   · Psychiatric: Normal mood and affect. Speech is normal and behavior is normal.       Nursing note and vitals reviewed. Blood pressure 124/76, pulse 58, temperature 97.9 °F (36.6 °C), temperature source Temporal, weight 207 lb (93.9 kg), SpO2 97 %. Body mass index is 29.7 kg/m². Wt Readings from Last 3 Encounters:   03/23/21 207 lb (93.9 kg)   03/10/21 207 lb (93.9 kg)   03/04/21 207 lb 3.7 oz (94 kg)     BP Readings from Last 3 Encounters:   03/23/21 124/76   03/11/21 104/71   03/10/21 (!) 89/52       Results for POC orders placed in visit on 03/23/21   POCT glycosylated hemoglobin (Hb A1C)   Result Value Ref Range    Hemoglobin A1C 6.5 %         Assessment/Plan:  1. Stenosis of right carotid artery  - successful endarterectomy without complication - incision healing well  - f/u and tx plan per vascular surgery  - SC DISCHARGE MEDS RECONCILED W/ CURRENT OUTPATIENT MED LIST    2.  Hyperglycemia  - controlled  - POCT glycosylated hemoglobin (Hb A1C)    3.  Hospital discharge follow-up        Medical Decision Making: moderate complexity

## 2021-05-20 DIAGNOSIS — I10 ESSENTIAL HYPERTENSION: ICD-10-CM

## 2021-05-20 RX ORDER — LISINOPRIL 10 MG/1
TABLET ORAL
Qty: 90 TABLET | Refills: 1 | Status: SHIPPED | OUTPATIENT
Start: 2021-05-20 | End: 2021-12-09 | Stop reason: SDUPTHER

## 2021-06-24 ENCOUNTER — OFFICE VISIT (OUTPATIENT)
Dept: FAMILY MEDICINE CLINIC | Age: 71
End: 2021-06-24
Payer: MEDICARE

## 2021-06-24 ENCOUNTER — HOSPITAL ENCOUNTER (OUTPATIENT)
Age: 71
Setting detail: SPECIMEN
Discharge: HOME | End: 2021-06-24
Payer: MEDICARE

## 2021-06-24 VITALS
SYSTOLIC BLOOD PRESSURE: 142 MMHG | OXYGEN SATURATION: 97 % | BODY MASS INDEX: 29.39 KG/M2 | HEART RATE: 58 BPM | TEMPERATURE: 97.7 F | WEIGHT: 204.8 LBS | DIASTOLIC BLOOD PRESSURE: 72 MMHG

## 2021-06-24 DIAGNOSIS — I10 ESSENTIAL HYPERTENSION: ICD-10-CM

## 2021-06-24 DIAGNOSIS — Z23 IMMUNIZATION DUE: ICD-10-CM

## 2021-06-24 DIAGNOSIS — R73.9 HYPERGLYCEMIA: ICD-10-CM

## 2021-06-24 DIAGNOSIS — N52.2 DRUG-INDUCED ERECTILE DYSFUNCTION: ICD-10-CM

## 2021-06-24 DIAGNOSIS — I10 ESSENTIAL HYPERTENSION: Primary | ICD-10-CM

## 2021-06-24 DIAGNOSIS — E78.5 HYPERLIPIDEMIA, UNSPECIFIED HYPERLIPIDEMIA TYPE: ICD-10-CM

## 2021-06-24 PROBLEM — J12.3 HUMAN METAPNEUMOVIRUS (HMPV) PNEUMONIA: Status: RESOLVED | Noted: 2020-03-05 | Resolved: 2021-06-24

## 2021-06-24 PROBLEM — J18.9 PNEUMONIA OF RIGHT UPPER LOBE DUE TO INFECTIOUS ORGANISM: Status: RESOLVED | Noted: 2020-03-03 | Resolved: 2021-06-24

## 2021-06-24 PROBLEM — D64.9 NORMOCYTIC ANEMIA: Status: RESOLVED | Noted: 2020-03-04 | Resolved: 2021-06-24

## 2021-06-24 PROBLEM — J44.9 SUSPECTED CHRONIC OBSTRUCTIVE PULMONARY DISEASE BASED ON INITIAL EVALUATION (HCC): Status: RESOLVED | Noted: 2020-03-07 | Resolved: 2021-06-24

## 2021-06-24 PROBLEM — J96.01 ACUTE HYPOXEMIC RESPIRATORY FAILURE (HCC): Status: RESOLVED | Noted: 2020-03-04 | Resolved: 2021-06-24

## 2021-06-24 PROBLEM — D69.6 THROMBOCYTOPENIA (HCC): Status: RESOLVED | Noted: 2020-03-04 | Resolved: 2021-06-24

## 2021-06-24 LAB
ABSOLUTE EOS #: 0 K/UL (ref 0–0.4)
ABSOLUTE IMMATURE GRANULOCYTE: 0 K/UL (ref 0–0.3)
ABSOLUTE LYMPH #: 14.44 K/UL (ref 1–4.8)
ABSOLUTE MONO #: 0.52 K/UL (ref 0.1–0.8)
ALBUMIN SERPL-MCNC: 4.6 G/DL (ref 3.5–5.2)
ALBUMIN/GLOBULIN RATIO: 2.3 (ref 1–2.5)
ALP BLD-CCNC: 73 U/L (ref 40–129)
ALT SERPL-CCNC: 24 U/L (ref 5–41)
ANION GAP SERPL CALCULATED.3IONS-SCNC: 12 MMOL/L (ref 9–17)
AST SERPL-CCNC: 26 U/L
BASOPHILS # BLD: 0 % (ref 0–2)
BASOPHILS ABSOLUTE: 0 K/UL (ref 0–0.2)
BILIRUB SERPL-MCNC: 0.54 MG/DL (ref 0.3–1.2)
BUN BLDV-MCNC: 13 MG/DL (ref 8–23)
BUN/CREAT BLD: ABNORMAL (ref 9–20)
CALCIUM SERPL-MCNC: 9.4 MG/DL (ref 8.6–10.4)
CHLORIDE BLD-SCNC: 105 MMOL/L (ref 98–107)
CHOLESTEROL/HDL RATIO: 3.3
CHOLESTEROL: 149 MG/DL
CO2: 26 MMOL/L (ref 20–31)
CREAT SERPL-MCNC: 1.09 MG/DL (ref 0.7–1.2)
DIFFERENTIAL TYPE: ABNORMAL
EOSINOPHILS RELATIVE PERCENT: 0 % (ref 1–4)
GFR AFRICAN AMERICAN: >60 ML/MIN
GFR NON-AFRICAN AMERICAN: >60 ML/MIN
GFR SERPL CREATININE-BSD FRML MDRD: ABNORMAL ML/MIN/{1.73_M2}
GFR SERPL CREATININE-BSD FRML MDRD: ABNORMAL ML/MIN/{1.73_M2}
GLUCOSE BLD-MCNC: 112 MG/DL (ref 70–99)
HBA1C MFR BLD: 6 %
HCT VFR BLD CALC: 43.6 % (ref 40.7–50.3)
HDLC SERPL-MCNC: 45 MG/DL
HEMOGLOBIN: 13 G/DL (ref 13–17)
IMMATURE GRANULOCYTES: 0 %
LDL CHOLESTEROL: 74 MG/DL (ref 0–130)
LYMPHOCYTES # BLD: 84 % (ref 24–44)
MCH RBC QN AUTO: 28.4 PG (ref 25.2–33.5)
MCHC RBC AUTO-ENTMCNC: 29.8 G/DL (ref 28.4–34.8)
MCV RBC AUTO: 95.4 FL (ref 82.6–102.9)
MONOCYTES # BLD: 3 % (ref 1–7)
MORPHOLOGY: NORMAL
NRBC AUTOMATED: 0 PER 100 WBC
PDW BLD-RTO: 13.8 % (ref 11.8–14.4)
PLATELET # BLD: 117 K/UL (ref 138–453)
PLATELET ESTIMATE: ABNORMAL
PMV BLD AUTO: 11.4 FL (ref 8.1–13.5)
POTASSIUM SERPL-SCNC: 5 MMOL/L (ref 3.7–5.3)
RBC # BLD: 4.57 M/UL (ref 4.21–5.77)
RBC # BLD: ABNORMAL 10*6/UL
SEG NEUTROPHILS: 13 % (ref 36–66)
SEGMENTED NEUTROPHILS ABSOLUTE COUNT: 2.24 K/UL (ref 1.8–7.7)
SODIUM BLD-SCNC: 143 MMOL/L (ref 135–144)
TOTAL PROTEIN: 6.6 G/DL (ref 6.4–8.3)
TRIGL SERPL-MCNC: 150 MG/DL
VLDLC SERPL CALC-MCNC: ABNORMAL MG/DL (ref 1–30)
WBC # BLD: 17.2 K/UL (ref 3.5–11.3)
WBC # BLD: ABNORMAL 10*3/UL

## 2021-06-24 PROCEDURE — 1123F ACP DISCUSS/DSCN MKR DOCD: CPT | Performed by: NURSE PRACTITIONER

## 2021-06-24 PROCEDURE — G0009 ADMIN PNEUMOCOCCAL VACCINE: HCPCS | Performed by: NURSE PRACTITIONER

## 2021-06-24 PROCEDURE — 82044 UR ALBUMIN SEMIQUANTITATIVE: CPT | Performed by: NURSE PRACTITIONER

## 2021-06-24 PROCEDURE — 3017F COLORECTAL CA SCREEN DOC REV: CPT | Performed by: NURSE PRACTITIONER

## 2021-06-24 PROCEDURE — 4040F PNEUMOC VAC/ADMIN/RCVD: CPT | Performed by: NURSE PRACTITIONER

## 2021-06-24 PROCEDURE — 1036F TOBACCO NON-USER: CPT | Performed by: NURSE PRACTITIONER

## 2021-06-24 PROCEDURE — G8427 DOCREV CUR MEDS BY ELIG CLIN: HCPCS | Performed by: NURSE PRACTITIONER

## 2021-06-24 PROCEDURE — G8417 CALC BMI ABV UP PARAM F/U: HCPCS | Performed by: NURSE PRACTITIONER

## 2021-06-24 PROCEDURE — 83036 HEMOGLOBIN GLYCOSYLATED A1C: CPT | Performed by: NURSE PRACTITIONER

## 2021-06-24 PROCEDURE — 99214 OFFICE O/P EST MOD 30 MIN: CPT | Performed by: NURSE PRACTITIONER

## 2021-06-24 RX ORDER — TADALAFIL 5 MG/1
5 TABLET ORAL PRN
Qty: 90 TABLET | Refills: 1 | Status: SHIPPED | OUTPATIENT
Start: 2021-06-24

## 2021-06-24 ASSESSMENT — PATIENT HEALTH QUESTIONNAIRE - PHQ9
SUM OF ALL RESPONSES TO PHQ QUESTIONS 1-9: 0
SUM OF ALL RESPONSES TO PHQ9 QUESTIONS 1 & 2: 0
1. LITTLE INTEREST OR PLEASURE IN DOING THINGS: 0
SUM OF ALL RESPONSES TO PHQ QUESTIONS 1-9: 0
2. FEELING DOWN, DEPRESSED OR HOPELESS: 0
SUM OF ALL RESPONSES TO PHQ QUESTIONS 1-9: 0

## 2021-06-24 NOTE — PROGRESS NOTES
Harjit Palacio, ADAMP-C  [address]  [id number] [facility].  Jacquelyn   [address]  [phone]  [phone]    Aaron Matthew is a 80 y.o. male who is here with c/o of:    Chief Complaint: Hypertension and Hyperglycemia      Patient Accompanied by: patient and daughter    HPI - Aaron Matthew is here today for f/u of chronic conditions    HTN   - he is compliant with low sodium diet on most days and is physically active   - he denies h/a, c/p, palpitations, sob, cough, lower extremity edema   - current  Medications: lisinopril 5mg daily, metoprolol 25mg daily, simvastatin 40mg nightly (has not taken today    Bilateral Carotid stenosis   - s/p left carotid endarterectomy   - s/p right carotid endarterectomy   - scheduled f/u in July - pt is worried as surgeon's office didn't want to see him until then   - no symptoms, reports wounds completely healed      Hyperglycemia   - he is compliant with diet and exercise on most days   - he denies unintentional weight gain/loss, blurred vision, polyuria, polydipsia   - no current meds  Lab Results   Component Value Date    LABA1C 6.0 06/24/2021    LABA1C 6.5 03/23/2021    LABA1C 6.2 (H) 03/04/2020     Lab Results   Component Value Date    CREATININE 1.04 03/11/2021     Patient Active Problem List    Diagnosis Date Noted    CLL (chronic lymphocytic leukemia) ([facility]     Carotid stenosis, right 03/10/2021    Alcohol use 03/10/2021    Recurrent carotid stenosis, left 10/26/2020    Hyperlipidemia 06/08/2020    Mediastinal lymphadenopathy 03/04/2020    Hyperglycemia 03/04/2020    Carotid artery stenosis 04/17/2014    GERD (gastroesophageal reflux disease) 04/17/2014    Tobacco abuse 04/17/2014    Back pain 04/17/2014    Essential hypertension     Coronary artery disease involving native coronary artery of native heart without angina pectoris             Past Medical History:   Diagnosis Date    Arthritis     Spine, left wrist    CAD (coronary artery disease)     heart attack x 2 with stent    CLL (chronic lymphocytic leukemia) (HCC)     Colon polyps     COPD (chronic obstructive pulmonary disease) (HCC)     History of heart artery stent     x 1 Placed in  after MI    Hyperlipidemia     Lymphoma (HCC)     Right side of neck, no chemo or radiation    MI (myocardial infarction) (HCC)     approx.  per pt.  Wears dentures     Wears eyeglasses       Past Surgical History:   Procedure Laterality Date    ARTHROTOMY      right foot big toe then removed    BACK SURGERY      CAROTID ENDARTERECTOMY Left 2014    CAROTID ENDARTERECTOMY Left 10/26/2020    with Lymph node biopsy fro left neck    CAROTID ENDARTERECTOMY Left 10/26/2020    REDO LEFT CAROTID ENDARTERECTOMY WITH LEFT CERVICAL LYMPH NODE BIOPSY performed by Raheel Goodrich MD at [facility] Right 3/10/2021    RIGHT CAROTID ENDARTERECTOMY performed by Raheel Goodrich MD at [address] [place] [address] per pt.  CORONARY ANGIOPLASTY WITH STENT PLACEMENT      stent x1    EYE SURGERY Bilateral     Foreign body removal    FINGER AMPUTATION Left     ring finger    HYDROCELE EXCISION Left     LUMBAR DISC SURGERY      L5-S1    LYMPH NODE DISSECTION      right side of neck    RECTAL SURGERY      fissure     Family History   Problem Relation Age of Onset    Stroke Father         two strokes  at 69    Other Mother          at 80 from unknown causes    Other Brother         alcoholism     Social History     Tobacco Use    Smoking status: Former Smoker     Packs/day: 0.50     Years: 24.00     Pack years: 12.00     Types: Cigarettes     Quit date: 3/12/2020     Years since quittin.2    Smokeless tobacco: Never Used   Substance Use Topics    Alcohol use:  Yes     Alcohol/week: 21.0 standard drinks     Types: 21 Cans of beer per week     ALLERGIES:  No Known Allergies       Subjective     · Constitutional:  Negative for activity change, appetite change,unexpected weight change, chills, fever, and fatigue. · HENT: Negative for ear pain, sore throat,  Rhinorrhea, sinus pain, sinus pressure, congestion. · Eyes:  Negative for pain and discharge. · Respiratory:  Negative for chest tightness, shortness of breath, wheezing, and cough. · Cardiovascular:  Negative for chest pain, palpitations and leg swelling. · Gastrointestinal: Negative for abdominal pain, blood in stool, constipation,diarrhea, nausea and vomiting. · Endocrine: Negative for cold intolerance, heat intolerance, polydipsia, polyphagia and polyuria. · Genitourinary: Negative for difficulty urinating, dysuria, flank pain, frequency, hematuria and urgency. · Musculoskeletal: Negative for arthralgias, back pain, joint swelling, myalgias, neck pain and neck stiffness. · Skin: Negative for rash and wound. · Allergic/Immunologic: Negative for environmental allergies and food allergies. · Neurological:  Negative for dizziness, light-headedness, numbness and headaches. · Hematological:  Negative for adenopathy. Does not bruise/bleed easily. · Psychiatric/Behavioral: Negative for self-injury, sleep disturbance and suicidal ideas. Objective     PHYSICAL EXAM:     · Constitutional: Linh Hayden is oriented to person, place, and time. Vital signs are normal. Appears well-developed and well-nourished. · HEENT:   · Head: Normocephalic and atraumatic. Right Ear: Hearing and external ear normal.     · Left Ear: Hearing and external ear normal.    · Eyes:PERRL, EOMI, Conjunctiva normal, No discharge. · Neck: Full passive range of motion. Non-tender on palpation. Neck supple. No thyromegaly present. Trachea normal.  · Cardiovascular: Normal rate, regular rhythm, S1, S2, no murmur, no gallop, no friction rub, intact distal pulses.   No carotid bruit bilaterally, no lower extremity edema  · Pulmonary/Chest: Breath sounds are clear throughout, No respiratory distress, No wheezing, No chest tenderness. Effort normal  · Musculoskeletal: Extremities appear regular and symmetric. No evident masses, lesions, foreign bodies, or other abnormalities. No edema. No tenderness on palpation. Joints are stable. Full ROM, strength and tone are within normal limits. · Lymphadenopathy: No lymphadenopathy noted. · Skin: Skin is warm, dry and intact. No obvious lesions on exposed skin  · Psychiatric: Normal mood and affect. Speech is normal and behavior is normal.     Nursing note and vitals reviewed. Blood pressure (!) 142/72, pulse 58, temperature 97.7 °F (36.5 °C), temperature source Temporal, weight 204 lb 12.8 oz (92.9 kg), SpO2 97 %. Body mass index is 29.39 kg/m². Wt Readings from Last 3 Encounters:   06/24/21 204 lb 12.8 oz (92.9 kg)   03/23/21 207 lb (93.9 kg)   03/10/21 207 lb (93.9 kg)     BP Readings from Last 3 Encounters:   06/24/21 (!) 142/72   03/23/21 124/76   03/11/21 104/71       Results for POC orders placed in visit on 06/24/21   POCT glycosylated hemoglobin (Hb A1C)   Result Value Ref Range    Hemoglobin A1C 6.0 %       Completed Orders/Prescriptions   Orders Placed This Encounter   Medications    tadalafil (CIALIS) 5 MG tablet     Sig: Take 1 tablet by mouth as needed for Erectile Dysfunction     Dispense:  90 tablet     Refill:  1           Immunizations Administered     Name Date Dose Route    Pneumococcal Polysaccharide ([id number]) 6/24/2021 0.5 mL Intramuscular    Site: Deltoid- Left    Lot: [id number]    NDC: 5497-4324-13          AssessmentPlan/Medical Decision Making     1. Essential hypertension  - slightly elevated today - most likely d/t patient missing his a.m. dose of medication  - stressed the importance of medication compliance  - reviewed DASH diet  - CBC With Auto Differential; Future  - Comprehensive Metabolic Panel; Future    2.  Hyperglycemia  - controlled  - reviewed diet and decrease alcohol consumption  - POCT glycosylated hemoglobin (Hb A1C)  - POCT microalbumin  - HM DIABETES FOOT EXAM    3. Hyperlipidemia, unspecified hyperlipidemia type  - Lipid Panel; Future    4. Drug-induced erectile dysfunction  - Good Rx coupon provided  - tadalafil (CIALIS) 5 MG tablet; Take 1 tablet by mouth as needed for Erectile Dysfunction  Dispense: 90 tablet; Refill: 1    5. Immunization due  - PNEUMOVAX 23 subcutaneous/IM (Pneumococcal polysaccharide vaccine 23-valent >= 1yo)        Return in about 6 months (around 12/24/2021) for Routine follow up of chronic conditions. 1.  Brandi Lomax received counseling on the following healthy behaviors: nutrition, exercise and medication adherence  2. Patient given educational materials - see patient instructions  3. Was a self-tracking handout given in paper form or via [employer]? No  If yes, see orders or list here. 4.  Discussed use, benefit, and side effects of prescribed medications. Barriers to medication compliance addressed. All patient questions answered. Pt voiced understanding. 5.  Reviewed prior labs, imaging, consultation, follow up, and health maintenance  6. Continue current medications, diet and exercise. 7. Discussed use, benefit, and side effects of prescribed medications. Barriers to medication compliance addressed. All her questions were answered. Pt voiced understanding. Brandi Lomax will continue current medications, diet and exercise. Patient given educational materials on DASH, low fat diet    Of the 30 minute duration appointment visit, Felisa Roberts CNP spent at least 50% of the face-to-face time in counseling, explanation of diagnosis, planning of further management, and answering all questions. Signed:  Felisa Roberts CNP    This note is created with the assistance of a speech-recognition program.  While intending to generate a document that actually reflects the content of the visit, no guarantees can be provided that every mistake has been identified and corrected by editing.

## 2021-07-13 ENCOUNTER — TELEPHONE (OUTPATIENT)
Dept: FAMILY MEDICINE CLINIC | Age: 71
End: 2021-07-13

## 2021-07-13 NOTE — TELEPHONE ENCOUNTER
Patient called and said he got a denial for the pneumo-vax because he is only allowed to have it once. It states he previously got it. He is showing a balance of $217.00. Looking into the chart he was given one on 06/08/2020 and then again on 06/24/21.

## 2021-09-02 ENCOUNTER — TELEPHONE (OUTPATIENT)
Dept: FAMILY MEDICINE CLINIC | Age: 71
End: 2021-09-02

## 2021-09-02 NOTE — TELEPHONE ENCOUNTER
Pt is calling to say that he is having a problem with his left eye. He says he can look at someone talking and it will just \"drift shut\"  Do you want to see patient or will you refer him to somebody?

## 2021-09-08 ENCOUNTER — OFFICE VISIT (OUTPATIENT)
Dept: FAMILY MEDICINE CLINIC | Age: 71
End: 2021-09-08
Payer: MEDICARE

## 2021-09-08 VITALS
DIASTOLIC BLOOD PRESSURE: 76 MMHG | TEMPERATURE: 97.5 F | SYSTOLIC BLOOD PRESSURE: 137 MMHG | WEIGHT: 205 LBS | OXYGEN SATURATION: 96 % | HEART RATE: 62 BPM | BODY MASS INDEX: 29.41 KG/M2

## 2021-09-08 DIAGNOSIS — H02.402 PTOSIS OF EYELID, LEFT: Primary | ICD-10-CM

## 2021-09-08 PROCEDURE — 99213 OFFICE O/P EST LOW 20 MIN: CPT | Performed by: NURSE PRACTITIONER

## 2021-09-08 PROCEDURE — 3017F COLORECTAL CA SCREEN DOC REV: CPT | Performed by: NURSE PRACTITIONER

## 2021-09-08 PROCEDURE — 1123F ACP DISCUSS/DSCN MKR DOCD: CPT | Performed by: NURSE PRACTITIONER

## 2021-09-08 PROCEDURE — G8427 DOCREV CUR MEDS BY ELIG CLIN: HCPCS | Performed by: NURSE PRACTITIONER

## 2021-09-08 PROCEDURE — 4040F PNEUMOC VAC/ADMIN/RCVD: CPT | Performed by: NURSE PRACTITIONER

## 2021-09-08 PROCEDURE — 1036F TOBACCO NON-USER: CPT | Performed by: NURSE PRACTITIONER

## 2021-09-08 PROCEDURE — G8417 CALC BMI ABV UP PARAM F/U: HCPCS | Performed by: NURSE PRACTITIONER

## 2021-09-08 ASSESSMENT — PATIENT HEALTH QUESTIONNAIRE - PHQ9
SUM OF ALL RESPONSES TO PHQ QUESTIONS 1-9: 0
1. LITTLE INTEREST OR PLEASURE IN DOING THINGS: 0
2. FEELING DOWN, DEPRESSED OR HOPELESS: 0
SUM OF ALL RESPONSES TO PHQ QUESTIONS 1-9: 0
SUM OF ALL RESPONSES TO PHQ QUESTIONS 1-9: 0
SUM OF ALL RESPONSES TO PHQ9 QUESTIONS 1 & 2: 0

## 2021-09-08 NOTE — PROGRESS NOTES
Blanca Mendieta, ADAMP-C  [address]  [id number] [facility]. [address]  [phone]  [phone]    Debbie Mello is a 80 y.o. male who is here with c/o of:    Chief Complaint: Eye Problem (Left eye drooping, 2-3 months)      Patient Accompanied by: n/a    HPI - Debbie Mello is here today for the following:     Eye   - patient reports noticing his top eye lid of his left eye was drooping several months ago (2-3)   - he reports he has lost peripheral vision due to the skin of the upper lid hanging down   - he has no other symptoms and has not tried anything to help relieve the symptoms      Patient Active Problem List   Diagnosis    Essential hypertension    Carotid artery stenosis    GERD (gastroesophageal reflux disease)    Coronary artery disease involving native coronary artery of native heart without angina pectoris    Tobacco abuse    Back pain    Mediastinal lymphadenopathy    Hyperglycemia    Hyperlipidemia    Recurrent carotid stenosis, left    Carotid stenosis, right    Alcohol use    CLL (chronic lymphocytic leukemia) ([address]     Past Medical History:   Diagnosis Date    Arthritis     Spine, left wrist    CAD (coronary artery disease)     heart attack x 2 with stent    CLL (chronic lymphocytic leukemia) ([address]     Colon polyps     COPD (chronic obstructive pulmonary disease) ([address]     History of heart artery stent     x 1 Placed in 1992 after MI    Hyperlipidemia     Lymphoma ([address]     Right side of neck, no chemo or radiation    MI (myocardial infarction) ([address]     approx. 1992 per pt.     Wears dentures     Wears eyeglasses       Past Surgical History:   Procedure Laterality Date    ARTHROTOMY      right foot big toe then removed    BACK SURGERY      CAROTID ENDARTERECTOMY Left 4/16/2014    CAROTID ENDARTERECTOMY Left 10/26/2020    with Lymph node biopsy fro left neck    CAROTID ENDARTERECTOMY Left 10/26/2020    REDO LEFT CAROTID ENDARTERECTOMY WITH LEFT CERVICAL LYMPH NODE BIOPSY performed by Jake Doty MD at [address] [facility] Right 3/10/2021    RIGHT CAROTID ENDARTERECTOMY performed by Jake Doty MD at [address] [place] [address] per pt.  CORONARY ANGIOPLASTY WITH STENT PLACEMENT      stent x1    EYE SURGERY Bilateral     Foreign body removal    FINGER AMPUTATION Left     ring finger    HYDROCELE EXCISION Left     LUMBAR DISC SURGERY      L5-S1    LYMPH NODE DISSECTION      right side of neck    RECTAL SURGERY      fissure     Family History   Problem Relation Age of Onset    Stroke Father         two strokes  at 69    Other Mother          at 80 from unknown causes    Other Brother         alcoholism     Social History     Tobacco Use    Smoking status: Former Smoker     Packs/day: 0.50     Years: 24.00     Pack years: 12.00     Types: Cigarettes     Quit date: 3/12/2020     Years since quittin.4    Smokeless tobacco: Never Used   Substance Use Topics    Alcohol use: Yes     Alcohol/week: 21.0 standard drinks     Types: 21 Cans of beer per week     ALLERGIES:  No Known Allergies       Subjective     · Constitutional:  Negative for activity change, appetite change,unexpected weight change, chills, fever, and fatigue. · HENT: Negative for ear pain, sore throat,  Rhinorrhea, sinus pain, sinus pressure, congestion. · Eyes:  Negative for pain and discharge. Positive for drooping skin of left upper eye lid  · Respiratory:  Negative for chest tightness, shortness of breath, wheezing, and cough. · Cardiovascular:  Negative for chest pain, palpitations and leg swelling. · Gastrointestinal: Negative for abdominal pain, blood in stool, constipation,diarrhea, nausea and vomiting. · Endocrine: Negative for cold intolerance, heat intolerance, polydipsia, polyphagia and polyuria.    · Genitourinary: Negative for difficulty urinating, dysuria, flank pain, frequency, hematuria and urgency. · Musculoskeletal: Negative for arthralgias, back pain, joint swelling, myalgias, neck pain and neck stiffness. · Skin: Negative for rash and wound. · Allergic/Immunologic: Negative for environmental allergies and food allergies. · Neurological:  Negative for dizziness, light-headedness, numbness and headaches. · Hematological:  Negative for adenopathy. Does not bruise/bleed easily. · Psychiatric/Behavioral: Negative for self-injury, sleep disturbance and suicidal ideas. Objective     PHYSICAL EXAM:   · Constitutional: Tyler Reyes is oriented to person, place, and time. Vital signs are normal. Appears well-developed and well-nourished. · HEENT:   · Head: Normocephalic and atraumatic. · Eyes:PERRL, EOMI, Conjunctiva normal, No discharge. Left lateral upper eye lid drooping down over eye, no redness, no swelling  · Neck: Full passive range of motion. · Cardiovascular: Normal rate, regular rhythm, S1, S2, no murmur, no gallop, no friction rub, intact distal pulses. No lower extremity edema. Bilateral carotid bruit  · Pulmonary/Chest: Breath sounds are clear throughout, No respiratory distress, No wheezing, No chest tenderness. Effort normal  · Neurological: Alert and oriented to person, place, and time. Normal motor function, Normal sensory function, No focal deficits noted. He has normal strength. · Skin: Skin is warm, dry and intact. No obvious lesions on exposed skin  · Psychiatric: Normal mood and affect. Speech is normal and behavior is normal.     Nursing note and vitals reviewed. Blood pressure 137/76, pulse 62, temperature 97.5 °F (36.4 °C), temperature source Temporal, weight 205 lb (93 kg), SpO2 96 %. Body mass index is 29.41 kg/m².     Wt Readings from Last 3 Encounters:   09/08/21 205 lb (93 kg)   06/24/21 204 lb 12.8 oz (92.9 kg)   03/23/21 207 lb (93.9 kg)     BP Readings from Last 3 Encounters:   09/08/21 137/76   06/24/21 (!) 142/72   03/23/21 124/76       No results found for this visit on 09/08/21. Completed Orders/Prescriptions   No orders of the defined types were placed in this encounter. AssessmentPlan/Medical Decision Making     1. Ptosis of eyelid, left  - pt declines referral to surgeon at this time  - pt states understanding that the condition can worsen causing complete visual disturbance and cautioned him for driving as his peripheral vision is compromised      Return if symptoms worsen or fail to improve. 1.  Robbie Youssef received counseling on the following healthy behaviors: nutrition, exercise and medication adherence  2. Patient given educational materials - see patient instructions  3. Was a self-tracking handout given in paper form or via [employer]? No  If yes, see orders or list here. 4.  Discussed use, benefit, and side effects of prescribed medications. Barriers to medication compliance addressed. All patient questions answered. Pt voiced understanding. 5.  Reviewed prior labs, imaging, consultation, follow up, and health maintenance  6. Continue current medications, diet and exercise. 7. Discussed use, benefit, and side effects of prescribed medications. Barriers to medication compliance addressed. All her questions were answered. Pt voiced understanding. Robbie Youssef will continue current medications, diet and exercise. Of the 25 minute duration appointment visit, Alisha Lebron CNP spent at least 50% of the face-to-face time in counseling, explanation of diagnosis, planning of further management, and answering all questions. Signed:  Alisha Lebron CNP    This note is created with the assistance of a speech-recognition program.  While intending to generate a document that actually reflects the content of the visit, no guarantees can be provided that every mistake has been identified and corrected by editing.

## 2021-09-08 NOTE — PATIENT INSTRUCTIONS
Patient Education        Learning About Ptosis  What is ptosis? Ptosis (say \"GIOVANA-keyla\") means that the upper eyelid droops in a way that's not normal. Some people are born with ptosis. Others may get it later in life. It may be caused by problems with the muscles or nerves that help move the eyelid. If muscle or nerve problems cause ptosis, it may be more serious. What are the symptoms? When you have ptosis, the drooping eyelid may block your vision. This can make it very hard to do your daily activities. Some people also have headaches or fatigue. How is it diagnosed? To find out if you have ptosis, your doctor will ask questions about your eye problems and do an exam. The doctor will test the strength of the muscles that move the eyelid. If your doctor thinks there may be a problem with the muscles or nerves, you may have more tests. These include imaging tests, such as an MRI, and tests to check the nerves. How is ptosis treated? Treatment for ptosis depends on the cause. Your doctor will try to find the cause and see if treatment may help. Some causes of ptosis may go away on their own over time. If ptosis interferes with your vision, your doctor may talk to you about having surgery. When should you call for help? Call your doctor now or seek immediate medical care if:  · You have new or worse eye pain. · You have vision changes. · You have double vision. Watch closely for changes in your health, and be sure to contact your doctor if:  · You do not get better as expected. Follow-up care is a key part of your treatment and safety. Be sure to make and go to all appointments, and call your doctor if you are having problems. It's also a good idea to know your test results and keep a list of the medicines you take. Where can you learn more? Go to [email or web address] and sign in to your [employer] account.  Enter R715 in the [employer] box to learn more about \"Learning About Ptosis. \"     If you do not have an account, please click on the \"Sign Up Now\" link. Current as of: August 31, 2020               Content Version: 12.9  © 2006-2021 Healthwise, Incorporated. Care instructions adapted under license by [facility] [place] ([facility]). If you have questions about a medical condition or this instruction, always ask your healthcare professional. [address] any warranty or liability for your use of this information.

## 2021-10-13 ENCOUNTER — HOSPITAL ENCOUNTER (OUTPATIENT)
Age: 71
Setting detail: SPECIMEN
Discharge: HOME | End: 2021-10-13
Payer: MEDICARE

## 2021-10-13 ENCOUNTER — OFFICE VISIT (OUTPATIENT)
Dept: PRIMARY CARE CLINIC | Age: 71
End: 2021-10-13
Payer: MEDICARE

## 2021-10-13 VITALS
OXYGEN SATURATION: 91 % | WEIGHT: 205 LBS | HEART RATE: 89 BPM | BODY MASS INDEX: 29.35 KG/M2 | HEIGHT: 70 IN | TEMPERATURE: 99.1 F | DIASTOLIC BLOOD PRESSURE: 82 MMHG | SYSTOLIC BLOOD PRESSURE: 147 MMHG

## 2021-10-13 DIAGNOSIS — B34.9 VIRAL ILLNESS: ICD-10-CM

## 2021-10-13 DIAGNOSIS — Z20.822 SUSPECTED COVID-19 VIRUS INFECTION: ICD-10-CM

## 2021-10-13 DIAGNOSIS — J98.8 RESPIRATORY INFECTION: Primary | ICD-10-CM

## 2021-10-13 PROCEDURE — G8417 CALC BMI ABV UP PARAM F/U: HCPCS | Performed by: FAMILY MEDICINE

## 2021-10-13 PROCEDURE — G8484 FLU IMMUNIZE NO ADMIN: HCPCS | Performed by: FAMILY MEDICINE

## 2021-10-13 PROCEDURE — 1036F TOBACCO NON-USER: CPT | Performed by: FAMILY MEDICINE

## 2021-10-13 PROCEDURE — G8427 DOCREV CUR MEDS BY ELIG CLIN: HCPCS | Performed by: FAMILY MEDICINE

## 2021-10-13 PROCEDURE — 99214 OFFICE O/P EST MOD 30 MIN: CPT | Performed by: FAMILY MEDICINE

## 2021-10-13 PROCEDURE — 4040F PNEUMOC VAC/ADMIN/RCVD: CPT | Performed by: FAMILY MEDICINE

## 2021-10-13 PROCEDURE — 1123F ACP DISCUSS/DSCN MKR DOCD: CPT | Performed by: FAMILY MEDICINE

## 2021-10-13 PROCEDURE — 3017F COLORECTAL CA SCREEN DOC REV: CPT | Performed by: FAMILY MEDICINE

## 2021-10-13 RX ORDER — DOXYCYCLINE HYCLATE 100 MG
100 TABLET ORAL 2 TIMES DAILY
Qty: 14 TABLET | Refills: 0 | Status: SHIPPED | OUTPATIENT
Start: 2021-10-13 | End: 2021-10-20

## 2021-10-13 RX ORDER — PREDNISONE 20 MG/1
40 TABLET ORAL DAILY
Qty: 10 TABLET | Refills: 0 | Status: SHIPPED | OUTPATIENT
Start: 2021-10-13 | End: 2021-10-18

## 2021-10-13 ASSESSMENT — ENCOUNTER SYMPTOMS
DIARRHEA: 0
SORE THROAT: 0
VOMITING: 0
WHEEZING: 1
NAUSEA: 0
RHINORRHEA: 1
COUGH: 1
ABDOMINAL PAIN: 0
SHORTNESS OF BREATH: 1

## 2021-10-13 NOTE — PROGRESS NOTES
[facility] [id number] [facility] [place] [facility]  [address] [address]  Dept: [phone]  Dept Fax: [phone]    Franck Jain is a 80 y.o. male who presents today for his medical conditions/complaintsas noted below. Franck Jain is c/o of Cough (pt is here for a cough, congestion, fatigue, bodyache)        HPI:     Cough  This is a new problem. The current episode started in the past 7 days. The problem has been unchanged. The problem occurs constantly. The cough is non-productive. Associated symptoms include ear pain, headaches, myalgias, nasal congestion, rhinorrhea, shortness of breath and wheezing. Pertinent negatives include no fever, rash or sore throat. Associated symptoms comments: fatigue. Nothing aggravates the symptoms. Treatments tried: mucous dm, halls. The treatment provided no relief. His past medical history is significant for COPD and pneumonia. There is no history of asthma or environmental allergies. Past medical history of hypertension, CAD/MI, GERD, CLL, COPD  NO known sick conbtacts  Past Medical History:   Diagnosis Date    Arthritis     Spine, left wrist    CAD (coronary artery disease)     heart attack x 2 with stent    CLL (chronic lymphocytic leukemia) ([address]     Colon polyps     COPD (chronic obstructive pulmonary disease) ([address]     History of heart artery stent     x 1 Placed in 1992 after MI    Hyperlipidemia     Lymphoma ([address]     Right side of neck, no chemo or radiation    MI (myocardial infarction) ([address]     approx. 1992 per pt.     Wears dentures     Wears eyeglasses     Past medical history reviewed and pertinent positives/negatives in the HPI    Past Surgical History:   Procedure Laterality Date    ARTHROTOMY      right foot big toe then removed    BACK SURGERY      CAROTID ENDARTERECTOMY Left 4/16/2014    CAROTID ENDARTERECTOMY Left 10/26/2020    with Lymph node biopsy fro left neck    CAROTID ENDARTERECTOMY Left daily. No current facility-administered medications for this visit. No Known Allergies    Health Maintenance   Topic Date Due    COVID-19 Vaccine (3 - Moderna risk 3-dose series) 04/09/2021    Annual Wellness Visit (AWV)  07/15/2021    Flu vaccine (1) 09/01/2021    Shingles Vaccine (1 of 2) 03/23/2022 (Originally 3/3/2001)    A1C test (Diabetic or Prediabetic)  06/24/2022    Lipid screen  06/24/2022    Pneumococcal 65+ yrs at Risk Vaccine (2 of 2 - PCV13) 06/24/2022    Potassium monitoring  06/24/2022    Creatinine monitoring  06/24/2022    Colon cancer screen colonoscopy  06/08/2025    DTaP/Tdap/Td vaccine (2 - Td or Tdap) 06/08/2030    AAA screen  Completed    Hepatitis C screen  Completed    Hepatitis A vaccine  Aged Out    Hepatitis B vaccine  Aged Out    Hib vaccine  Aged Out    Meningococcal (ACWY) vaccine  Aged Out       :      Review of Systems   Constitutional: Positive for fatigue. Negative for fever. HENT: Positive for congestion, ear pain and rhinorrhea. Negative for sore throat. Respiratory: Positive for cough, shortness of breath and wheezing. Gastrointestinal: Negative for abdominal pain, diarrhea, nausea and vomiting. Musculoskeletal: Positive for myalgias. Skin: Negative for pallor and rash. Allergic/Immunologic: Negative for environmental allergies. Neurological: Positive for headaches. Hematological: Negative for adenopathy. Objective:     Physical Exam  Vitals and nursing note reviewed. Constitutional:       Appearance: Normal appearance. He is ill-appearing. HENT:      Head: Normocephalic and atraumatic. Right Ear: Hearing normal.      Left Ear: Hearing normal.      Nose: Mucosal edema and congestion present. Right Sinus: Maxillary sinus tenderness present. Left Sinus: Maxillary sinus tenderness present. Mouth/Throat:      Lips: Pink. Mouth: Mucous membranes are moist.      Pharynx: Oropharynx is clear.    Eyes: Extraocular Movements: Extraocular movements intact. Conjunctiva/sclera: Conjunctivae normal.   Cardiovascular:      Rate and Rhythm: Normal rate and regular rhythm. Heart sounds: Normal heart sounds. Pulmonary:      Effort: Pulmonary effort is normal.      Breath sounds: Rhonchi present. Comments: Decreased breath sounds greater on left side  Musculoskeletal:      Cervical back: Normal range of motion. No muscular tenderness. Skin:     General: Skin is warm and dry. Neurological:      Mental Status: He is alert and oriented to person, place, and time. Mental status is at baseline. Psychiatric:         Mood and Affect: Mood normal.         Behavior: Behavior normal.         Thought Content: Thought content normal.         Judgment: Judgment normal.       BP (!) 147/82 (Site: Right Upper Arm, Position: Sitting, Cuff Size: Large Adult)   Pulse 89   Temp 99.1 °F (37.3 °C) (Temporal)   Ht 5' 10\" (1.778 m)   Wt 205 lb (93 kg)   SpO2 91%   BMI 29.41 kg/m²     Assessment:       Diagnosis Orders   1. Respiratory infection     2. Suspected COVID-19 virus infection  COVID-19   3. Viral illness         Plan:   Due to severity of symptoms and patient being high risk for progression of disease we will treat as a bacterial infection at this time  We will send Covid swab for culture and call when results are available. Take doxycycline and prednisone as prescribed for respiratory infection  If symptoms worsen or do not improve please follow-up with PCP or return to clinic  Orders Placed This Encounter   Procedures    COVID-19     Scheduling Instructions:      1) Due to current limited availability of the COVID-19 test, tests will be prioritized based on responses to questions above. Testing may be delayed due to volume.             2) Print and instruct patient to adhere to CDC home isolation program. (Link Above)              3) Set up or refer patient for a monitoring program.              4) Have patient sign up for and leverage MyChart (if not previously done). Order Specific Question:   Is this test for diagnosis or screening? Answer:   Diagnosis of ill patient     Order Specific Question:   Symptomatic for COVID-19 as defined by CDC? Answer:   Yes     Order Specific Question:   Date of Symptom Onset     Answer:   10/10/2021     Order Specific Question:   Hospitalized for COVID-19? Answer:   No     Order Specific Question:   Admitted to ICU for COVID-19? Answer:   No     Order Specific Question:   Employed in healthcare setting? Answer:   Unknown     Order Specific Question:   Resident in a congregate (group) care setting? Answer:   Unknown     Order Specific Question:   Pregnant: Answer:   No     Order Specific Question:   Previously tested for COVID-19? Answer:   Yes     Orders Placed This Encounter   Medications    doxycycline hyclate (VIBRA-TABS) 100 MG tablet     Sig: Take 1 tablet by mouth 2 times daily for 7 days     Dispense:  14 tablet     Refill:  0    predniSONE (DELTASONE) 20 MG tablet     Sig: Take 2 tablets by mouth daily for 5 days     Dispense:  10 tablet     Refill:  0      Patient given educational materials - see patient instructions. Discussed use, benefit, and side effects of prescribed medications. All patient questions answered. Pt voiced understanding. Follow up as directed.      Electronicallysigned by Mauro Asencio MD on 10/13/2021 at 1:07 PM

## 2021-10-13 NOTE — PATIENT INSTRUCTIONS
Patient Education        Learning About Coronavirus [phone])  What is coronavirus (COVID-19)? COVID-19 is a disease caused by a type of coronavirus. This illness was first found in December 2019. It has since spread worldwide. Coronaviruses are a large group of viruses. They cause the common cold. They also cause more serious illnesses like [place] respiratory syndrome (MERS) and severe acute respiratory syndrome (SARS). COVID-19 is caused by a novel coronavirus. That means it's a new type that has not been seen in people before. What are the symptoms? COVID-19 symptoms may include:  · Fever. · Cough. · Trouble breathing. · Chills or repeated shaking with chills. · Muscle and body aches. · Headache. · Sore throat. · New loss of taste or smell. · Vomiting. · Diarrhea. In severe cases, COVID-19 can cause pneumonia and make it hard to breathe without help from a machine. It can cause death. How is it diagnosed? COVID-19 is diagnosed with a viral test. This may also be called a PCR test or antigen test. It looks for evidence of the virus in your breathing passages or lungs (respiratory system). The test is most often done on a sample from the nose, throat, or lungs. It's sometimes done on a sample of saliva. One way a sample is collected is by putting a long swab into the back of your nose. How is it treated? Mild cases of COVID-19 can be treated at home. Serious cases need treatment in the hospital. Treatment may include medicines to reduce symptoms, plus breathing support such as oxygen therapy or a ventilator. Some people may be placed on their belly to help their oxygen levels. Treatments that may help people who have COVID-19 include:  Antiviral medicines. These medicines treat viral infections. Remdesivir is an example. Immune-based therapy. These medicines help the immune system fight COVID-19. Examples include monoclonal antibodies. Blood thinners.    These medicines help prevent blood clots. People with severe illness are at risk for blood clots. How can you protect yourself and others? The best way to protect yourself from getting sick is to:  · Get vaccinated. · Avoid sick people. · If you are not fully vaccinated:  ? Wear a mask if you have to go to public areas. ? Avoid crowds and try to stay at least 6 feet away from other people. · Cover your mouth with a tissue when you cough or sneeze. · Wash your hands often, especially after you cough or sneeze. Use soap and water, and scrub for at least 20 seconds. If soap and water aren't available, use an alcohol-based hand . · Avoid touching your mouth, nose, and eyes. To help avoid spreading the virus to others:  · Get vaccinated. · Cover your mouth with a tissue when you cough or sneeze. · Wash your hands often, especially after you cough or sneeze. Use soap and water, and scrub for at least 20 seconds. If soap and water aren't available, use an alcohol-based hand . · If you have been exposed to the virus and are not fully vaccinated:  ? Stay home. Don't go to school, work, or public areas. And don't use public transportation, ride-shares, or taxis unless you have no choice. ? Wear a mask if you have to go to public areas, like the pharmacy. · If you're sick:  ? Leave your home only if you need to get medical care. But call the doctor's office first so they know you're coming. And wear a mask. ? Wear a mask whenever you're around other people. ? Limit contact with pets and people in your home. If possible, stay in a separate bedroom and use a separate bathroom. ? Clean and disinfect your home every day. Use household  and disinfectant wipes or sprays. Take special care to clean things that you touch with your hands. How can you self-isolate when you have COVID-19? If you have COVID-19, there are things you can do to help avoid spreading the virus to others.   · Limit contact with people in your home. If possible, stay in a separate bedroom and use a separate bathroom. · Wear a mask when you are around other people. · If you have to leave home, avoid crowds and try to stay at least 6 feet away from other people. · Avoid contact with pets and other animals. · Cover your mouth and nose with a tissue when you cough or sneeze. Then throw it in the trash right away. · Wash your hands often, especially after you cough or sneeze. Use soap and water, and scrub for at least 20 seconds. If soap and water aren't available, use an alcohol-based hand . · Don't share personal household items. These include bedding, towels, cups and glasses, and eating utensils. · [address] in the warmest water allowed for the fabric type, and dry it completely. It's okay to wash other people's laundry with yours. · Clean and disinfect your home. Use household  and disinfectant wipes or sprays. When should you call for help? Call 911 anytime you think you may need emergency care. For example, call if you have life-threatening symptoms, such as:    · You have severe trouble breathing. (You can't talk at all.)     · You have constant chest pain or pressure.     · You are severely dizzy or lightheaded.     · You are confused or can't think clearly.     · You have pale, gray, or blue-colored skin or lips.     · You pass out (lose consciousness) or are very hard to wake up. Call your doctor now or seek immediate medical care if:    · You have moderate trouble breathing. (You can't speak a full sentence.)     · You are coughing up blood (more than about 1 teaspoon).     · You have signs of low blood pressure. These include feeling lightheaded; being too weak to stand; and having cold, pale, clammy skin.    Watch closely for changes in your health, and be sure to contact your doctor if:    · Your symptoms get worse.     · You are not getting better as expected.     · You have new or worse symptoms of anxiety, depression, nightmares, or flashbacks. Call before you go to the doctor's office. Follow their instructions. And wear a mask. Current as of: July 1, 2021               Content Version: 13.0  © 2006-2021 Healthwise, Incorporated. Care instructions adapted under license by [facility] ([facility]). If you have questions about a medical condition or this instruction, always ask your healthcare professional. [place] [address] any warranty or liability for your use of this information. We will send Covid swab for culture and call when results are available.   Take doxycycline and prednisone as prescribed for respiratory infection  If symptoms worsen or do not improve please follow-up with PCP or return to clinic

## 2021-10-15 LAB
SARS-COV-2: NORMAL
SARS-COV-2: NOT DETECTED
SOURCE: NORMAL

## 2021-10-30 DIAGNOSIS — J40 BRONCHITIS: Primary | ICD-10-CM

## 2021-10-30 RX ORDER — AZITHROMYCIN 250 MG/1
TABLET, FILM COATED ORAL
Qty: 1 PACKET | Refills: 0 | Status: SHIPPED | OUTPATIENT
Start: 2021-10-30 | End: 2021-12-09

## 2021-10-30 RX ORDER — DOXYCYCLINE HYCLATE 100 MG
100 TABLET ORAL 2 TIMES DAILY
Qty: 14 TABLET | Refills: 0 | OUTPATIENT
Start: 2021-10-30 | End: 2021-11-06

## 2021-10-30 RX ORDER — PREDNISONE 20 MG/1
40 TABLET ORAL DAILY
Qty: 10 TABLET | Refills: 0 | OUTPATIENT
Start: 2021-10-30 | End: 2021-11-04

## 2021-10-30 RX ORDER — PREDNISONE 20 MG/1
40 TABLET ORAL DAILY
Qty: 10 TABLET | Refills: 0 | Status: SHIPPED | OUTPATIENT
Start: 2021-10-30 | End: 2021-11-04

## 2021-10-30 NOTE — TELEPHONE ENCOUNTER
Patient was seen 10/13 and his symptoms came back, he is asking for refill on the two meds he was prescribed that day.   Please advise

## 2021-12-09 ENCOUNTER — OFFICE VISIT (OUTPATIENT)
Dept: FAMILY MEDICINE CLINIC | Age: 71
End: 2021-12-09
Payer: MEDICARE

## 2021-12-09 VITALS
WEIGHT: 201 LBS | DIASTOLIC BLOOD PRESSURE: 82 MMHG | HEART RATE: 63 BPM | BODY MASS INDEX: 28.84 KG/M2 | SYSTOLIC BLOOD PRESSURE: 168 MMHG | TEMPERATURE: 97.2 F | OXYGEN SATURATION: 96 %

## 2021-12-09 DIAGNOSIS — I49.9 IRREGULAR HEART BEAT: ICD-10-CM

## 2021-12-09 DIAGNOSIS — R73.9 HYPERGLYCEMIA: ICD-10-CM

## 2021-12-09 DIAGNOSIS — I10 ESSENTIAL HYPERTENSION: ICD-10-CM

## 2021-12-09 DIAGNOSIS — Z00.00 ROUTINE GENERAL MEDICAL EXAMINATION AT A HEALTH CARE FACILITY: Primary | ICD-10-CM

## 2021-12-09 DIAGNOSIS — E78.5 HYPERLIPIDEMIA, UNSPECIFIED HYPERLIPIDEMIA TYPE: ICD-10-CM

## 2021-12-09 DIAGNOSIS — Z78.9 ALCOHOL USE: ICD-10-CM

## 2021-12-09 DIAGNOSIS — Z23 INFLUENZA VACCINE NEEDED: ICD-10-CM

## 2021-12-09 DIAGNOSIS — Z13.31 DEPRESSION SCREENING NEGATIVE: ICD-10-CM

## 2021-12-09 PROCEDURE — G8484 FLU IMMUNIZE NO ADMIN: HCPCS | Performed by: NURSE PRACTITIONER

## 2021-12-09 PROCEDURE — 3017F COLORECTAL CA SCREEN DOC REV: CPT | Performed by: NURSE PRACTITIONER

## 2021-12-09 PROCEDURE — 93000 ELECTROCARDIOGRAM COMPLETE: CPT | Performed by: NURSE PRACTITIONER

## 2021-12-09 PROCEDURE — 4040F PNEUMOC VAC/ADMIN/RCVD: CPT | Performed by: NURSE PRACTITIONER

## 2021-12-09 PROCEDURE — G0444 DEPRESSION SCREEN ANNUAL: HCPCS | Performed by: NURSE PRACTITIONER

## 2021-12-09 PROCEDURE — 1123F ACP DISCUSS/DSCN MKR DOCD: CPT | Performed by: NURSE PRACTITIONER

## 2021-12-09 PROCEDURE — G0439 PPPS, SUBSEQ VISIT: HCPCS | Performed by: NURSE PRACTITIONER

## 2021-12-09 PROCEDURE — 90694 VACC AIIV4 NO PRSRV 0.5ML IM: CPT | Performed by: NURSE PRACTITIONER

## 2021-12-09 PROCEDURE — G0008 ADMIN INFLUENZA VIRUS VAC: HCPCS | Performed by: NURSE PRACTITIONER

## 2021-12-09 RX ORDER — LISINOPRIL 20 MG/1
20 TABLET ORAL DAILY
Qty: 30 TABLET | Refills: 0
Start: 2021-12-09 | End: 2022-02-25 | Stop reason: SDUPTHER

## 2021-12-09 ASSESSMENT — PATIENT HEALTH QUESTIONNAIRE - PHQ9
SUM OF ALL RESPONSES TO PHQ QUESTIONS 1-9: 1
SUM OF ALL RESPONSES TO PHQ QUESTIONS 1-9: 1
2. FEELING DOWN, DEPRESSED OR HOPELESS: 1
1. LITTLE INTEREST OR PLEASURE IN DOING THINGS: 0
SUM OF ALL RESPONSES TO PHQ QUESTIONS 1-9: 1
SUM OF ALL RESPONSES TO PHQ9 QUESTIONS 1 & 2: 1

## 2021-12-09 ASSESSMENT — LIFESTYLE VARIABLES: HOW OFTEN DO YOU HAVE A DRINK CONTAINING ALCOHOL: 0

## 2021-12-09 NOTE — PATIENT INSTRUCTIONS
Personalized Preventive Plan for Marcy Oconnor - 12/9/2021  Medicare offers a range of preventive health benefits. Some of the tests and screenings are paid in full while other may be subject to a deductible, co-insurance, and/or copay. Some of these benefits include a comprehensive review of your medical history including lifestyle, illnesses that may run in your family, and various assessments and screenings as appropriate. After reviewing your medical record and screening and assessments performed today your provider may have ordered immunizations, labs, imaging, and/or referrals for you. A list of these orders (if applicable) as well as your Preventive Care list are included within your After Visit Summary for your review. Other Preventive Recommendations:    · A preventive eye exam performed by an eye specialist is recommended every 1-2 years to screen for glaucoma; cataracts, macular degeneration, and other eye disorders. · A preventive dental visit is recommended every 6 months. · Try to get at least 150 minutes of exercise per week or 10,000 steps per day on a pedometer . · Order or download the FREE \"Exercise & Physical Activity: Your Everyday Guide\" from [employer]. Call 5-249.140.3822 or search [employer] online. · You need 5768-2556 mg of calcium and 4614-7476 IU of vitamin D per day. It is possible to meet your calcium requirement with diet alone, but a vitamin D supplement is usually necessary to meet this goal.  · When exposed to the sun, use a sunscreen that protects against both UVA and UVB radiation with an SPF of 30 or greater. Reapply every 2 to 3 hours or after sweating, drying off with a towel, or swimming. · Always wear a seat belt when traveling in a car. Always wear a helmet when riding a bicycle or motorcycle.

## 2021-12-09 NOTE — PROGRESS NOTES
Salma Landaverde, ADAMP-C  [address]  [id number] [place].  [facility], [address]  [phone]  [phone]    Buddy Garcia is a 80 y.o. male who is here with c/o of:    Chief Complaint: Hypertension, Hyperlipidemia, and Medicare AWV      Patient Accompanied by: patient and daughter    HPI - Buddy Garcia is here today for f/u of chronic conditions    HTN   - he is compliant with low sodium diet on most days and is physically active   - he denies h/a, c/p, palpitations, sob, cough, lower extremity edema   - current  Medications: lisinopril 5mg daily, metoprolol 25mg daily, simvastatin 40mg nightly (has not taken today   - admits that he continues to drink alcohol daily    Bilateral Carotid stenosis   - s/p left carotid endarterectomy   - s/p right carotid endarterectomy   - no symptoms, reports wounds completely healed    Hyperglycemia   - he is compliant with diet and exercise on most days   - he denies unintentional weight gain/loss, blurred vision, polyuria, polydipsia   - no current meds  Lab Results   Component Value Date    LABA1C 6.0 06/24/2021    LABA1C 6.5 03/23/2021    LABA1C 6.2 (H) 03/04/2020     Lab Results   Component Value Date    CREATININE 1.09 06/24/2021     Patient Active Problem List    Diagnosis Date Noted    Irregular heart beat 12/09/2021    CLL (chronic lymphocytic leukemia) ([facility] [address]     Carotid stenosis, right 03/10/2021    Alcohol use 03/10/2021    Recurrent carotid stenosis, left 10/26/2020    Hyperlipidemia 06/08/2020    Mediastinal lymphadenopathy 03/04/2020    Hyperglycemia 03/04/2020    Carotid artery stenosis 04/17/2014    GERD (gastroesophageal reflux disease) 04/17/2014    Essential hypertension     Coronary artery disease involving native coronary artery of native heart without angina pectoris             Past Medical History:   Diagnosis Date    Arthritis     Spine, left wrist    CAD (coronary artery disease)     heart attack x 2 with stent  CLL (chronic lymphocytic leukemia) (HCC)     Colon polyps     COPD (chronic obstructive pulmonary disease) (HCC)     History of heart artery stent     x 1 Placed in  after MI    Hyperlipidemia     Lymphoma ([address]     Right side of neck, no chemo or radiation    MI (myocardial infarction) (HCC)     approx.  per pt.  Wears dentures     Wears eyeglasses       Past Surgical History:   Procedure Laterality Date    ARTHROTOMY      right foot big toe then removed    BACK SURGERY      CAROTID ENDARTERECTOMY Left 2014    CAROTID ENDARTERECTOMY Left 10/26/2020    with Lymph node biopsy fro left neck    CAROTID ENDARTERECTOMY Left 10/26/2020    REDO LEFT CAROTID ENDARTERECTOMY WITH LEFT CERVICAL LYMPH NODE BIOPSY performed by Oscar Fletcher MD at [address] [facility] Right 3/10/2021    RIGHT CAROTID ENDARTERECTOMY performed by Oscar Fletcher MD at [address] [place] [address] per pt.  CORONARY ANGIOPLASTY WITH STENT PLACEMENT      stent x1    EYE SURGERY Bilateral     Foreign body removal    FINGER AMPUTATION Left     ring finger    HYDROCELE EXCISION Left     LUMBAR DISC SURGERY      L5-S1    LYMPH NODE DISSECTION      right side of neck    RECTAL SURGERY      fissure     Family History   Problem Relation Age of Onset    Stroke Father         two strokes  at 69    Other Mother          at 80 from unknown causes    Other Brother         alcoholism     Social History     Tobacco Use    Smoking status: Former Smoker     Packs/day: 0.50     Years: 24.00     Pack years: 12.00     Types: Cigarettes     Quit date: 3/12/2020     Years since quittin.7    Smokeless tobacco: Never Used   Substance Use Topics    Alcohol use:  Yes     Alcohol/week: 21.0 standard drinks     Types: 21 Cans of beer per week     ALLERGIES:  No Known Allergies       Subjective     · Constitutional:  Negative for activity change, appetite change,unexpected weight change, chills, fever, and fatigue. · HENT: Negative for ear pain, sore throat,  Rhinorrhea, sinus pain, sinus pressure, congestion. · Eyes:  Negative for pain and discharge. · Respiratory:  Negative for chest tightness, shortness of breath, wheezing, and cough. · Cardiovascular:  Negative for chest pain, palpitations and leg swelling. · Gastrointestinal: Negative for abdominal pain, blood in stool, constipation,diarrhea, nausea and vomiting. · Endocrine: Negative for cold intolerance, heat intolerance, polydipsia, polyphagia and polyuria. · Genitourinary: Negative for difficulty urinating, dysuria, flank pain, frequency, hematuria and urgency. · Musculoskeletal: Negative for arthralgias, back pain, joint swelling, myalgias, neck pain and neck stiffness. · Skin: Negative for rash and wound. · Allergic/Immunologic: Negative for environmental allergies and food allergies. · Neurological:  Negative for dizziness, light-headedness, numbness and headaches. · Hematological:  Negative for adenopathy. Does not bruise/bleed easily. · Psychiatric/Behavioral: Negative for self-injury, sleep disturbance and suicidal ideas. Objective     PHYSICAL EXAM:     · Constitutional: Lubna Staples is oriented to person, place, and time. Vital signs are normal. Appears well-developed and well-nourished. · HEENT:   · Head: Normocephalic and atraumatic. Right Ear: Hearing and external ear normal.     · Left Ear: Hearing and external ear normal.    · Eyes:PERRL, EOMI, Conjunctiva normal, No discharge. · Neck: Full passive range of motion. Non-tender on palpation. Neck supple. No thyromegaly present. Trachea normal.  · Cardiovascular: Normal rate, Irregular rhythm, S1, S2, no murmur, no gallop, no friction rub, intact distal pulses. No carotid bruit bilaterally, no lower extremity edema  · Pulmonary/Chest: Breath sounds are clear throughout, No respiratory distress, No wheezing, No chest tenderness.  Effort normal  · Skin: Skin is warm, dry and intact. No obvious lesions on exposed skin  · Psychiatric: Normal mood and affect. Speech is normal and behavior is normal.     Nursing note and vitals reviewed. Blood pressure (!) 168/82, pulse 63, temperature 97.2 °F (36.2 °C), temperature source Temporal, weight 201 lb (91.2 kg), SpO2 96 %. Body mass index is 28.84 kg/m². Wt Readings from Last 3 Encounters:   12/09/21 201 lb (91.2 kg)   10/13/21 205 lb (93 kg)   09/08/21 205 lb (93 kg)     BP Readings from Last 3 Encounters:   12/09/21 (!) 168/82   10/13/21 (!) 147/82   09/08/21 137/76       No results found for this visit on 12/09/21. Completed Orders/Prescriptions   No orders of the defined types were placed in this encounter. Immunizations Administered     Name Date Dose Route    Influenza, Quadv, adjuvanted, 65 yrs +, IM, PF (Fluad) 12/9/2021 0.5 mL Intramuscular    Site: Deltoid- Left    Lot: [id number]    NDC: 90815-357-22          AssessmentPlan/Medical Decision Making     1. Routine general medical examination at a health care facility    2. Depression screening negative  - PHQ-9 Total Score: 1 (12/9/2021  9:31 AM)  - AL DEPRESSION SCREEN ANNUAL    3. Influenza vaccine needed  - INFLUENZA, QUADV, ADJUVANTED, 65 YRS =, IM, PF, PREFILL SYR, 0.5ML (FLUAD)    4. Essential hypertension  - not controlled  - strongly encouraged him to decrease alcohol consumption  - increase lisinopril 20mg daily   - continue metoprolol XL 25mg daily  - f/u 1 week with MA for b/p check    5. Irregular heart beat  - nothing significant on EKG  - 18887 - AL ELECTROCARDIOGRAM, COMPLETE    6. Hyperlipidemia, unspecified hyperlipidemia type  - reviewed diet  - continue simvastatin 40mg daily    7. Hyperglycemia  - reviewed diet and decrease alcohol    8.  Alcohol use  - strongly encouraged him to decrease alcohol consumption    Return in 1 week for b/p check with MA; 3 months for me  Return for Medicare Annual Wellness Visit in 1 year.    1.  Altaf Bourgeois received counseling on the following healthy behaviors: nutrition, exercise and medication adherence  2. Patient given educational materials - see patient instructions  3. Was a self-tracking handout given in paper form or via Bo[employer]? No  If yes, see orders or list here. 4.  Discussed use, benefit, and side effects of prescribed medications. Barriers to medication compliance addressed. All patient questions answered. Pt voiced understanding. 5.  Reviewed prior labs, imaging, consultation, follow up, and health maintenance  6. Continue current medications, diet and exercise. 7. Discussed use, benefit, and side effects of prescribed medications. Barriers to medication compliance addressed. All her questions were answered. Pt voiced understanding. Altaf Bourgeois will continue current medications, diet and exercise. Patient given educational materials on DASH, low fat diet    Medical Decision Making: moderate    Of the 30 minute duration appointment visit, Mortimer Knee, CNP spent at least 50% of the face-to-face time in counseling, explanation of diagnosis, planning of further management, and answering all questions. Signed:  Mortimer Knee, CNP    This note is created with the assistance of a speech-recognition program.  While intending to generate a document that actually reflects the content of the visit, no guarantees can be provided that every mistake has been identified and corrected by editing. Medicare Annual Wellness Visit  Name: Paulette Sesay Date: 2021   MRN: [id number] Sex: Male   Age: 80 y.o. Ethnicity: Non- / Non    : 1950 Race: White (non-)      Derek Wise is here for Hypertension, Hyperlipidemia, and Medicare AWV    Screenings for behavioral, psychosocial and functional/safety risks, and cognitive dysfunction are all negative except as indicated below.  These results, as well as other patient data from the [facility] Risk Assessment form, are documented in Flowsheets linked to this Encounter. No Known Allergies      Prior to Visit Medications    Medication Sig Taking? Authorizing Provider   tadalafil (CIALIS) 5 MG tablet Take 1 tablet by mouth as needed for Erectile Dysfunction Yes Keke Mariano APRN - CNP   lisinopril (PRINIVIL;ZESTRIL) 10 MG tablet TAKE 1 TABLET EVERY DAY Yes LIZETH Reece - CNP   aspirin 325 MG EC tablet Take 0.5 tablets by mouth daily Yes Martha Ahumada MD   clopidogrel (PLAVIX) 75 MG tablet Take 1 tablet by mouth daily Yes Martha Ahumada MD   simvastatin (ZOCOR) 40 MG tablet Take 40 mg by mouth nightly  Yes Historical Provider, MD   Omeprazole 20 MG TBEC Take 20 tablets by mouth daily. Yes Historical Provider, MD   metoprolol (TOPROL-XL) 25 MG XL tablet Take 25 mg by mouth daily. Yes Historical Provider, MD         Past Medical History:   Diagnosis Date    Arthritis     Spine, left wrist    CAD (coronary artery disease)     heart attack x 2 with stent    [facility]) [facility]     Colon polyps     COPD (chronic obstructive pulmonary disease) ([address]     History of heart artery stent     x 1 Placed in 1992 after MI    Hyperlipidemia     Lymphoma ([address]     Right side of neck, no chemo or radiation    MI (myocardial infarction) ([address]     approx. 1992 per pt.  Wears dentures     Wears eyeglasses        Past Surgical History:   Procedure Laterality Date    ARTHROTOMY      right foot big toe then removed    BACK SURGERY      CAROTID ENDARTERECTOMY Left 4/16/2014    CAROTID ENDARTERECTOMY Left 10/26/2020    with Lymph node biopsy fro left neck    CAROTID ENDARTERECTOMY Left 10/26/2020    REDO LEFT CAROTID ENDARTERECTOMY WITH LEFT CERVICAL LYMPH NODE BIOPSY performed by Martha Ahumada MD at [address] [facility] Right 3/10/2021    RIGHT CAROTID ENDARTERECTOMY performed by Martha Ahumada MD at [address] per pt.     CORONARY ANGIOPLASTY WITH STENT PLACEMENT      stent x1    EYE SURGERY Bilateral     Foreign body removal    FINGER AMPUTATION Left     ring finger    HYDROCELE EXCISION Left     LUMBAR DISC SURGERY      L5-S1    LYMPH NODE DISSECTION      right side of neck    RECTAL SURGERY      fissure         Family History   Problem Relation Age of Onset    Stroke Father         two strokes  at 69    Other Mother          at 80 from unknown causes    Other Brother         alcoholism       CareTeam (Including outside providers/suppliers regularly involved in providing care):   Patient Care Team:  LIZETH Jennings CNP as PCP - General (Nurse Practitioner)  LIZETH Jennings CNP as PCP - [facility] Empaneled Provider    Wt Readings from Last 3 Encounters:   21 201 lb (91.2 kg)   10/13/21 205 lb (93 kg)   21 205 lb (93 kg)     Vitals:    21 0936 21 0953   BP: (!) 158/76 (!) 168/82   Site:  Left Upper Arm   Position:  Sitting   Cuff Size:  Large Adult   Pulse: 63    Temp: 97.2 °F (36.2 °C)    TempSrc: Temporal    SpO2: 96%    Weight: 201 lb (91.2 kg)      Body mass index is 28.84 kg/m². Based upon direct observation of the patient, evaluation of cognition reveals recent and remote memory intact. Patient's complete Health Risk Assessment and screening values have been reviewed and are found in Flowsheets. The following problems were reviewed today and where indicated follow up appointments were made and/or referrals ordered. Positive Risk Factor Screenings with Interventions:            General Health and ACP:  General  In general, how would you say your health is?: Good  In the past 7 days, have you experienced any of the following?  New or Increased Pain, New or Increased Fatigue, Loneliness, Social Isolation, Stress or Anger?: None of These  Do you get the social and emotional support that you need?: (!) No  Do you have a Living Will?: Yes  Advance Directives     Power of [facility] Will ACP-Advance Directive ACP-Power of     Not on File Not on File Not on File Not on File      General Health Risk Interventions:  · Inadequate social/emotional support: patient's comments regarding inadequate social support: patient reports d/t COVID, patient declines any further intervention for this issue    Health Habits/Nutrition:  Health Habits/Nutrition  Do you exercise for at least 20 minutes 2-3 times per week?: (!) No  Have you lost any weight without trying in the past 3 months?: No  Do you eat only one meal per day?: No  Have you seen the dentist within the past year?: N/A - wear dentures     Health Habits/Nutrition Interventions:  · Inadequate physical activity:  educational materials provided to promote increased physical activity    Hearing/Vision:  No exam data present  Hearing/Vision  Do you or your family notice any trouble with your hearing that hasn't been managed with hearing aids?: No  Do you have difficulty driving, watching TV, or doing any of your daily activities because of your eyesight?: No  Have you had an eye exam within the past year?: (!) No  Hearing/Vision Interventions:  · Vision concerns:  patient encouraged to make appointment with his/her eye specialist    Safety:  Safety  Do you have working smoke detectors?: Yes  Have all throw rugs been removed or fastened?: (!) No  Do you have non-slip mats or surfaces in all bathtubs/showers?: (!) No  Do all of your stairways have a railing or banister?: Yes  Are your doorways, halls and stairs free of clutter?: Yes  Do you always fasten your seatbelt when you are in a car?: Yes  Safety Interventions:  · Home safety tips provided  · Patient declines any further evaluation/treatment for this issue     Personalized Preventive Plan   Current Health Maintenance Status  Immunization History   Administered Date(s) Administered    COVID-19, [address], Primary or Immunocompromised, PF, 100mcg/0.5mL 02/10/2021, 03/12/2021, 11/10/2021    Influenza, Quadv, adjuvanted, 65 yrs +, IM, PF (Fluad) 09/21/2020, 12/09/2021    Pneumococcal Polysaccharide ([id number]) 06/08/2020, 06/24/2021    Tdap (Boostrix, Adacel) 06/08/2020        Health Maintenance   Topic Date Due    Annual Wellness Visit (AWV)  07/15/2021    Shingles Vaccine (1 of 2) 03/23/2022 (Originally 3/3/2001)    COVID-19 Vaccine (4 - Booster for Moderna series) 05/10/2022    A1C test (Diabetic or Prediabetic)  06/24/2022    Lipid screen  06/24/2022    Pneumococcal 65+ yrs at Risk Vaccine (2 of 2 - PCV13) 06/24/2022    Potassium monitoring  06/24/2022    Creatinine monitoring  06/24/2022    Colon cancer screen colonoscopy  06/08/2025    DTaP/Tdap/Td vaccine (2 - Td or Tdap) 06/08/2030    Flu vaccine  Completed    AAA screen  Completed    Hepatitis C screen  Completed    Hepatitis A vaccine  Aged Out    Hepatitis B vaccine  Aged Out    Hib vaccine  Aged Out    Meningococcal (ACWY) vaccine  Aged Out     Recommendations for Revaluate Due: see orders and patient instructions/AVS.  . Recommended screening schedule for the next 5-10 years is provided to the patient in written form: see Patient Jackson Herrera was seen today for hypertension, hyperlipidemia and medicare awv.     Diagnoses and all orders for this visit:    Routine general medical examination at a health care facility    Depression screening negative  -     NM DEPRESSION SCREEN ANNUAL    Influenza vaccine needed  -     INFLUENZA, QUADV, ADJUVANTED, 72 YRS =, IM, PF, PREFILL SYR, 0.5ML (FLUAD)    Essential hypertension    Irregular heart beat  -     06530 - NM ELECTROCARDIOGRAM, COMPLETE    Hyperlipidemia, unspecified hyperlipidemia type    Hyperglycemia    Alcohol use

## 2021-12-16 ENCOUNTER — NURSE ONLY (OUTPATIENT)
Dept: FAMILY MEDICINE CLINIC | Age: 71
End: 2021-12-16

## 2021-12-16 VITALS — OXYGEN SATURATION: 99 % | DIASTOLIC BLOOD PRESSURE: 64 MMHG | HEART RATE: 53 BPM | SYSTOLIC BLOOD PRESSURE: 122 MMHG

## 2021-12-16 DIAGNOSIS — I10 ESSENTIAL HYPERTENSION: Primary | ICD-10-CM

## 2021-12-16 PROCEDURE — 99999 PR OFFICE/OUTPT VISIT,PROCEDURE ONLY: CPT | Performed by: NURSE PRACTITIONER

## 2021-12-16 NOTE — PROGRESS NOTES
Patient was here for BP check. Patient brought in a few of BP home BP readings. Please see attachment. Please see vitals.

## 2022-02-24 RX ORDER — CLOPIDOGREL BISULFATE 75 MG/1
TABLET ORAL
Qty: 90 TABLET | OUTPATIENT
Start: 2022-02-24

## 2022-02-25 DIAGNOSIS — E78.5 HYPERLIPIDEMIA, UNSPECIFIED HYPERLIPIDEMIA TYPE: ICD-10-CM

## 2022-02-25 DIAGNOSIS — I25.10 CORONARY ARTERY DISEASE INVOLVING NATIVE CORONARY ARTERY OF NATIVE HEART WITHOUT ANGINA PECTORIS: Primary | ICD-10-CM

## 2022-02-25 DIAGNOSIS — I10 ESSENTIAL HYPERTENSION: ICD-10-CM

## 2022-02-25 NOTE — TELEPHONE ENCOUNTER
Dejon Ortiz is calling to request a refill on the following medication(s):    Medication Request:  Requested Prescriptions     Pending Prescriptions Disp Refills    clopidogrel (PLAVIX) 75 MG tablet 90 tablet 1     Sig: Take 1 tablet by mouth daily    lisinopril (PRINIVIL;ZESTRIL) 20 MG tablet 90 tablet 1     Sig: Take 1 tablet by mouth daily    metoprolol succinate (TOPROL XL) 25 MG extended release tablet 90 tablet 1     Sig: Take 1 tablet by mouth daily    simvastatin (ZOCOR) 40 MG tablet 90 tablet 1     Sig: Take 1 tablet by mouth nightly       Last Visit Date (If Applicable):  35/9/4013    Next Visit Date:    Visit date not found

## 2022-03-01 RX ORDER — CLOPIDOGREL BISULFATE 75 MG/1
75 TABLET ORAL DAILY
Qty: 90 TABLET | Refills: 1 | OUTPATIENT
Start: 2022-03-01

## 2022-03-01 RX ORDER — SIMVASTATIN 40 MG
40 TABLET ORAL NIGHTLY
Qty: 90 TABLET | Refills: 1 | Status: SHIPPED | OUTPATIENT
Start: 2022-03-01

## 2022-03-01 RX ORDER — LISINOPRIL 20 MG/1
20 TABLET ORAL DAILY
Qty: 90 TABLET | Refills: 1 | Status: SHIPPED | OUTPATIENT
Start: 2022-03-01

## 2022-03-01 RX ORDER — METOPROLOL SUCCINATE 25 MG/1
25 TABLET, EXTENDED RELEASE ORAL DAILY
Qty: 90 TABLET | Refills: 1 | Status: SHIPPED | OUTPATIENT
Start: 2022-03-01

## 2022-03-03 RX ORDER — CLOPIDOGREL BISULFATE 75 MG/1
75 TABLET ORAL DAILY
Qty: 30 TABLET | Refills: 5 | OUTPATIENT
Start: 2022-03-03

## 2022-03-03 NOTE — TELEPHONE ENCOUNTER
Tj Sykes is calling to request a refill on the following medication(s):    Medication Request:  Requested Prescriptions     Pending Prescriptions Disp Refills    clopidogrel (PLAVIX) 75 MG tablet 30 tablet 5     Sig: Take 1 tablet by mouth daily       Last Visit Date (If Applicable):  95/2/2454    Next Visit Date:    Visit date not found

## 2022-03-03 NOTE — TELEPHONE ENCOUNTER
Medication refused by Dr. Jael Jeff 2/24/22 - he needs to call them and find out if he is to continue this medication.

## 2022-03-31 ENCOUNTER — TELEPHONE (OUTPATIENT)
Dept: FAMILY MEDICINE CLINIC | Age: 72
End: 2022-03-31

## 2022-04-04 ENCOUNTER — TELEPHONE (OUTPATIENT)
Dept: FAMILY MEDICINE CLINIC | Age: 72
End: 2022-04-04

## 2022-04-04 NOTE — TELEPHONE ENCOUNTER
I am not familiar with any of the eye specialist in [facility]. Please provide patient with a list of specialists for him to choose from.

## 2022-04-04 NOTE — TELEPHONE ENCOUNTER
Patient will call insurance company when daughter gets home to see who they prefer and will cb once hes done

## 2022-04-04 NOTE — TELEPHONE ENCOUNTER
Patient want your option / referral to eye dr. He start with gray spots/? floaters  that comes and goes for the last month . It's getting worse . Please let him know?  Thank you

## 2022-05-02 ENCOUNTER — OFFICE VISIT (OUTPATIENT)
Dept: FAMILY MEDICINE CLINIC | Age: 72
End: 2022-05-02
Payer: MEDICARE

## 2022-05-02 VITALS
WEIGHT: 214 LBS | HEART RATE: 61 BPM | OXYGEN SATURATION: 97 % | BODY MASS INDEX: 30.71 KG/M2 | SYSTOLIC BLOOD PRESSURE: 132 MMHG | TEMPERATURE: 97.6 F | DIASTOLIC BLOOD PRESSURE: 74 MMHG

## 2022-05-02 DIAGNOSIS — L98.9 FACIAL LESION: Primary | ICD-10-CM

## 2022-05-02 PROCEDURE — 1123F ACP DISCUSS/DSCN MKR DOCD: CPT | Performed by: NURSE PRACTITIONER

## 2022-05-02 PROCEDURE — 4040F PNEUMOC VAC/ADMIN/RCVD: CPT | Performed by: NURSE PRACTITIONER

## 2022-05-02 PROCEDURE — 3017F COLORECTAL CA SCREEN DOC REV: CPT | Performed by: NURSE PRACTITIONER

## 2022-05-02 PROCEDURE — G8427 DOCREV CUR MEDS BY ELIG CLIN: HCPCS | Performed by: NURSE PRACTITIONER

## 2022-05-02 PROCEDURE — 99213 OFFICE O/P EST LOW 20 MIN: CPT | Performed by: NURSE PRACTITIONER

## 2022-05-02 PROCEDURE — 1036F TOBACCO NON-USER: CPT | Performed by: NURSE PRACTITIONER

## 2022-05-02 PROCEDURE — G8417 CALC BMI ABV UP PARAM F/U: HCPCS | Performed by: NURSE PRACTITIONER

## 2022-05-02 SDOH — ECONOMIC STABILITY: FOOD INSECURITY: WITHIN THE PAST 12 MONTHS, YOU WORRIED THAT YOUR FOOD WOULD RUN OUT BEFORE YOU GOT MONEY TO BUY MORE.: NEVER TRUE

## 2022-05-02 SDOH — ECONOMIC STABILITY: FOOD INSECURITY: WITHIN THE PAST 12 MONTHS, THE FOOD YOU BOUGHT JUST DIDN'T LAST AND YOU DIDN'T HAVE MONEY TO GET MORE.: NEVER TRUE

## 2022-05-02 ASSESSMENT — PATIENT HEALTH QUESTIONNAIRE - PHQ9
2. FEELING DOWN, DEPRESSED OR HOPELESS: 1
SUM OF ALL RESPONSES TO PHQ QUESTIONS 1-9: 1
1. LITTLE INTEREST OR PLEASURE IN DOING THINGS: 0
SUM OF ALL RESPONSES TO PHQ9 QUESTIONS 1 & 2: 1

## 2022-05-02 ASSESSMENT — SOCIAL DETERMINANTS OF HEALTH (SDOH): HOW HARD IS IT FOR YOU TO PAY FOR THE VERY BASICS LIKE FOOD, HOUSING, MEDICAL CARE, AND HEATING?: NOT HARD AT ALL

## 2022-05-02 NOTE — PROGRESS NOTES
Bernard Arboleda, FNP-C  [address]  [id number] [facility]. [address] [place], [place] [address]  [phone]  [phone]    Sharon Witt is a 71 y.o. male presents today for Chief Complaint: Cyst (2-3 weeks)      Patient is Accompanied by: n/a    HPI - Sharon Witt is here today for the following:     Patient reports noticing a lump to the left temple a couple of weeks ago. He reports he has been squeezing at it and got some stuff out with a \"tail\" on it. He has been covering it with a Band-Aid and antibiotic ointment. He decided that since it has not gone away after 2 weeks he would come in and get it looked at      Patient Active Problem List   Diagnosis    Essential hypertension    Carotid artery stenosis    GERD (gastroesophageal reflux disease)    Coronary artery disease involving native coronary artery of native heart without angina pectoris    Mediastinal lymphadenopathy    Hyperglycemia    Hyperlipidemia    Recurrent carotid stenosis, left    Carotid stenosis, right    Alcohol use    CLL (chronic lymphocytic leukemia) (HCC)    Irregular heart beat     Past Medical History:   Diagnosis Date    Arthritis     Spine, left wrist    CAD (coronary artery disease)     heart attack x 2 with stent    CLL (chronic lymphocytic leukemia) (HCC)     Colon polyps     COPD (chronic obstructive pulmonary disease) ([address]     History of heart artery stent     x 1 Placed in 1992 after MI    Hyperlipidemia     Lymphoma ([address]     Right side of neck, no chemo or radiation    MI (myocardial infarction) ([address]     approx. 1992 per pt.     Wears dentures     Wears eyeglasses       Past Surgical History:   Procedure Laterality Date    ARTHROTOMY      right foot big toe then removed    BACK SURGERY      CAROTID ENDARTERECTOMY Left 4/16/2014    CAROTID ENDARTERECTOMY Left 10/26/2020    with Lymph node biopsy fro left neck    CAROTID ENDARTERECTOMY Left 10/26/2020    REDO LEFT CAROTID ENDARTERECTOMY WITH LEFT CERVICAL LYMPH NODE BIOPSY performed by Emma Bolanos MD at [address] Right 3/10/2021    RIGHT CAROTID ENDARTERECTOMY performed by Emma Bolanos MD at [address] [place] [address] per pt.  CORONARY ANGIOPLASTY WITH STENT PLACEMENT      stent x1    EYE SURGERY Bilateral     Foreign body removal    FINGER AMPUTATION Left     ring finger    HYDROCELE EXCISION Left     LUMBAR DISC SURGERY      L5-S1    LYMPH NODE DISSECTION      right side of neck    RECTAL SURGERY      fissure     Family History   Problem Relation Age of Onset    Stroke Father         two strokes  at 69    Other Mother          at 80 from unknown causes    Other Brother         alcoholism     Social History     Tobacco Use    Smoking status: Former Smoker     Packs/day: 0.50     Years: 24.00     Pack years: 12.00     Types: Cigarettes     Quit date: 3/12/2020     Years since quittin.1    Smokeless tobacco: Never Used   Substance Use Topics    Alcohol use: Yes     Alcohol/week: 21.0 standard drinks     Types: 21 Cans of beer per week     ALLERGIES:  No Known Allergies       Subjective     · Constitutional:  Negative for activity change, appetite change,unexpected weight change, chills, fever, and fatigue. · HENT: Negative for ear pain, sore throat,  Rhinorrhea, sinus pain, sinus pressure, congestion. · Eyes:  Negative for pain and discharge. · Respiratory:  Negative for chest tightness, shortness of breath, wheezing, and cough. · Cardiovascular:  Negative for chest pain, palpitations and leg swelling. · Gastrointestinal: Negative for abdominal pain, blood in stool, constipation,diarrhea, nausea and vomiting. · Endocrine: Negative for cold intolerance, heat intolerance, polydipsia, polyphagia and polyuria. · Genitourinary: Negative for difficulty urinating, dysuria, flank pain, frequency, hematuria and urgency.    · Musculoskeletal: Negative for arthralgias, back pain, joint swelling, myalgias, neck pain and neck stiffness. · Skin: Negative for rash and wound. Positive for skin lesion  · Allergic/Immunologic: Negative for environmental allergies and food allergies. · Neurological:  Negative for dizziness, light-headedness, numbness and headaches. · Hematological:  Negative for adenopathy. Does not bruise/bleed easily. · Psychiatric/Behavioral: Negative for self-injury, sleep disturbance and suicidal ideas. Objective     PHYSICAL EXAM:   · Constitutional: Yaritza Kwok is oriented to person, place, and time. Vital signs are normal. Appears well-developed and well-nourished. · HEENT:   Head: Normocephalic and atraumatic. Physical Exam  HENT:      Head:         · Cardiovascular: Normal rate, regular rhythm, S1, S2, no murmur, no gallop, no friction rub, intact distal pulses. No carotid bruit. No lower extremity edema  · Pulmonary/Chest: Breath sounds are clear throughout, No respiratory distress, No wheezing, No chest tenderness. Effort normal  · Neurological: Alert and oriented to person, place, and time. Normal motor function, Normal sensory function, No focal deficits noted. He has normal strength. · Skin: Skin is warm, dry and intact. No obvious lesions on exposed skin  · Psychiatric: Normal mood and affect. Speech is normal and behavior is normal.     Nursing note and vitals reviewed. Blood pressure 132/74, pulse 61, temperature 97.6 °F (36.4 °C), temperature source Temporal, weight 214 lb (97.1 kg), SpO2 97 %. Body mass index is 30.71 kg/m². Wt Readings from Last 3 Encounters:   05/02/22 214 lb (97.1 kg)   12/09/21 201 lb (91.2 kg)   10/13/21 205 lb (93 kg)     BP Readings from Last 3 Encounters:   05/02/22 132/74   12/16/21 122/64   12/09/21 (!) 168/82       No results found for this visit on 05/02/22. Completed Orders/Prescriptions   No orders of the defined types were placed in this encounter.               AssessmentPlan/Medical Decision Making     1. Facial lesion  - appears to be an abscess that he has previously drained and created trauma to surrounding tissue  - will have him evaluated by surgery to ensure proper treatment  - Aline Arana MD, Plastic Surgery, [place]      Return if symptoms worsen or fail to improve. 1.  Osiel Yanez received counseling on the following healthy behaviors: nutrition, exercise and medication adherence  2. Patient given educational materials - see patient instructions  3. Was a self-tracking handout given in paper form or via [employer]? No  If yes, see orders or list here. 4.  Discussed use, benefit, and side effects of prescribed medications. Barriers to medication compliance addressed. All patient questions answered. Pt voiced understanding. 5.  Reviewed prior labs, imaging, consultation, follow up, and health maintenance  6. Continue current medications, diet and exercise. 7. Discussed use, benefit, and side effects of prescribed medications. Barriers to medication compliance addressed. All her questions were answered. Pt voiced understanding. Osiel Yanez will continue current medications, diet and exercise. Medical Decision Making: Low    Of the 25 minute duration appointment visit, Tori Grace CNP spent at least 50% of the face-to-face time in counseling, explanation of diagnosis, planning of further management, and answering all questions. Signed:  Tori Grace CNP    This note is created with the assistance of a speech-recognition program.  While intending to generate a document that actually reflects the content of the visit, no guarantees can be provided that every mistake has been identified and corrected by editing.

## 2022-05-04 ENCOUNTER — TELEPHONE (OUTPATIENT)
Dept: FAMILY MEDICINE CLINIC | Age: 72
End: 2022-05-04

## 2022-05-04 ENCOUNTER — TELEPHONE (OUTPATIENT)
Dept: SURGERY | Age: 72
End: 2022-05-04

## 2022-05-04 NOTE — TELEPHONE ENCOUNTER
Can you please call the surgeons office and ask if we can get him in any sooner as this lesion is worrisome - if they have a different recommendation, please advise

## 2022-05-04 NOTE — TELEPHONE ENCOUNTER
5/4/22- Patient LVM and would like to know if he is having surgery or if his appt on 6/8 is for a consult with Dr. Thomas Berry.  Patient would like a call back [phone]

## 2022-05-04 NOTE — TELEPHONE ENCOUNTER
----- Message from [facility] sent at 5/4/2022  8:52 AM EDT -----  Subject: Referral Request    QUESTIONS   Reason for referral request? Patient has a referral for a plastic surgery   for Dr. Cr Mayorga. Patient cannot get in to see the doctor until the end of   June. He wants to know if Dr. Roberto Luna has anyone else available sooner or   if he should wait to see Dr. Cr Mayorga. Has the physician seen you for this condition before? No   Preferred Specialist (if applicable)? Do you already have an appointment scheduled? No  Additional Information for Provider? Call patient back regarding referral  ---------------------------------------------------------------------------  --------------  CALL BACK INFO  What is the best way for the office to contact you? OK to leave message on   voicemail  Preferred Call Back Phone Number? [phone]  ---------------------------------------------------------------------------  --------------  SCRIPT ANSWERS  Relationship to Patient?  Self

## 2022-05-04 NOTE — TELEPHONE ENCOUNTER
Spoke with Daniel Fishman from the office and we can get the patient in on June 8th at 830am that is the soonest I did contact the patient and made him aware he verbally understood

## 2022-05-05 NOTE — TELEPHONE ENCOUNTER
Spoke with patient. Advised his appointment on 6/8 is for consultation only. Any surgery will be contemplated after this appointment.

## 2022-06-08 ENCOUNTER — OFFICE VISIT (OUTPATIENT)
Dept: SURGERY | Age: 72
End: 2022-06-08
Payer: MEDICARE

## 2022-06-08 VITALS — HEIGHT: 70 IN | WEIGHT: 214 LBS | BODY MASS INDEX: 30.64 KG/M2

## 2022-06-08 DIAGNOSIS — D48.5 NEOPLASM OF UNCERTAIN BEHAVIOR OF SKIN: Primary | ICD-10-CM

## 2022-06-08 PROCEDURE — G8427 DOCREV CUR MEDS BY ELIG CLIN: HCPCS | Performed by: PLASTIC SURGERY

## 2022-06-08 PROCEDURE — 1036F TOBACCO NON-USER: CPT | Performed by: PLASTIC SURGERY

## 2022-06-08 PROCEDURE — 3017F COLORECTAL CA SCREEN DOC REV: CPT | Performed by: PLASTIC SURGERY

## 2022-06-08 PROCEDURE — G8417 CALC BMI ABV UP PARAM F/U: HCPCS | Performed by: PLASTIC SURGERY

## 2022-06-08 PROCEDURE — 1123F ACP DISCUSS/DSCN MKR DOCD: CPT | Performed by: PLASTIC SURGERY

## 2022-06-08 PROCEDURE — 99203 OFFICE O/P NEW LOW 30 MIN: CPT | Performed by: PLASTIC SURGERY

## 2022-06-08 NOTE — PROGRESS NOTES
[address] [facility]  [facility] [id number]       History and Physical     Chief Complaint   Patient presents with    New Patient        HPI:   Kirstie Russo is a 71 y.o. male who presents with a lesion on the left face. The lesion has been present for several months. It has increases in size. Patient states nothing has improved it. Medications:     Current Outpatient Medications   Medication Sig Dispense Refill    lisinopril (PRINIVIL;ZESTRIL) 20 MG tablet Take 1 tablet by mouth daily 90 tablet 1    metoprolol succinate (TOPROL XL) 25 MG extended release tablet Take 1 tablet by mouth daily 90 tablet 1    simvastatin (ZOCOR) 40 MG tablet Take 1 tablet by mouth nightly 90 tablet 1    tadalafil (CIALIS) 5 MG tablet Take 1 tablet by mouth as needed for Erectile Dysfunction 90 tablet 1    aspirin 325 MG EC tablet Take 0.5 tablets by mouth daily 30 tablet 12    clopidogrel (PLAVIX) 75 MG tablet Take 1 tablet by mouth daily 30 tablet 11    Omeprazole 20 MG TBEC Take 20 tablets by mouth daily. No current facility-administered medications for this visit. Allergies:   No Known Allergies  Review of Systems:   Constitutional: Negative for fever, chills, fatigue and unexpected weight change. HENT: Negative for hearing loss, sore throat and facial swelling. Eyes: Negative for pain and discharge. Respiratory: Patient with history of COPD  Cardiovascular: Patient with a history of coronary artery disease, hyperlipidemia, MI. Gastrointestinal: Patient with history of colon polyps  Skin: Negative for pallor and rash. Neurological: Negative for seizures, syncope and numbness. Hematological: Patient with history of leukemia  Psychiatric/Behavioral: Negative for behavioral problems. The patient is not nervous/anxious.     Musculoskeletal: Patient with a history of arthritis  Past Medical History: Diagnosis Date    Arthritis     Spine, left wrist    CAD (coronary artery disease)     heart attack x 2 with stent    CLL (chronic lymphocytic leukemia) (HCC)     Colon polyps     COPD (chronic obstructive pulmonary disease) (HCC)     History of heart artery stent     x 1 Placed in  after MI    Hyperlipidemia     Lymphoma ([facility]     Right side of neck, no chemo or radiation    MI (myocardial infarction) ([facility]     approx.  per pt.  Wears dentures     Wears eyeglasses      Past Surgical History:   Procedure Laterality Date    ARTHROTOMY      right foot big toe then removed    BACK SURGERY      CAROTID ENDARTERECTOMY Left 2014    CAROTID ENDARTERECTOMY Left 10/26/2020    with Lymph node biopsy fro left neck    CAROTID ENDARTERECTOMY Left 10/26/2020    REDO LEFT CAROTID ENDARTERECTOMY WITH LEFT CERVICAL LYMPH NODE BIOPSY performed by Herbert Mari MD at [facility] 3/10/2021    RIGHT CAROTID ENDARTERECTOMY performed by Herbert Mari MD at [address] [place] [address] per pt.  CORONARY ANGIOPLASTY WITH STENT PLACEMENT      stent x1    EYE SURGERY Bilateral     Foreign body removal    FINGER AMPUTATION Left     ring finger    HYDROCELE EXCISION Left     LUMBAR DISC SURGERY      L5-S1    LYMPH NODE DISSECTION      right side of neck    RECTAL SURGERY      fissure     Social History     Socioeconomic History    Marital status:      Spouse name: Not on file    Number of children: Not on file    Years of education: Not on file    Highest education level: Not on file   Occupational History    Occupation: [employer]    Tobacco Use    Smoking status: Former Smoker     Packs/day: 0.50     Years: 24.00     Pack years: 12.00     Types: Cigarettes     Quit date: 3/12/2020     Years since quittin.2    Smokeless tobacco: Never Used   Vaping Use    Vaping Use: Never used   Substance and Sexual Activity    Alcohol use:  Yes Alcohol/week: 21.0 standard drinks     Types: 21 Cans of beer per week    Drug use: No    Sexual activity: Not on file   Other Topics Concern    Not on file   Social History Narrative    Lives at home alone. Has two children, a son who lives out of town and daughter who lives in the area. Social Determinants of Health     Financial Resource Strain: Low Risk     Difficulty of Paying Living Expenses: Not hard at all   Food Insecurity: No Food Insecurity    Worried About Running Out of Food in the Last Year: Never true    [place] of Food in the Last Year: Never true   Transportation Needs:     Lack of Transportation (Medical): Not on file    Lack of Transportation (Non-Medical): Not on file   Physical Activity:     Days of Exercise per Week: Not on file    Minutes of Exercise per Session: Not on file   Stress:     Feeling of Stress : Not on file   Social Connections:     Frequency of Communication with Friends and Family: Not on file    Frequency of Social Gatherings with Friends and Family: Not on file    Attends Shinto Services: Not on file    Active Member of [address] [place] [address] [employer] or Organizations: Not on file    Attends Club or Organization Meetings: Not on file    Marital Status: Not on file   Intimate Partner Violence:     Fear of Current or Ex-Partner: Not on file    Emotionally Abused: Not on file    Physically Abused: Not on file    Sexually Abused: Not on file   Housing Stability:     Unable to Pay for Housing in the Last Year: Not on file    Number of [place] in the Last Year: Not on file    Unstable Housing in the Last Year: Not on file     Family History   Problem Relation Age of Onset    Stroke Father         two strokes  at 69    Other Mother          at 80 from unknown causes    Other Brother         alcoholism     Physical Exam:   Ht 5' 10\" (1.778 m)   Wt 214 lb (97.1 kg)   BMI 30.71 kg/m²    Body mass index is 30.71 kg/m².   Physical Exam   Nursing note and vitals reviewed. Constitutional: Oriented to person, place, and time. Appears well-developed and well-nourished. No distress. Head: Normocephalic and atraumatic. Eyes: Conjunctivae and EOM are normal.   Pulmonary/Chest: Effort normal. No respiratory distress. Neurological: Alert and oriented to person, place, and time. Skin:         It may be either consistent with basal cell versus squamous cell in situ it measures 1.5 x 1.5 cm  Psychiatric: Normal mood and affect. Behavior is normal    Imaging:   @[address]@        Impression/Plan:      Diagnosis Orders   1. Neoplasm of uncertain behavior of skin       Patient Active Problem List   Diagnosis    Essential hypertension    Carotid artery stenosis    GERD (gastroesophageal reflux disease)    Coronary artery disease involving native coronary artery of native heart without angina pectoris    Mediastinal lymphadenopathy    Hyperglycemia    Hyperlipidemia    Recurrent carotid stenosis, left    Carotid stenosis, right    Alcohol use    CLL (chronic lymphocytic leukemia) (HCC)    Irregular heart beat       Plan:  Patient with a lesion on the left face. 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 Patient is on Plavix an in office biopsy could not be performed. At this time we will plan to do an excision in the OR with frozen sections. The risk of the procedure was discussed with patient detail. All questions were answered. We also discussed possible flap reconstruction and possible skin graft. We will also need to obtain clearance to determine if he can stop the Plavix or not. The following information was discussed with the patient, but this is not an all-inclusive-other issue were also discussed with patient. GENERAL INFORMATION  The surgical removal of skin lesions and tumors is frequently performed by plastic surgeons. Certain skin lesions and skin tumors will not disappear spontaneously, surgical removal is a treatment option.   There are many different techniques for removing skin lesions and skin tumors. ALTERNATIVE TREATMENTS  Alternative forms of management consist of not treating the skin lesion/skin tumor condition. Removal of skin lesions and some superficial skin tumors may be accomplished by other treatments including the use of liquid nitrogen (freezing), lasers, topical medications, and electric cautery. Risks and potential complications are associated with alternative forms of treatment. Deeper tumors cannot be treated by this. RISKS OF SKIN LESION/SKIN TUMOR SURGERY    I have explained to the patient that every surgical procedure involves a certain amount of risk and it is important that an understanding of these risks and the possible complications associated with them is understood. In addition, every procedure has limitations. An individual's choice to undergo a surgical procedure is based on the comparison of the risk to potential benefit. Although some of patients do not experience these complications. Bleeding- It is possible, to experience a bleeding episode during or after surgery. Intraoperative blood transfusions may be required. Should post-operative bleeding occur, it may require an emergency treatment to drain the accumulated blood or blood transfusion. Do not take any aspirin or anti-inflammatory medications for ten days before or after surgery, as this may increase the risk of bleeding. Non-prescription herbs and dietary supplements can increase the risk of surgical bleeding. Hematoma can occur at any time following injury. If blood transfusions are necessary to treat blood loss, there is the risk of blood-related infections such as hepatitis and HIV (AIDS). Heparin medications that are used to prevent blood clots in veins can produce bleeding and decreased blood platelets. Please check with the physician who prescribed that blood thinners such as aspirin Coumadin etc. Prior to stopping them.     Infection- Infection can occur after surgery. Should an infection occur, additional treatment including antibiotics, hospitalization, or additional surgery may be necessary. Scarring- All surgery leaves scars, some more visible than others. Although wound healing after a surgical procedure is expected, abnormal scars may occur within the skin and deeper tissues. Scars may be unattractive and of different color than the surrounding skin tone. Scar appearance may also vary within the same scar. There is the possibility of visible marks from sutures used to close the wound after the removal of skin lesions and tumors. There is the possibility that scars may limit motion and function. In some cases, scars may require surgical revision or treatment. Obesity: If you're BMI is greater than 30 you may have a higher chance of complications. This may include but not limited to wound healing and infections. Also, if you have other medical problems such as diabetes and hypertension it may affect your healing as well as your surgical results in bleeding. Damage to Deeper Structures- There is the potential for injury to deeper structures including nerves, blood vessels, muscles, and lungs (pneumothorax) during any surgical procedure. The potential for this to occur varies according to where on the body surgery is being performed. Injury to deeper structures may be temporary or permanent. Cancer- In some situations, a skin lesion or tumor that appears to be benign may be determined to be cancerous after laboratory analysis. Additional treatments or surgery may be necessary. Recurrence- In some situation, skin lesions and tumors can recur after surgical excision. Additional treatment or secondary surgery may be necessary. Skin Discoloration / Swelling- Some bruising and swelling normally occurs following surgery. The skin in or near the surgical site can appear either lighter or darker than surrounding skin.   Although uncommon, swelling and skin discoloration may persist for long periods of time and, in rare situations, may be permanent. Skin Sensitivity- Itching, tenderness, or exaggerated responses to hot or cold temperatures may occur after surgery. Usually this resolves during healing, but in some situations, it may be chronic, permanent. Pain- You will experience pain after your surgery. Pain of varying intensity and duration may occur and persist after surgery. Chronic pain may occur from nerves becoming trapped in scar tissue. Sutures- Some surgical techniques use deep sutures. You may notice these sutures after your surgery. Sutures may spontaneously poke through the skin, become visible or produce irritation that requires removal.  Delayed Healing- Wound disruption or delayed wound healing is possible. Some areas of the skin may not heal normally and may take a long time to heal.  Some areas of skin may die, requiring frequent dressing changes or further surgery to remove the non-healed tissue. Smokers have a greater risk of skin loss and wound healing complications. Skin Contour Irregularities- Contour irregularities and depressions may occur after surgery. Visible and palpable wrinkling of skin can occur. Residual skin irregularities at the ends of the incisions or dog ears are always a possibility and may require additional surgery. This may improve with time, or it can be surgically corrected. Allergic Reactions- In some cases, local allergies to tape, suture materials and glues, blood products, topical preparations or injected agents have been reported. Serious systemic reactions including shock (anaphylaxis) may occur to drugs used during surgery and prescription medications. Allergic reactions may require additional treatment. Surgical Anesthesia- Both local and general anesthesia involve risk.   There is the possibility of complications, injury, and even death from all forms of surgical anesthesia or sedation. Change in Skin Sensation- It is common to experience diminished (or loss) of skin sensation in areas that have had surgery. Diminished (or complete loss of skin sensation) may not totally resolve. Shock- In rare circumstances, your surgical procedure can cause severe trauma, particularly when multiple or extensive procedures are performed. Although serious complications are infrequent, infections or excessive fluid loss can lead to severe illness and even death. If surgical shock occurs, hospitalization and additional treatment would be necessary. Unsatisfactory Result- There is no guarantee or warranty expressed or implied, on the results that may be obtained. There is the possibility of a poor result from the removal of skin lesions and tumors. This would include risks such as unacceptable visible deformities, loss of function, poor healing, wound disruption, skin death and loss of sensation. You may be disappointed with the results of reconstructive surgery. It may be necessary to perform additional surgery to improve your results. Cardiac and Pulmonary Complications- Surgery, especially longer procedures, may be associated with the formation of, or increase in, blood clots in the venous system. Pulmonary complications may occur secondarily to both blood clots (pulmonary emboli), fat deposits (fat emboli) or partial collapse of the lungs after general anesthesia. Pulmonary and fat emboli can be life-threatening or fatal in some circumstances. Air travel, inactivity and other conditions may increase the incidence of blood clots traveling to the lungs causing a major blood clot that may result in death. It is important to discuss with your physician any past history of blood clots or swollen legs that may contribute to this condition. Cardiac complications are a risk with any surgery and anesthesia, even in patients without symptoms.   If you experience shortness of breath, chest pains, or unusual heart beats, seek medical attention immediately. Should any of these complications occur, you may require hospitalization and additional treatment. Smoking, Second-Hand Smoke Exposure, Nicotine Products (Patch, Gum, Nasal Spray)-   Patients who are currently smoking, use tobacco products, or nicotine products (patch, gum, or nasal spray) are at a greater risk for significant surgical complications of skin dying, delayed healing, and additional scarring. Individuals exposed to second-hand smoke are also at potential risk for similar complications attributable to nicotine exposure. Additionally, smoking may have a significant negative effect on anesthesia and recovery from anesthesia, with coughing and possibly increased bleeding. Individuals who are not exposed to tobacco smoke or nicotine-containing products have a significantly lower risk of this type of complication. It is important to refrain from smoking at least 8-week weeks before and after surgery. This may apply to secondhand smoking. Mental Health Disorders and Surgery- It is important that all patients seeking to undergo surgery have realistic expectations that focus on improvement rather than perfection. Complications or less than satisfactory results are sometimes unavoidable, may require additional surgery and often are stressful. Please openly discuss with your surgeon, prior to surgery, any history that you may have of significant emotional depression or mental health disorders. Although many individuals may benefit psychologically from the results of surgery, effects on mental health cannot be accurately predicted. Medications- There are many adverse reactions that occur as the result of taking over the counter, herbal, and/or prescription medications. Be sure to check with your physician about any drug interactions that may exist with medications which you are already taking.   If you have an adverse reaction, stop the drugs immediately and call your plastic surgeon for further instructions. If the reaction is severe, go immediately to the nearest emergency room. When taking the prescribed pain medications after surgery, realize that they can affect your thought process and coordination. Do not drive, do not operate complex equipment, do not make any important decisions, and do not drink any alcohol while taking these medications. Be sure to take your prescribed medication only as directed. ADDITIONAL SURGERY NECESSARY  Should complications occur, additional surgery or other treatments may be necessary. Even though risks and complications occur infrequently, the risks cited are the ones that are particularly associated with skin lesion and skin tumor removal.  Other complications and risks can occur but are even more uncommon. The practice of medicine and surgery is not an exact science. Although good results are expected, there is no guarantee or warranty expressed or implied, on the results that may be obtained. PATIENT COMPLIANCE   Follow all physician instructions carefully; this is essential for the success of your outcome. It is important that the surgical incisions are not subjected to excessive force, swelling, abrasion, or motion during the time of healing. Protective dressings and drains should not be removed unless instructed by your plastic surgeon. Successful post-operative function depends on both surgery and subsequent care. Physical activity that increases your pulse or heart rate may cause bruising, swelling, fluid accumulation and the need for return to surgery. It is wise to refrain from intimate physical activities after surgery until your physician states it is safe. It is important that you participate in follow-up care, return for aftercare, and promote your recovery after surgery.       HEALTH INSURANCE  Most health insurance companies exclude coverage for cosmetic surgical operations or any complications that might occur from surgery. Please carefully review your health insurance subscriber information pamphlet. Most insurance plans exclude coverage for secondary or revisionary surgery. Your type of surgery will most likely be covered by your insurance company, I went there is no guarantees or warrantees implied or otherwise. The cost of surgery involves several charges for the services provided. The total includes fees charged by your surgeon, the cost of surgical supplies, anesthesia, laboratory tests, and possible outpatient hospital charges, depending on where the surgery is performed. Depending on whether the cost of surgery is covered by an insurance plan, you will be responsible for necessary co-payments, deductibles, and charges not covered. The fees charged for this procedure do not include any potential future costs for additional procedures that you elect to have or require in order to revise, optimize, or complete your outcome. Additional costs may occur should complications develop from the surgery. Secondary surgery or hospital day-surgery charges involved with revision surgery will also be your responsibility. II have also explained to the patient that we may obtain photographs. These images or illustration along with my medical records created in my case may be used for obtaining insurance approval, for use in examination, may assist in education, testing, credentialing and or certifying by [facility]. These images and records may be used to communicate with referring physician. The following information was discussed with the patient, but this is not an all-inclusive-other issue were also discussed with patient. GENERAL INFORMATION  Flap surgery is frequently performed to cover a defect. Flap taken from one area of the body to restore coverage in other area(s). Flaps are also moved to adjacent areas of the body as well.  Flaps help wounds heal that otherwise would not heal adequately. Flaps are useful in situations where there are not adequate subcutaneous tissues present to provide support and blood supply for the skin graft. Flaps are generally classified as to location of transfer. Flaps are generally full thickness tend to be used for specific wound coverage applications when there is full loss of skin and tissue. Flaps are an effective means of assisting wound healing when there has been a loss of skin and tissue due to conditions that involve disease, injuries, or surgical removal of tumors. Some wounds may be too complex to heal without other more involved reconstructive techniques. In some situations, surgical procedure(s) and other treatments may be needed to prepare a wound for a flap. When the flap is moved from one area to the other day will be a defect from the donor area. This may be to loss of function, strength, sensation and other deformities. These deformities include but not limited to external visible scars, defects where the bulk of the muscle tissues no longer available in that area. ALTERNATIVE TREATMENTS  Alternative forms of care consist of not undergoing surgery. Some minor wounds may heal without surgery. In other situations, different forms of treatment such as the transfer of skin and other composite pieces of tissue may be preferable to flaps. Microsurgical tissue transfer may be necessary in situations when ordinary surgical techniques cannot provide for satisfactory tissue to cover a complex wound. Risks and potential complications are associated with alternative surgical forms of treatment. Although some wounds can heal spontaneously, there may be increased risk of unsatisfactory result, scarring, and functional impairment. You always have the option of no reconstruction.     RISKS OF FLAP SURGERY  Every surgical procedure involves a certain amount of risk and it is important that you understand these risks and the possible complications associated with them. In addition, every procedure has limitations. An individual's choice to undergo a surgical procedure is based on the comparison of the risk to potential benefit. Bleeding- It is possible, though unusual, to experience a bleeding episode during or after surgery. Intraoperative blood transfusions may be required. Should post-operative bleeding occur, it may require an emergency treatment to drain the accumulated blood or blood transfusion. Do not take any aspirin or anti-inflammatory medications for ten days before or after surgery, as this may increase the risk of bleeding. Non-prescription herbs and dietary supplements can increase the risk of surgical bleeding. Hematoma can occur at any time following injury. If blood transfusions are necessary to treat blood loss, there is the risk of blood-related infections such as hepatitis and HIV (AIDS). Heparin medications that are used to prevent blood clots in veins can produce bleeding and decreased blood platelets. Seroma- Fluid may accumulate around the tissue expander/implant or surgical sites following surgery, from trauma or vigorous exercise. Additional treatment may be necessary to drain fluid accumulation around tissue expander or implant or surgical site. This may contribute to infection, capsular contracture, or other problems. Infection- Infections can occur after any type of surgery, it may appear in the immediate post-operative period or at any time following surgery. Subacute or chronic infections may be difficult to diagnose. Should an infection occur, treatment including antibiotics. You may need additional surgery. In extremely rare instances, life-threatening infections, including toxic shock syndrome have been noted after tissue expander/implant/nasal packing or any implantable device or packing.      Individuals with an active infection in their body or weakened immune system (currently receiving or have received chemotherapy or drugs to suppress the immune system), may be at greater risk for infection. Inability to Heal- Conditions that involve disease, injuries including burns, or surgical removal of tumors/cancers can produce severe wounds. Flaps require adequate blood supply for survival.  Areas of the body where there is inadequate blood supply due to injury, disease states, or the effect of radiation therapy, may not be capable of providing adequate blood supply for flap survival.  Flaps are also vulnerable to loss in disease situations where there is a propensity for chronic swelling or vascular insufficiency disorders. Some wounds may be of the extent and severity that flaps cannot produce closure of the wound and healing. More involved reconstructive surgical procedures may be necessary. Scarring- All surgery leaves scars, some more visible than others. Although wound healing after a surgical procedure is expected, abnormal scars may occur within the skin and deeper tissues. Scars may be unattractive and of different color than the surrounding skin tone. Scar appearance may also vary within the same scar. In some cases, scars may require surgical revision or treatment. Depending on the location of the scar it may cause restriction in the movement of joints. These Scars may require resection and therapy post operatively. Obesity: If you're BMI is greater than 30 you may have a higher chance of complications. This may include but not limited to wound healing and infections. Also, if you have other medical problems such as diabetes and hypertension it may affect your healing as well as your surgical results in bleeding. Firmness:  Excessive firmness can occur after surgery due to the internal scarring. This can also be due to fat necrosis. This occurrence is not predictable.   Additional treatment or surgery may be required to treat this.    Skin Sensation- Diminished (or loss) of skin sensation in the donor location for the flap as well as the location where the flap is placed may occur and not totally resolve after flap surgery. Skin flaps generally do not regain normal skin sensation. Injuries may occur secondary to this lack of sensation if the flap is subjected to excessive heat, cold, or physical force. Flaps placed in areas of decreased sensation are prone to injury and loss. Care must be given to avoid injury to these areas or complications may occur. Skin Contour Irregularities- Contour irregularities and depressions may occur after flap surgery. Visible and palpable wrinkling of skin can occur. Residual skin irregularities at the ends of the incisions or dog ears are always a possibility and may require additional surgery. This may improve with time, or it can be surgically corrected. Delayed Healing- Wound disruption or delayed wound healing is possible. Scarring and inadequate healing may occur in the location where the flap is taken for transfer to other parts of the body. Healing of the donor area may take unacceptably long periods of time. The donor area once healed may be prone to abrasions. The flap may heal abnormally or slowly. Some areas of the flap may die, requiring frequent dressing changes or further surgery to remove the non-healed tissue. Smokers have a greater risk of skin loss and wound healing complications. Skin Discoloration / Swelling- Some bruising and swelling normally occurs following surgery. Flaps and the flap donor location can undergo changes in color. It is possible to have these areas be either darker or lighter than surrounding skin. These changes can be permanent. Once a flap is taken that muscle, skin, bone and tendon is permanently removed from that location, and there will be loss of function associated with the structures taken.    Skin Sensitivity- Itching or other skin could potentially break or damage any device or cause bleeding/seroma. Buried Surgical Staples / Sutures- Sutures and staples used to hold flap in place could potentially become buried under the skin during healing. Sutures may spontaneously poke through the skin, become visible or produce irritation that requires removal.  Additional surgery may be necessary to remove buried staples and sutures. Lacks flap Durability- Some flaps do not have the normal padding and durability of normal, undamaged skin. Flaps lack the normal ability of skin to resist ordinary abrasions and injuries. Shock- In some circumstances, your surgical procedure can cause severe trauma, particularly when multiple or extensive procedures are performed. Although serious complications are infrequent, infections or excessive fluid loss can lead to severe illness and even death. If surgical shock occurs, hospitalization and additional treatment would be necessary. Unsatisfactory Result- Although good results are expected, there is no guarantee or warranty expressed or implied, on the results that may be obtained. There is the possibility of a poor result from flap surgery. This would include risks such as unacceptable visible deformities, loss of function, poor healing, wound disruption, skin and soft tissue loss, chronic pain and loss of sensation. There may be unacceptable cosmetic deformities from flaps placed on visible portions of the body or in the flap donor areas. Abnormal color of flap may occur depending on its origin. It may be necessary to perform additional surgery to improve your results. Cardiac and Pulmonary Complications- Surgery, especially longer procedures, may be associated with the formation of, or increase in, blood clots in the venous system.   Pulmonary complications may occur secondarily to both blood clots (pulmonary emboli), fat deposits (fat emboli) or partial collapse of the lungs after general anesthesia. Pulmonary and fat emboli can be life-threatening or fatal in some circumstances. Air travel, inactivity and other conditions may increase the incidence of blood clots traveling to the lungs causing a major blood clot that may result in death. It is important to discuss with your physician any past history of blood clots or swollen legs that may contribute to this condition. Cardiac complications are a risk with any surgery and anesthesia, even in patients without symptoms. Should any of these complications occur, you may require hospitalization and additional treatment. Mental Health Disorders and Surgery- It is important that all patients seeking to undergo surgery have realistic expectations that focus on improvement rather than perfection. Complications or less than satisfactory results are sometimes unavoidable, may require additional surgery and often are stressful. Please openly discuss with your surgeon, prior to surgery, any history that you may have of significant emotional depression or mental health disorders. Although many individuals may benefit psychologically from the results of surgery, effects on mental health cannot be accurately predicted. Smoking, Second-Hand Smoke Exposure, Nicotine Products (Patch, Gum, Nasal Spray)-   Patients who are currently smoking, use tobacco products, or nicotine products (patch, gum, or nasal spray) are at a greater risk for significant surgical complications of skin dying, increase risk of partial or complete flap loss, delayed healing, and additional scarring. Individuals exposed to second-hand smoke are also at potential risk for similar complications attributable to nicotine exposure. Additionally, smoking may have a significant negative effect on anesthesia and recovery from anesthesia, with coughing and possibly increased bleeding.   Individuals who are not exposed to tobacco smoke or nicotine-containing products have a significantly lower risk of this type of complication  These images or illustration along with my medical records created in my case may be used for obtaining insurance approval, for use in examination, may assist in education, testing, credentialing and or certifying by [facility]. These images and records may be used to communicate with referring physician. Medications- There are many adverse reactions that occur as the result of taking over the counter, herbal, and/or prescription medications. Be sure to check with your physician about any drug interactions that may exist with medications which you are already taking. If you have an adverse reaction, stop the drugs immediately and call for further instructions. If the reaction is severe, go immediately to the nearest emergency room. When taking the prescribed pain medications after surgery, realize that they can affect your thought process and coordination. Do not drive, do not operate complex equipment, do not make any important decisions, and do not drink any alcohol while taking these medications. Be sure to take your prescribed medication only as directed. PATIENT COMPLIANCE   Follow all physician instructions carefully; this is essential for the success of your outcome. It is important that the flap is not subjected to excessive force, swelling, abrasion, or motion during the time of healing or flap loss may occur. flap donor locations are similarly vulnerable to injury during the healing process. Protective dressings and drains should not be removed unless instructed by your plastic surgeon. Successful post-operative function depends on both surgery and subsequent rehabilitation. You may be advised to wear compressive garments, casts to control both swelling and protect the flap following surgery. Personal and vocational activity needs to be restricted.   Physical activity that increases your pulse or heart rate may cause bruising, swelling, fluid accumulation and the need for return to surgery. It is wise to refrain from intimate physical activities after surgery until your physician states it is safe. It is important that you participate in follow-up care, return for aftercare, and promote your recovery after surgery. ADDITIONAL SURGERY NECESSARY  Should complications occur, additional surgery or other treatments may be necessary. Even though risks and complications occur infrequently, the risks cited are particularly associated with flap surgery. Other complications and risks can occur. The practice of medicine and surgery is not an exact science. Although good results are expected, there is no guarantee or warranty expressed or implied on the results that may be obtained. HEALTH INSURANCE  Most health insurance companies exclude coverage for cosmetic surgical operations or any complications that might occur from surgery. Please carefully review your health insurance subscriber information pamphlet. Most insurance plans exclude coverage for secondary or reversionary surgery. FINANCIAL RESPONSIBILITIES  The cost of surgery involves several charges for the services provided. The total includes fees charged by the surgeon, the cost of surgical supplies, anesthesia, laboratory tests, and possible outpatient hospital charge. Depending on whether the cost of surgery is covered by an insurance plan, you will be responsible for necessary co-payments, deductibles, and charges not covered. The fees charged for this procedure do not include any potential future costs for additional procedures that you elect to have or require in order to revise, optimize, or complete your outcome. Additional costs may occur should complications develop from the surgery. Secondary surgery or hospital day-surgery charges involved with revision surgery will also be your responsibility.   The following information was discussed with the patient, but this is not an all-inclusive-other issues were also discussed with patient. GENERAL INFORMATION  Skin graft surgery is frequently performed by plastic surgeons by using skin taken from one area of the body to restore skin coverage in other area(s). Skin grafts help wounds heal that otherwise would not heal adequately. Skin grafts are useful in situations where there is adequate subcutaneous tissues present to provide support and blood supply for the skin graft. Skin grafts are generally classified as to the thickness of the skin that is being grafted from one part of the body to some other region. A split-thickness skin graft does not comprise the entire thickness of skin. The donor area where the split-thickness graft is taken can heal on its own. Large areas of the body can be used for split-thickness skin grafts. The full thickness skin graft is different as it involves the full thickness of skin and deeper tissues. Full-thickness grafts tend to be used for specific wound coverage applications when thicker skin is needed. The donor area for the full thickness graft is limited in size as full-thickness skin graft donor sites cannot be used more than one time. Skin grafts are an effective means of assisting wound healing when there has been a loss of skin due to conditions that involve disease, injuries including burns, or surgical removal of tumors. Some wounds may be too complex to heal without other more involved reconstructive techniques. In some situations, surgical procedure(s) and other treatments (dressing changes and hydrotherapy) may be needed to prepare a wound for a skin graft. ALTERNATIVE TREATMENTS  Alternative forms of care consist of not undergoing surgery. Some minor wounds may heal without surgery. In other situations, different forms of treatment such as the transfer of skin and other composite pieces of tissue may be preferable to skin grafts.   Microsurgical tissue transfer may be necessary in situations when ordinary surgical techniques cannot provide for satisfactory tissue to cover a complex wound. Risks and potential complications are associated with alternative surgical forms of treatment. Although wounds can heal spontaneously, there may be increased risk of unsatisfactory result, scarring, and functional impairment. RISKS OF SKIN GRAFT SURGERY  Every surgical procedure involves a certain amount of risk and it is important that you understand these risks and the possible complications associated with them. In addition, every procedure has limitations. An individual's choice to undergo a surgical procedure is based on the comparison of the risk to potential benefit. Although the majority of patients do not experience these complications, you should discuss each of them with your plastic surgeon to make sure you understand all possible consequences of skin graft surgery. Bleeding- It is possible, though unusual, to experience a bleeding episode during or after surgery. Intraoperative blood transfusions may be required. Should post-operative bleeding occur, it may require an emergency treatment to drain the accumulated blood or blood transfusion. Do not take any aspirin or anti-inflammatory medications for ten days before or after surgery, as this may increase the risk of bleeding. Non-prescription herbs and dietary supplements can increase the risk of surgical bleeding. Hematoma can occur at any time following injury. If blood transfusions are necessary to treat blood loss, there is the risk of blood-related infections such as hepatitis and HIV (AIDS). Heparin medications that are used to prevent blood clots in veins can produce bleeding and decreased blood platelets. Infection- Infections after skin graft surgery may occur. There is the possibility of skin graft failure or scarring from an infection.   Should an infection occur, additional treatment including antibiotics, hospitalization, or additional surgery may be necessary. Inability to Heal- Conditions that involve disease, injuries including burns, or surgical removal of tumors can produce severe wounds. Skin grafts require adequate blood supply for survival.  Areas of the body where there is inadequate blood supply due to injury, disease states, or the effect of radiation therapy, may not be capable of providing adequate blood supply for skin graft survival.  Skin grafts are also vulnerable to loss in disease situations where there is a propensity for chronic swelling or vascular insufficiency disorders. Some wounds may be of the extent and severity that skin grafts cannot produce closure of the wound and healing. More involved reconstructive surgical procedures may be necessary. Scarring- All surgery leaves scars, some more visible than others. Although good wound healing after a surgical procedure is expected, abnormal scars may occur within the skin and deeper tissues. Scars may be unattractive and of different color than the surrounding skin tone. Scar appearance may also vary within the same scar. Special compressive garments may be needed to help control scarring. In some cases scars may require surgical revision or treatment. Skin Sensation- Diminished (or loss) of skin sensation in the donor location for the graft as well as the location where the graft is placed may occur and not totally resolve after skin graft surgery. Skin grafts generally do not regain normal skin sensation. Injuries may occur secondary to this lack of sensation if the skin graft is subjected to excessive heat, cold, or physical force. Skin grafts placed in areas of decreased sensation are prone to injury and loss. Care must be given to avoid injury to these areas or complications may occur. Skin Contour Irregularities- Contour irregularities and depressions may occur after skin graft surgery. Visible and palpable wrinkling of skin can occur. If a skin graft has been processed in a graft meshing device, it may heal with a pattern. Residual skin irregularities at the ends of the incisions or dog ears are always a possibility and may require additional surgery. This may improve with time, or it can be surgically corrected. Delayed Healing- Wound disruption or delayed wound healing is possible. Scarring and inadequate healing may occur in the location where the skin graft is taken for transfer to other parts of the body. Healing of the donor area may take unacceptably long periods of time. The donor area once healed may be prone to abrasions. The skin graft may heal abnormally or slowly. Some areas of skin may die, requiring frequent dressing changes or further surgery to remove the non-healed tissue. Smokers have a greater risk of skin loss and wound healing complications. Skin Discoloration / Swelling- Some bruising and swelling normally occurs following surgery. Skin grafts and the skin graft donor location can undergo changes in color. It is possible to have these areas be either darker or lighter than surrounding skin. These changes can be permanent. Skin Sensitivity- Itching is a common complaint in both the skin graft donor location and the recipient location. Graft abrasion may occur from scratching. Additionally, these areas may have exaggerated responses to hot or cold temperatures. Usually this resolves during healing, but it may be chronic. Inability to Restore Function- In some situations, skin grafts cannot restore the normal function of intact skin or undamaged deeper structures. Although it may be possible to produce healing with a skin graft, there can be a loss of function. Additional treatment and surgery may be necessary. Surgical Anesthesia- Both local and general anesthesia involve risk.   There is the possibility of complications, injury, and even death from all forms of surgical anesthesia or sedation. Damage to Deeper Structures- There is the potential for injury to deeper structures including nerves, blood vessels, muscles, and lungs (pneumothorax) during any surgical procedure. The potential for this to occur varies according to where on the body surgery is being performed. Injury to deeper structures may be temporary or permanent. Allergic Reactions- In rare cases, local allergies to tape, suture materials and glues, blood products, topical preparations or injected agents have been reported. Serious systemic reactions including shock (anaphylaxis) may occur to drugs used during surgery and prescription medications. Allergic reactions may require additional treatment. Skin Cancer in Skin Grafts- Skin cancer can rarely occur in skin grafts. Pain- You will experience pain after your surgery. Pain of varying intensity and duration may occur and persist after surgery. Chronic pain may occur very infrequently from nerves becoming trapped in scar tissue or from other causes after skin graft surgery. Buried Surgical Staples / Sutures- Sutures and staples used to hold skin grafts in place can potentially become buried under the skin during healing. Sutures may spontaneously poke through the skin, become visible or produce irritation that requires removal.  Additional surgery may be necessary to remove buried staples and sutures. Lacks of Graft Durability- Skin grafts do not have the normal padding and durability of normal, undamaged skin. Skin grafts lack the normal ability of skin to resist ordinary abrasions and injuries. Shock- In rare circumstances, your surgical procedure can cause severe trauma, particularly when multiple or extensive procedures are performed. Although serious complications are infrequent, infections or excessive fluid loss can lead to severe illness and even death.   If surgical shock occurs, hospitalization and additional treatment diabetes and hypertension it may affect your healing as well as your surgical results in bleeding. .   Mental Health Disorders and Surgery- It is important that all patients seeking to undergo surgery have realistic expectations that focus on improvement rather than perfection. Complications or less than satisfactory results are sometimes unavoidable, may require additional surgery and often are stressful. Please openly discuss with your surgeon, prior to surgery, any history that you may have of significant emotional depression or mental health disorders. Although many individuals may benefit psychologically from the results of surgery, effects on mental health cannot be accurately predicted. Smoking, Second-Hand Smoke Exposure, Nicotine Products (Patch, Gum, Nasal Spray)-   Patients who are currently smoking, use tobacco products, or nicotine products (patch, gum, or nasal spray) are at a greater risk for significant surgical complications of skin dying, delayed healing, and additional scarring. Individuals exposed to second-hand smoke are also at potential risk for similar complications attributable to nicotine exposure. Additionally, smoking may have a significant negative effect on anesthesia and recovery from anesthesia, with coughing and possibly increased bleeding. Smokers also how significantly higher risk of pulmonary complications after surgery including but not limited to pneumonia. It is important to refrain from smoking at least 6 weeks before surgery and until your physician states it is safe to return, if desired. Medications- There are many adverse reactions that occur as the result of taking over the counter, herbal, and/or prescription medications. Be sure to check with your physician about any drug interactions that may exist with medications which you are already taking.   If you have an adverse reaction, stop the drugs immediately and call your plastic surgeon for further instructions. If the reaction is severe, go immediately to the nearest emergency room. When taking the prescribed pain medications after surgery, realize that they can affect your thought process and coordination. Do not drive, do not operate complex equipment, do not make any important decisions, and do not drink any alcohol while taking these medications. Be sure to take your prescribed medication only as directed. PATIENT COMPLIANCE   Follow all physician instructions carefully; this is essential for the success of your outcome. It is important that the skin graft is not subjected to excessive force, swelling, abrasion, or motion during the time of healing or graft loss may occur. Skin graft donor locations are similarly vulnerable to injury during the healing process. Protective dressings and drains should not be removed unless instructed by your plastic surgeon. Successful post-operative function depends on both surgery and subsequent rehabilitation. You may be advised to wear compressive garments to control both swelling and scarring following skin graft surgery. Personal and vocational activity needs to be restricted. Physical activity that increases your pulse or heart rate may cause bruising, swelling, fluid accumulation and the need for return to surgery. It is wise to refrain from intimate physical activities after surgery until your physician states it is safe. It is important that you participate in follow-up care, return for aftercare, and promote your recovery after surgery. ADDITIONAL SURGERY NECESSARY  Should complications occur, additional surgery or other treatments may be necessary. Even though risks and complications occur infrequently, the risks cited are particularly associated with skin graft surgery. Other complications and risks can occur but are even more uncommon. The practice of medicine and surgery is not an exact science.   Although good results are expected, there is no guarantee or warranty expressed or implied on the results that may be obtained. HEALTH INSURANCE  Most health insurance tend to cover reconstructive procedures, however there is no guarantees or warrantees that the procedure will be covered by your insurance company. However, coverage for cosmetic surgical operations or any complications that might occur from surgery. Please carefully review your health insurance subscriber information pamphlet. Most insurance plans exclude coverage for secondary or revisionary surgery. FINANCIAL RESPONSIBILITIES  The cost of surgery involves several charges for the services provided. The total includes fees charged by the surgeon, the cost of surgical supplies, anesthesia, laboratory tests, and hospital charges. Depending on whether the cost of surgery is covered by an insurance plan, you will be responsible for necessary co-payments, deductibles, and charges not covered. The fees charged for this procedure do not include any potential future costs for additional procedures that you elect to have or require in order to revise, optimize, or complete your outcome. Additional costs may occur should complications develop from the surgery. Secondary surgery or hospital day-surgery charges involved with revision surgery will also be your responsibility.       Electronically signed by:  Eduardo Weiss MD 6/8/2022

## 2022-06-08 NOTE — LETTER
[facility]  [address] [place] [address]  Phone: [phone]  Fax: [phone]           Arlyn Franklin MD      June 8, 2022     Patient: Nadya Paul   MR Number: [id number]   YOB: 1950   Date of Visit: 6/8/2022       Dear Dr. Bro Iqbal: Thank you for referring Burnsavannah Humberto to me for evaluation/treatment. Below are the relevant portions of my assessment and plan of care. Plan:  Patient with a lesion on the left face. 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 Patient is on Plavix an in office biopsy could not be performed. At this time we will plan to do an excision in the OR with frozen sections. The risk of the procedure was discussed with patient detail. All questions were answered. We also discussed possible flap reconstruction and possible skin graft. If you have questions, please do not hesitate to call me. I look forward to following Gregoria Jett along with you.     Sincerely,        Arlyn Franklin MD     providers:  LIZETH Calvert - CNP  [address] [place]  [facility] [address] [address]  [place]

## 2022-06-20 ENCOUNTER — TELEPHONE (OUTPATIENT)
Dept: SURGERY | Age: 72
End: 2022-06-20

## 2022-06-20 NOTE — TELEPHONE ENCOUNTER
Surgery Scheduled/[place]  Excision lesion LEFT face/frozen section  Complex closure  7/8/22  9:00 am  [facility]    PAT - Phone call    Patient and daughter April notified by phone/mail. Patient advised to stop Plavix as of 7/3/22.

## 2022-06-29 NOTE — PROGRESS NOTES
DAY OF SURGERY/PROCEDURE  GUIDELINES    As a patient at the [facility], you can expect quality medical and nursing care that is centered on your individual needs. It is our goal to make your surgical experience as comfortable and excellent as possible.  ________________________________________________________________________    The following instructions are general guidelines, if any information on this sheet is different from what your doctor has instructed you to do, please follow your doctor's instructions. · Please arrive on 7/8 @ 730      · Enter through entrance C. Check in at registration     · Upon arrival you will be taken to the pre-operative area to get ready for surgery, your family will stay in the waiting room and visit with you once you are ready for surgery. Due to special limitations please limit visitation to 1-2 members of your family at a time. When it is time for surgery your family will return to the waiting room. · Nothing to eat, drink, smoke, suck or chew after midnight (no water, gum, mints, cigarettes, cigars, pipes, snuff, chewing tobacco, etc.) or your surgery may be canceled. · Take a shower or bath on the morning of your surgery/procedure (Hibiclens if directed)    · Brush your teeth, but do not swallow any water    · IN CASE OF ILLNESS - If you have a cold or flu symptoms (high fever, runny nose, sore throat, cough, etc.) rash, nausea, vomiting, loose stools, and/or recent contact with someone who has a contagious disease (chick pox, measles, etc.) please call your doctor before coming to the surgery center    · Take a small sip of water with heart, blood pressure, and/or seizure medication the morning of surgery.  metoprolol    · If applicable bring your:  · Inhaler (s)  · Hearing aid(s)  · Eyeglasses and Case (If you wear contacts they have to be removed before surgery, bring case and solution)  · CPAP     · DO NOT take anticoagulants (blood thinners, aspirin or aspirin-containing products) as instructed by your physician. · DO NOT take any diabetic pills or insulin morning of your surgery. · Wear loose, comfortable clothing that is easy to put on and take off. They will remain in post-op with the nurse. · If you will be returning home the same day as your surgery, you will need to have a responsible adult (25years of age or older) present to drive you home. You will need someone stay with you at home for the first 24 hours following your surgery. This is due to the anesthesia and the medication given to you during surgery and recovery.

## 2022-06-30 ENCOUNTER — HOSPITAL ENCOUNTER (OUTPATIENT)
Age: 72
Discharge: HOME | End: 2022-06-30
Payer: MEDICARE

## 2022-06-30 DIAGNOSIS — Z01.818 PRE-OP TESTING: ICD-10-CM

## 2022-06-30 LAB
ANION GAP SERPL CALCULATED.3IONS-SCNC: 10 MMOL/L (ref 9–17)
BUN BLDV-MCNC: 15 MG/DL (ref 8–23)
CALCIUM SERPL-MCNC: 9.2 MG/DL (ref 8.6–10.4)
CHLORIDE BLD-SCNC: 103 MMOL/L (ref 98–107)
CO2: 24 MMOL/L (ref 20–31)
CREAT SERPL-MCNC: 1.06 MG/DL (ref 0.7–1.2)
EKG ATRIAL RATE: 57 BPM
EKG P AXIS: 56 DEGREES
EKG P-R INTERVAL: 182 MS
EKG Q-T INTERVAL: 444 MS
EKG QRS DURATION: 96 MS
EKG QTC CALCULATION (BAZETT): 432 MS
EKG R AXIS: 73 DEGREES
EKG T AXIS: -35 DEGREES
EKG VENTRICULAR RATE: 57 BPM
GFR AFRICAN AMERICAN: >60 ML/MIN
GFR NON-AFRICAN AMERICAN: >60 ML/MIN
GFR SERPL CREATININE-BSD FRML MDRD: ABNORMAL ML/MIN/{1.73_M2}
GLUCOSE BLD-MCNC: 108 MG/DL (ref 70–99)
HCT VFR BLD CALC: 43.7 % (ref 40.7–50.3)
HEMOGLOBIN: 13.8 G/DL (ref 13–17)
MCH RBC QN AUTO: 30.2 PG (ref 25.2–33.5)
MCHC RBC AUTO-ENTMCNC: 31.6 G/DL (ref 28.4–34.8)
MCV RBC AUTO: 95.6 FL (ref 82.6–102.9)
NRBC AUTOMATED: 0 PER 100 WBC
PDW BLD-RTO: 12.8 % (ref 11.8–14.4)
PLATELET # BLD: 122 K/UL (ref 138–453)
PMV BLD AUTO: 11.5 FL (ref 8.1–13.5)
POTASSIUM SERPL-SCNC: 4.5 MMOL/L (ref 3.7–5.3)
RBC # BLD: 4.57 M/UL (ref 4.21–5.77)
SODIUM BLD-SCNC: 137 MMOL/L (ref 135–144)
WBC # BLD: 25.2 K/UL (ref 3.5–11.3)

## 2022-06-30 PROCEDURE — 85027 COMPLETE CBC AUTOMATED: CPT

## 2022-06-30 PROCEDURE — 80048 BASIC METABOLIC PNL TOTAL CA: CPT

## 2022-06-30 PROCEDURE — 36415 COLL VENOUS BLD VENIPUNCTURE: CPT

## 2022-06-30 PROCEDURE — 93005 ELECTROCARDIOGRAM TRACING: CPT | Performed by: ANESTHESIOLOGY

## 2022-07-01 ENCOUNTER — ANESTHESIA EVENT (OUTPATIENT)
Dept: OPERATING ROOM | Age: 72
End: 2022-07-01
Payer: MEDICARE

## 2022-07-06 ENCOUNTER — HOSPITAL ENCOUNTER (OUTPATIENT)
Age: 72
Setting detail: SPECIMEN
Discharge: HOME | End: 2022-07-06
Payer: MEDICARE

## 2022-07-06 LAB
CHOLESTEROL/HDL RATIO: 3.2
CHOLESTEROL: 141 MG/DL
HDLC SERPL-MCNC: 44 MG/DL
LDL CHOLESTEROL: 73 MG/DL (ref 0–130)
TRIGL SERPL-MCNC: 122 MG/DL

## 2022-07-06 PROCEDURE — 36415 COLL VENOUS BLD VENIPUNCTURE: CPT

## 2022-07-06 PROCEDURE — 80061 LIPID PANEL: CPT

## 2022-07-08 ENCOUNTER — TELEPHONE (OUTPATIENT)
Dept: FAMILY MEDICINE CLINIC | Age: 72
End: 2022-07-08

## 2022-07-08 ENCOUNTER — HOSPITAL ENCOUNTER (OUTPATIENT)
Age: 72
Setting detail: OUTPATIENT SURGERY
Discharge: HOME | End: 2022-07-08
Attending: PLASTIC SURGERY | Admitting: PLASTIC SURGERY
Payer: MEDICARE

## 2022-07-08 ENCOUNTER — ANESTHESIA (OUTPATIENT)
Dept: OPERATING ROOM | Age: 72
End: 2022-07-08
Payer: MEDICARE

## 2022-07-08 VITALS
SYSTOLIC BLOOD PRESSURE: 146 MMHG | HEART RATE: 43 BPM | WEIGHT: 199 LBS | HEIGHT: 70 IN | OXYGEN SATURATION: 99 % | DIASTOLIC BLOOD PRESSURE: 51 MMHG | RESPIRATION RATE: 11 BRPM | BODY MASS INDEX: 28.49 KG/M2 | TEMPERATURE: 96.7 F

## 2022-07-08 DIAGNOSIS — D48.9 NEOPLASM OF UNCERTAIN BEHAVIOR: ICD-10-CM

## 2022-07-08 DIAGNOSIS — C44.92 SQUAMOUS CELL SKIN CANCER: Primary | ICD-10-CM

## 2022-07-08 DIAGNOSIS — D48.5 NEOPLASM OF UNCERTAIN BEHAVIOR OF SKIN: ICD-10-CM

## 2022-07-08 DIAGNOSIS — Z01.818 PRE-OP TESTING: Primary | ICD-10-CM

## 2022-07-08 PROCEDURE — 7100000011 HC PHASE II RECOVERY - ADDTL 15 MIN: Performed by: PLASTIC SURGERY

## 2022-07-08 PROCEDURE — 2500000003 HC RX 250 WO HCPCS: Performed by: PLASTIC SURGERY

## 2022-07-08 PROCEDURE — 7100000010 HC PHASE II RECOVERY - FIRST 15 MIN: Performed by: PLASTIC SURGERY

## 2022-07-08 PROCEDURE — 2580000003 HC RX 258: Performed by: ANESTHESIOLOGY

## 2022-07-08 PROCEDURE — 21014 EXC FACE TUM DEEP 2 CM/>: CPT | Performed by: PLASTIC SURGERY

## 2022-07-08 PROCEDURE — 88331 PATH CONSLTJ SURG 1 BLK 1SPC: CPT

## 2022-07-08 PROCEDURE — 3600000012 HC SURGERY LEVEL 2 ADDTL 15MIN: Performed by: PLASTIC SURGERY

## 2022-07-08 PROCEDURE — 3700000000 HC ANESTHESIA ATTENDED CARE: Performed by: PLASTIC SURGERY

## 2022-07-08 PROCEDURE — 2500000003 HC RX 250 WO HCPCS: Performed by: SPECIALIST

## 2022-07-08 PROCEDURE — 88332 PATH CONSLTJ SURG EA ADD BLK: CPT

## 2022-07-08 PROCEDURE — 2709999900 HC NON-CHARGEABLE SUPPLY: Performed by: PLASTIC SURGERY

## 2022-07-08 PROCEDURE — 88305 TISSUE EXAM BY PATHOLOGIST: CPT

## 2022-07-08 PROCEDURE — 88341 IMHCHEM/IMCYTCHM EA ADD ANTB: CPT

## 2022-07-08 PROCEDURE — 88342 IMHCHEM/IMCYTCHM 1ST ANTB: CPT

## 2022-07-08 PROCEDURE — 15733 MUSC MYOQ/FSCQ FLP H&N PEDCL: CPT | Performed by: PLASTIC SURGERY

## 2022-07-08 PROCEDURE — 6360000002 HC RX W HCPCS: Performed by: SPECIALIST

## 2022-07-08 PROCEDURE — 3700000001 HC ADD 15 MINUTES (ANESTHESIA): Performed by: PLASTIC SURGERY

## 2022-07-08 PROCEDURE — 3600000002 HC SURGERY LEVEL 2 BASE: Performed by: PLASTIC SURGERY

## 2022-07-08 RX ORDER — PROPOFOL 10 MG/ML
INJECTION, EMULSION INTRAVENOUS CONTINUOUS PRN
Status: DISCONTINUED | OUTPATIENT
Start: 2022-07-08 | End: 2022-07-08 | Stop reason: SDUPTHER

## 2022-07-08 RX ORDER — SODIUM CHLORIDE 0.9 % (FLUSH) 0.9 %
5-40 SYRINGE (ML) INJECTION PRN
Status: DISCONTINUED | OUTPATIENT
Start: 2022-07-08 | End: 2022-07-08 | Stop reason: HOSPADM

## 2022-07-08 RX ORDER — FENTANYL CITRATE 50 UG/ML
INJECTION, SOLUTION INTRAMUSCULAR; INTRAVENOUS PRN
Status: DISCONTINUED | OUTPATIENT
Start: 2022-07-08 | End: 2022-07-08 | Stop reason: SDUPTHER

## 2022-07-08 RX ORDER — CEPHALEXIN 500 MG/1
500 CAPSULE ORAL 4 TIMES DAILY
Qty: 28 CAPSULE | Refills: 0 | Status: SHIPPED | OUTPATIENT
Start: 2022-07-08 | End: 2022-07-15

## 2022-07-08 RX ORDER — SODIUM CHLORIDE 9 MG/ML
INJECTION, SOLUTION INTRAVENOUS PRN
Status: DISCONTINUED | OUTPATIENT
Start: 2022-07-08 | End: 2022-07-08 | Stop reason: HOSPADM

## 2022-07-08 RX ORDER — LIDOCAINE HYDROCHLORIDE AND EPINEPHRINE 10; 10 MG/ML; UG/ML
INJECTION, SOLUTION INFILTRATION; PERINEURAL
Status: DISCONTINUED
Start: 2022-07-08 | End: 2022-07-08 | Stop reason: HOSPADM

## 2022-07-08 RX ORDER — MORPHINE SULFATE 1 MG/ML
1 INJECTION, SOLUTION EPIDURAL; INTRATHECAL; INTRAVENOUS EVERY 5 MIN PRN
Status: DISCONTINUED | OUTPATIENT
Start: 2022-07-08 | End: 2022-07-08 | Stop reason: HOSPADM

## 2022-07-08 RX ORDER — MIDAZOLAM HYDROCHLORIDE 1 MG/ML
INJECTION INTRAMUSCULAR; INTRAVENOUS PRN
Status: DISCONTINUED | OUTPATIENT
Start: 2022-07-08 | End: 2022-07-08 | Stop reason: SDUPTHER

## 2022-07-08 RX ORDER — LIDOCAINE HYDROCHLORIDE 10 MG/ML
INJECTION, SOLUTION EPIDURAL; INFILTRATION; INTRACAUDAL; PERINEURAL PRN
Status: DISCONTINUED | OUTPATIENT
Start: 2022-07-08 | End: 2022-07-08 | Stop reason: SDUPTHER

## 2022-07-08 RX ORDER — ONDANSETRON 2 MG/ML
4 INJECTION INTRAMUSCULAR; INTRAVENOUS
Status: DISCONTINUED | OUTPATIENT
Start: 2022-07-08 | End: 2022-07-08 | Stop reason: HOSPADM

## 2022-07-08 RX ORDER — BALANCED SALT SOLUTION ENRICHED WITH BICARBONATE, DEXTROSE, AND GLUTATHIONE
KIT INTRAOCULAR
Status: DISCONTINUED
Start: 2022-07-08 | End: 2022-07-08 | Stop reason: WASHOUT

## 2022-07-08 RX ORDER — MEPERIDINE HYDROCHLORIDE 50 MG/ML
12.5 INJECTION INTRAMUSCULAR; INTRAVENOUS; SUBCUTANEOUS ONCE
Status: DISCONTINUED | OUTPATIENT
Start: 2022-07-08 | End: 2022-07-08 | Stop reason: HOSPADM

## 2022-07-08 RX ORDER — GINSENG 100 MG
CAPSULE ORAL
Status: DISCONTINUED
Start: 2022-07-08 | End: 2022-07-08 | Stop reason: HOSPADM

## 2022-07-08 RX ORDER — SODIUM CHLORIDE 0.9 % (FLUSH) 0.9 %
5-40 SYRINGE (ML) INJECTION EVERY 12 HOURS SCHEDULED
Status: DISCONTINUED | OUTPATIENT
Start: 2022-07-08 | End: 2022-07-08 | Stop reason: HOSPADM

## 2022-07-08 RX ORDER — DIPHENHYDRAMINE HYDROCHLORIDE 50 MG/ML
12.5 INJECTION INTRAMUSCULAR; INTRAVENOUS
Status: DISCONTINUED | OUTPATIENT
Start: 2022-07-08 | End: 2022-07-08 | Stop reason: HOSPADM

## 2022-07-08 RX ORDER — LIDOCAINE HYDROCHLORIDE 10 MG/ML
1 INJECTION, SOLUTION EPIDURAL; INFILTRATION; INTRACAUDAL; PERINEURAL
Status: DISCONTINUED | OUTPATIENT
Start: 2022-07-08 | End: 2022-07-08 | Stop reason: HOSPADM

## 2022-07-08 RX ORDER — HYDROCODONE BITARTRATE AND ACETAMINOPHEN 5; 325 MG/1; MG/1
1 TABLET ORAL EVERY 4 HOURS PRN
Qty: 18 TABLET | Refills: 0 | Status: SHIPPED | OUTPATIENT
Start: 2022-07-08 | End: 2022-07-11

## 2022-07-08 RX ORDER — LIDOCAINE HYDROCHLORIDE AND EPINEPHRINE 10; 10 MG/ML; UG/ML
INJECTION, SOLUTION INFILTRATION; PERINEURAL PRN
Status: DISCONTINUED | OUTPATIENT
Start: 2022-07-08 | End: 2022-07-08 | Stop reason: ALTCHOICE

## 2022-07-08 RX ORDER — SODIUM CHLORIDE, SODIUM LACTATE, POTASSIUM CHLORIDE, CALCIUM CHLORIDE 600; 310; 30; 20 MG/100ML; MG/100ML; MG/100ML; MG/100ML
INJECTION, SOLUTION INTRAVENOUS CONTINUOUS
Status: DISCONTINUED | OUTPATIENT
Start: 2022-07-08 | End: 2022-07-08 | Stop reason: HOSPADM

## 2022-07-08 RX ORDER — SODIUM CHLORIDE 9 MG/ML
25 INJECTION, SOLUTION INTRAVENOUS PRN
Status: DISCONTINUED | OUTPATIENT
Start: 2022-07-08 | End: 2022-07-08 | Stop reason: HOSPADM

## 2022-07-08 RX ORDER — CEFAZOLIN SODIUM 2 G/50ML
SOLUTION INTRAVENOUS PRN
Status: DISCONTINUED | OUTPATIENT
Start: 2022-07-08 | End: 2022-07-08 | Stop reason: SDUPTHER

## 2022-07-08 RX ADMIN — CEFAZOLIN SODIUM 2000 MG: 2 SOLUTION INTRAVENOUS at 09:19

## 2022-07-08 RX ADMIN — MIDAZOLAM HYDROCHLORIDE 1 MG: 1 INJECTION, SOLUTION INTRAMUSCULAR; INTRAVENOUS at 09:02

## 2022-07-08 RX ADMIN — LIDOCAINE HYDROCHLORIDE 30 MG: 10 INJECTION, SOLUTION EPIDURAL; INFILTRATION; INTRACAUDAL; PERINEURAL at 09:10

## 2022-07-08 RX ADMIN — PROPOFOL 50 MCG/KG/MIN: 10 INJECTION, EMULSION INTRAVENOUS at 09:10

## 2022-07-08 RX ADMIN — SODIUM CHLORIDE, POTASSIUM CHLORIDE, SODIUM LACTATE AND CALCIUM CHLORIDE: 600; 310; 30; 20 INJECTION, SOLUTION INTRAVENOUS at 07:46

## 2022-07-08 RX ADMIN — FENTANYL CITRATE 50 MCG: 50 INJECTION, SOLUTION INTRAMUSCULAR; INTRAVENOUS at 09:02

## 2022-07-08 ASSESSMENT — PAIN - FUNCTIONAL ASSESSMENT: PAIN_FUNCTIONAL_ASSESSMENT: 0-10

## 2022-07-08 NOTE — H&P
[address] [facility]  [facility] [id number]       History and Physical     No chief complaint on file. HPI:   Thierno Corbin is a 71 y.o. male who presents with a lesion on the left face. The lesion has been present for several months. It has increases in size. Patient states nothing has improved it. Medications:     Current Facility-Administered Medications   Medication Dose Route Frequency Provider Last Rate Last Admin    lidocaine PF 1 % injection 1 mL  1 mL IntraDERmal Once PRN Carlyle Mari MD        lactated ringers infusion   IntraVENous Continuous Carlyle Mari  mL/hr at 07/08/22 0854 NoRateChange at 07/08/22 0854    sodium chloride flush 0.9 % injection 5-40 mL  5-40 mL IntraVENous 2 times per day Carlyle Mari MD        sodium chloride flush 0.9 % injection 5-40 mL  5-40 mL IntraVENous PRN Carlyle Mari MD        0.9 % sodium chloride infusion   IntraVENous PRN Carlyle Mari MD        bacitracin 500 UNIT/GM ointment             balanced salts plus (BSS) ophthalmic solution             lidocaine-EPINEPHrine 1 %-1:[id number] injection             ceFAZolin (ANCEF) IVPB               Allergies:   No Known Allergies  Review of Systems:   Constitutional: Negative for fever, chills, fatigue and unexpected weight change. HENT: Negative for hearing loss, sore throat and facial swelling. Eyes: Negative for pain and discharge. Respiratory: Patient with history of COPD  Cardiovascular: Patient with a history of coronary artery disease, hyperlipidemia, MI. Gastrointestinal: Patient with history of colon polyps  Skin: Negative for pallor and rash. Neurological: Negative for seizures, syncope and numbness. Hematological: Patient with history of leukemia  Psychiatric/Behavioral: Negative for behavioral problems. The patient is not nervous/anxious. Musculoskeletal: Patient with a history of arthritis  Past Medical History:   Diagnosis Date    Arthritis     Spine, left wrist    CAD (coronary artery disease)     heart attack x 2 with stent    [facility]ocytic [facility]) [facility]     Colon polyps     COPD (chronic obstructive pulmonary disease) (HCC)     History of heart artery stent     x 1 Placed in  after MI    Hyperlipidemia     Hypertension     Lymphoma ([facility]     Right side of neck, no chemo or radiation    MI (myocardial infarction) ([facility]     approx.  per pt.  Wears dentures     Wears eyeglasses      Past Surgical History:   Procedure Laterality Date    ARTHROTOMY      right foot big toe then removed    BACK SURGERY      CAROTID ENDARTERECTOMY Left 2014    CAROTID ENDARTERECTOMY Left 10/26/2020    with Lymph node biopsy fro left neck    CAROTID ENDARTERECTOMY Left 10/26/2020    REDO LEFT CAROTID ENDARTERECTOMY WITH LEFT CERVICAL LYMPH NODE BIOPSY performed by Salma Greer MD at [id number] Right 3/10/2021    RIGHT CAROTID ENDARTERECTOMY performed by Salma Greer MD at [address] [place] [address] per pt.  CORONARY ANGIOPLASTY WITH STENT PLACEMENT      stent x1    EYE SURGERY Bilateral     Foreign body removal    FINGER AMPUTATION Left     ring finger    HYDROCELE EXCISION Left     LUMBAR DISC SURGERY      L5-S1    LYMPH NODE DISSECTION      right side of neck    RECTAL SURGERY      fissure    TOE SURGERY Left      Social History     Socioeconomic History    Marital status:       Spouse name: Not on file    Number of children: Not on file    Years of education: Not on file    Highest education level: Not on file   Occupational History    Occupation: [employer]    Tobacco Use    Smoking status: Former Smoker     Packs/day: 0.50     Years: 24.00     Pack years: 12.00     Types: Cigarettes     Quit date: 3/12/2020     Years since quittin.3    Smokeless tobacco: Never Used   Vaping Use    Vaping Use: Never used   Substance and Sexual Activity    Alcohol use: Yes     Alcohol/week: 4.0 standard drinks     Types: 4 Cans of beer per week     Comment: 5 x a week    Drug use: No    Sexual activity: Not on file   Other Topics Concern    Not on file   Social History Narrative    Lives at home alone. Has two children, a son who lives out of town and daughter who lives in the area. Social Determinants of Health     Financial Resource Strain: Low Risk     Difficulty of Paying Living Expenses: Not hard at all   Food Insecurity: No Food Insecurity    Worried About Running Out of Food in the Last Year: Never true    Taylor of Food in the Last Year: Never true   Transportation Needs:     Lack of Transportation (Medical): Not on file    Lack of Transportation (Non-Medical):  Not on file   Physical Activity:     Days of Exercise per Week: Not on file    Minutes of Exercise per Session: Not on file   Stress:     Feeling of Stress : Not on file   Social Connections:     Frequency of Communication with Friends and Family: Not on file    Frequency of Social Gatherings with Friends and Family: Not on file    Attends Anabaptism Services: Not on file    Active Member of [address] [place] [address] [employer] or Organizations: Not on file    Attends Club or Organization Meetings: Not on file    Marital Status: Not on file   Intimate Partner Violence:     Fear of Current or Ex-Partner: Not on file    Emotionally Abused: Not on file    Physically Abused: Not on file    Sexually Abused: Not on file   Housing Stability:     Unable to Pay for Housing in the Last Year: Not on file    Number of [place] in the Last Year: Not on file    Unstable Housing in the Last Year: Not on file     Family History   Problem Relation Age of Onset    Stroke Father         two strokes  at 69    Other Mother          at 80 from unknown causes    Other Brother         alcoholism     Physical Exam:   BP (!) 160/73   Pulse (!) 49   Resp 17   Ht 5' 10\" (1.778 m)   Wt 199 lb (90.3 kg)   SpO2 95%   BMI 28.55 kg/m²    Body mass index is 28.55 kg/m². Physical Exam   Nursing note and vitals reviewed. Constitutional: Oriented to person, place, and time. Appears well-developed and well-nourished. No distress. Head: Normocephalic and atraumatic. Eyes: Conjunctivae and EOM are normal.   Pulmonary/Chest: Effort normal. No respiratory distress. Neurological: Alert and oriented to person, place, and time. Skin:         It may be either consistent with basal cell versus squamous cell in situ it measures 1.5 x 1.5 cm  Psychiatric: Normal mood and affect. Behavior is normal    Imaging:   @[address]@        Impression/Plan:      Diagnosis Orders   1. Neoplasm of uncertain behavior of skin       Patient Active Problem List   Diagnosis    Essential hypertension    Carotid artery stenosis    GERD (gastroesophageal reflux disease)    Coronary artery disease involving native coronary artery of native heart without angina pectoris    Mediastinal lymphadenopathy    Hyperglycemia    Hyperlipidemia    Recurrent carotid stenosis, left    Carotid stenosis, right    Alcohol use    CLL (chronic lymphocytic leukemia) (HCC)    Irregular heart beat       Plan:  Patient with a lesion on the left face. 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 Patient is on Plavix an in office biopsy could not be performed. At this time we will plan to do an excision in the OR with frozen sections. The risk of the procedure was discussed with patient detail. All questions were answered. We also discussed possible flap reconstruction and possible skin graft. We will also need to obtain clearance to determine if he can stop the Plavix or not. The following information was discussed with the patient, but this is not an all-inclusive-other issue were also discussed with patient.   GENERAL INFORMATION  The surgical removal of skin lesions and tumors is frequently performed by plastic surgeons. Certain skin lesions and skin tumors will not disappear spontaneously, surgical removal is a treatment option. There are many different techniques for removing skin lesions and skin tumors. ALTERNATIVE TREATMENTS  Alternative forms of management consist of not treating the skin lesion/skin tumor condition. Removal of skin lesions and some superficial skin tumors may be accomplished by other treatments including the use of liquid nitrogen (freezing), lasers, topical medications, and electric cautery. Risks and potential complications are associated with alternative forms of treatment. Deeper tumors cannot be treated by this. RISKS OF SKIN LESION/SKIN TUMOR SURGERY    I have explained to the patient that every surgical procedure involves a certain amount of risk and it is important that an understanding of these risks and the possible complications associated with them is understood. In addition, every procedure has limitations. An individual's choice to undergo a surgical procedure is based on the comparison of the risk to potential benefit. Although some of patients do not experience these complications. Bleeding- It is possible, to experience a bleeding episode during or after surgery. Intraoperative blood transfusions may be required. Should post-operative bleeding occur, it may require an emergency treatment to drain the accumulated blood or blood transfusion. Do not take any aspirin or anti-inflammatory medications for ten days before or after surgery, as this may increase the risk of bleeding. Non-prescription herbs and dietary supplements can increase the risk of surgical bleeding. Hematoma can occur at any time following injury. If blood transfusions are necessary to treat blood loss, there is the risk of blood-related infections such as hepatitis and HIV (AIDS).   Heparin medications that are used to prevent blood clots in veins can produce bleeding and decreased blood platelets. Please check with the physician who prescribed that blood thinners such as aspirin Coumadin etc. Prior to stopping them. Infection- Infection can occur after surgery. Should an infection occur, additional treatment including antibiotics, hospitalization, or additional surgery may be necessary. Scarring- All surgery leaves scars, some more visible than others. Although wound healing after a surgical procedure is expected, abnormal scars may occur within the skin and deeper tissues. Scars may be unattractive and of different color than the surrounding skin tone. Scar appearance may also vary within the same scar. There is the possibility of visible marks from sutures used to close the wound after the removal of skin lesions and tumors. There is the possibility that scars may limit motion and function. In some cases, scars may require surgical revision or treatment. Obesity: If you're BMI is greater than 30 you may have a higher chance of complications. This may include but not limited to wound healing and infections. Also, if you have other medical problems such as diabetes and hypertension it may affect your healing as well as your surgical results in bleeding. Damage to Deeper Structures- There is the potential for injury to deeper structures including nerves, blood vessels, muscles, and lungs (pneumothorax) during any surgical procedure. The potential for this to occur varies according to where on the body surgery is being performed. Injury to deeper structures may be temporary or permanent. Cancer- In some situations, a skin lesion or tumor that appears to be benign may be determined to be cancerous after laboratory analysis. Additional treatments or surgery may be necessary. Recurrence- In some situation, skin lesions and tumors can recur after surgical excision. Additional treatment or secondary surgery may be necessary.   Skin Discoloration / Swelling- Some bruising and swelling normally occurs following surgery. The skin in or near the surgical site can appear either lighter or darker than surrounding skin. Although uncommon, swelling and skin discoloration may persist for long periods of time and, in rare situations, may be permanent. Skin Sensitivity- Itching, tenderness, or exaggerated responses to hot or cold temperatures may occur after surgery. Usually this resolves during healing, but in some situations, it may be chronic, permanent. Pain- You will experience pain after your surgery. Pain of varying intensity and duration may occur and persist after surgery. Chronic pain may occur from nerves becoming trapped in scar tissue. Sutures- Some surgical techniques use deep sutures. You may notice these sutures after your surgery. Sutures may spontaneously poke through the skin, become visible or produce irritation that requires removal.  Delayed Healing- Wound disruption or delayed wound healing is possible. Some areas of the skin may not heal normally and may take a long time to heal.  Some areas of skin may die, requiring frequent dressing changes or further surgery to remove the non-healed tissue. Smokers have a greater risk of skin loss and wound healing complications. Skin Contour Irregularities- Contour irregularities and depressions may occur after surgery. Visible and palpable wrinkling of skin can occur. Residual skin irregularities at the ends of the incisions or dog ears are always a possibility and may require additional surgery. This may improve with time, or it can be surgically corrected. Allergic Reactions- In some cases, local allergies to tape, suture materials and glues, blood products, topical preparations or injected agents have been reported. Serious systemic reactions including shock (anaphylaxis) may occur to drugs used during surgery and prescription medications.   Allergic reactions may require additional treatment. Surgical Anesthesia- Both local and general anesthesia involve risk. There is the possibility of complications, injury, and even death from all forms of surgical anesthesia or sedation. Change in Skin Sensation- It is common to experience diminished (or loss) of skin sensation in areas that have had surgery. Diminished (or complete loss of skin sensation) may not totally resolve. Shock- In rare circumstances, your surgical procedure can cause severe trauma, particularly when multiple or extensive procedures are performed. Although serious complications are infrequent, infections or excessive fluid loss can lead to severe illness and even death. If surgical shock occurs, hospitalization and additional treatment would be necessary. Unsatisfactory Result- There is no guarantee or warranty expressed or implied, on the results that may be obtained. There is the possibility of a poor result from the removal of skin lesions and tumors. This would include risks such as unacceptable visible deformities, loss of function, poor healing, wound disruption, skin death and loss of sensation. You may be disappointed with the results of reconstructive surgery. It may be necessary to perform additional surgery to improve your results. Cardiac and Pulmonary Complications- Surgery, especially longer procedures, may be associated with the formation of, or increase in, blood clots in the venous system. Pulmonary complications may occur secondarily to both blood clots (pulmonary emboli), fat deposits (fat emboli) or partial collapse of the lungs after general anesthesia. Pulmonary and fat emboli can be life-threatening or fatal in some circumstances. Air travel, inactivity and other conditions may increase the incidence of blood clots traveling to the lungs causing a major blood clot that may result in death.   It is important to discuss with your physician any past history of blood clots herbal, and/or prescription medications. Be sure to check with your physician about any drug interactions that may exist with medications which you are already taking. If you have an adverse reaction, stop the drugs immediately and call your plastic surgeon for further instructions. If the reaction is severe, go immediately to the nearest emergency room. When taking the prescribed pain medications after surgery, realize that they can affect your thought process and coordination. Do not drive, do not operate complex equipment, do not make any important decisions, and do not drink any alcohol while taking these medications. Be sure to take your prescribed medication only as directed. ADDITIONAL SURGERY NECESSARY  Should complications occur, additional surgery or other treatments may be necessary. Even though risks and complications occur infrequently, the risks cited are the ones that are particularly associated with skin lesion and skin tumor removal.  Other complications and risks can occur but are even more uncommon. The practice of medicine and surgery is not an exact science. Although good results are expected, there is no guarantee or warranty expressed or implied, on the results that may be obtained. PATIENT COMPLIANCE   Follow all physician instructions carefully; this is essential for the success of your outcome. It is important that the surgical incisions are not subjected to excessive force, swelling, abrasion, or motion during the time of healing. Protective dressings and drains should not be removed unless instructed by your plastic surgeon. Successful post-operative function depends on both surgery and subsequent care. Physical activity that increases your pulse or heart rate may cause bruising, swelling, fluid accumulation and the need for return to surgery. It is wise to refrain from intimate physical activities after surgery until your physician states it is safe.   It is important that you participate in follow-up care, return for aftercare, and promote your recovery after surgery. HEALTH INSURANCE  Most health insurance companies exclude coverage for cosmetic surgical operations or any complications that might occur from surgery. Please carefully review your health insurance subscriber information pamphlet. Most insurance plans exclude coverage for secondary or revisionary surgery. Your type of surgery will most likely be covered by your insurance company, I went there is no guarantees or warrantees implied or otherwise. The cost of surgery involves several charges for the services provided. The total includes fees charged by your surgeon, the cost of surgical supplies, anesthesia, laboratory tests, and possible outpatient hospital charges, depending on where the surgery is performed. Depending on whether the cost of surgery is covered by an insurance plan, you will be responsible for necessary co-payments, deductibles, and charges not covered. The fees charged for this procedure do not include any potential future costs for additional procedures that you elect to have or require in order to revise, optimize, or complete your outcome. Additional costs may occur should complications develop from the surgery. Secondary surgery or hospital day-surgery charges involved with revision surgery will also be your responsibility. II have also explained to the patient that we may obtain photographs. These images or illustration along with my medical records created in my case may be used for obtaining insurance approval, for use in examination, may assist in education, testing, credentialing and or certifying by [facility]. These images and records may be used to communicate with referring physician. The following information was discussed with the patient, but this is not an all-inclusive-other issue were also discussed with patient.     GENERAL INFORMATION  Flap surgery is frequently performed to cover a defect. Flap taken from one area of the body to restore coverage in other area(s). Flaps are also moved to adjacent areas of the body as well. Flaps help wounds heal that otherwise would not heal adequately. Flaps are useful in situations where there are not adequate subcutaneous tissues present to provide support and blood supply for the skin graft. Flaps are generally classified as to location of transfer. Flaps are generally full thickness tend to be used for specific wound coverage applications when there is full loss of skin and tissue. Flaps are an effective means of assisting wound healing when there has been a loss of skin and tissue due to conditions that involve disease, injuries, or surgical removal of tumors. Some wounds may be too complex to heal without other more involved reconstructive techniques. In some situations, surgical procedure(s) and other treatments may be needed to prepare a wound for a flap. When the flap is moved from one area to the other day will be a defect from the donor area. This may be to loss of function, strength, sensation and other deformities. These deformities include but not limited to external visible scars, defects where the bulk of the muscle tissues no longer available in that area. ALTERNATIVE TREATMENTS  Alternative forms of care consist of not undergoing surgery. Some minor wounds may heal without surgery. In other situations, different forms of treatment such as the transfer of skin and other composite pieces of tissue may be preferable to flaps. Microsurgical tissue transfer may be necessary in situations when ordinary surgical techniques cannot provide for satisfactory tissue to cover a complex wound. Risks and potential complications are associated with alternative surgical forms of treatment.   Although some wounds can heal spontaneously, there may be increased risk of unsatisfactory result, scarring, and functional impairment. You always have the option of no reconstruction. RISKS OF FLAP SURGERY  Every surgical procedure involves a certain amount of risk and it is important that you understand these risks and the possible complications associated with them. In addition, every procedure has limitations. An individual's choice to undergo a surgical procedure is based on the comparison of the risk to potential benefit. Bleeding- It is possible, though unusual, to experience a bleeding episode during or after surgery. Intraoperative blood transfusions may be required. Should post-operative bleeding occur, it may require an emergency treatment to drain the accumulated blood or blood transfusion. Do not take any aspirin or anti-inflammatory medications for ten days before or after surgery, as this may increase the risk of bleeding. Non-prescription herbs and dietary supplements can increase the risk of surgical bleeding. Hematoma can occur at any time following injury. If blood transfusions are necessary to treat blood loss, there is the risk of blood-related infections such as hepatitis and HIV (AIDS). Heparin medications that are used to prevent blood clots in veins can produce bleeding and decreased blood platelets. Seroma- Fluid may accumulate around the tissue expander/implant or surgical sites following surgery, from trauma or vigorous exercise. Additional treatment may be necessary to drain fluid accumulation around tissue expander or implant or surgical site. This may contribute to infection, capsular contracture, or other problems. Infection- Infections can occur after any type of surgery, it may appear in the immediate post-operative period or at any time following surgery. Subacute or chronic infections may be difficult to diagnose. Should an infection occur, treatment including antibiotics. You may need additional surgery.    In extremely rare instances, life-threatening infections, including toxic shock syndrome have been noted after tissue expander/implant/nasal packing or any implantable device or packing. Individuals with an active infection in their body or weakened immune system (currently receiving or have received chemotherapy or drugs to suppress the immune system), may be at greater risk for infection. Inability to Heal- Conditions that involve disease, injuries including burns, or surgical removal of tumors/cancers can produce severe wounds. Flaps require adequate blood supply for survival.  Areas of the body where there is inadequate blood supply due to injury, disease states, or the effect of radiation therapy, may not be capable of providing adequate blood supply for flap survival.  Flaps are also vulnerable to loss in disease situations where there is a propensity for chronic swelling or vascular insufficiency disorders. Some wounds may be of the extent and severity that flaps cannot produce closure of the wound and healing. More involved reconstructive surgical procedures may be necessary. Scarring- All surgery leaves scars, some more visible than others. Although wound healing after a surgical procedure is expected, abnormal scars may occur within the skin and deeper tissues. Scars may be unattractive and of different color than the surrounding skin tone. Scar appearance may also vary within the same scar. In some cases, scars may require surgical revision or treatment. Depending on the location of the scar it may cause restriction in the movement of joints. These Scars may require resection and therapy post operatively. Obesity: If you're BMI is greater than 30 you may have a higher chance of complications. This may include but not limited to wound healing and infections.   Also, if you have other medical problems such as diabetes and hypertension it may affect your healing as well as your surgical results in permanent. Once a flap is taken that muscle, skin, bone and tendon is permanently removed from that location, and there will be loss of function associated with the structures taken. Skin Sensitivity- Itching or other skin sensation changes can occur at the donor location and the recipient location. Flap disruption may occur from scratching. Additionally, these areas may have exaggerated responses to hot or cold temperatures. Usually this resolves during healing, but it may be chronic. Inability to Restore Function- In some situations, flaps cannot restore the normal function of intact skin or undamaged deeper structures. There can be a loss of function. Additional treatment and surgery may be necessary. Surgical Anesthesia- Both local and general anesthesia involves risk. There is the possibility of complications, injury, and even death from all forms of surgical anesthesia or sedation. Damage to Deeper Structures- There is the potential for injury to deeper structures including nerves, blood vessels, muscles, and lungs (pneumothorax) during any surgical procedure. The potential for this to occur varies according to where on the body surgery is being performed. Injury to deeper structures may be temporary or permanent. Allergic Reactions- In some cases, local allergies to tape, suture materials and glues, blood products, topical preparations or injected agents have been reported. Serious systemic reactions including shock (anaphylaxis) may occur to drugs used during surgery and prescription medications. Allergic reactions may require additional treatment. Skin Cancer - Skin cancer can sometimes occur in flaps. Pain- You will experience pain after your surgery. Pain of varying intensity and duration may occur and persist after surgery. Chronic pain may occur from nerves becoming trapped in scar tissue or from other causes after flap surgery.     Thrombosed Veins- Thrombosed veins, which resemble cords, occasionally develop in the area of the surgery, and resolve without medical or surgical treatment. Unusual Activities and Occupations- Activities and occupations that have the potential for trauma to the area could potentially break or damage any device or cause bleeding/seroma. Buried Surgical Staples / Sutures- Sutures and staples used to hold flap in place could potentially become buried under the skin during healing. Sutures may spontaneously poke through the skin, become visible or produce irritation that requires removal.  Additional surgery may be necessary to remove buried staples and sutures. Lacks flap Durability- Some flaps do not have the normal padding and durability of normal, undamaged skin. Flaps lack the normal ability of skin to resist ordinary abrasions and injuries. Shock- In some circumstances, your surgical procedure can cause severe trauma, particularly when multiple or extensive procedures are performed. Although serious complications are infrequent, infections or excessive fluid loss can lead to severe illness and even death. If surgical shock occurs, hospitalization and additional treatment would be necessary. Unsatisfactory Result- Although good results are expected, there is no guarantee or warranty expressed or implied, on the results that may be obtained. There is the possibility of a poor result from flap surgery. This would include risks such as unacceptable visible deformities, loss of function, poor healing, wound disruption, skin and soft tissue loss, chronic pain and loss of sensation. There may be unacceptable cosmetic deformities from flaps placed on visible portions of the body or in the flap donor areas. Abnormal color of flap may occur depending on its origin. It may be necessary to perform additional surgery to improve your results.     Cardiac and Pulmonary Complications- Surgery, especially longer procedures, may be associated with the formation of, or increase in, blood clots in the venous system. Pulmonary complications may occur secondarily to both blood clots (pulmonary emboli), fat deposits (fat emboli) or partial collapse of the lungs after general anesthesia. Pulmonary and fat emboli can be life-threatening or fatal in some circumstances. Air travel, inactivity and other conditions may increase the incidence of blood clots traveling to the lungs causing a major blood clot that may result in death. It is important to discuss with your physician any past history of blood clots or swollen legs that may contribute to this condition. Cardiac complications are a risk with any surgery and anesthesia, even in patients without symptoms. Should any of these complications occur, you may require hospitalization and additional treatment. Mental Health Disorders and Surgery- It is important that all patients seeking to undergo surgery have realistic expectations that focus on improvement rather than perfection. Complications or less than satisfactory results are sometimes unavoidable, may require additional surgery and often are stressful. Please openly discuss with your surgeon, prior to surgery, any history that you may have of significant emotional depression or mental health disorders. Although many individuals may benefit psychologically from the results of surgery, effects on mental health cannot be accurately predicted. Smoking, Second-Hand Smoke Exposure, Nicotine Products (Patch, Gum, Nasal Spray)-   Patients who are currently smoking, use tobacco products, or nicotine products (patch, gum, or nasal spray) are at a greater risk for significant surgical complications of skin dying, increase risk of partial or complete flap loss, delayed healing, and additional scarring. Individuals exposed to second-hand smoke are also at potential risk for similar complications attributable to nicotine exposure.   Additionally, smoking may have a significant negative effect on anesthesia and recovery from anesthesia, with coughing and possibly increased bleeding. Individuals who are not exposed to tobacco smoke or nicotine-containing products have a significantly lower risk of this type of complication  These images or illustration along with my medical records created in my case may be used for obtaining insurance approval, for use in examination, may assist in education, testing, credentialing and or certifying by [facility]. These images and records may be used to communicate with referring physician. Medications- There are many adverse reactions that occur as the result of taking over the counter, herbal, and/or prescription medications. Be sure to check with your physician about any drug interactions that may exist with medications which you are already taking. If you have an adverse reaction, stop the drugs immediately and call for further instructions. If the reaction is severe, go immediately to the nearest emergency room. When taking the prescribed pain medications after surgery, realize that they can affect your thought process and coordination. Do not drive, do not operate complex equipment, do not make any important decisions, and do not drink any alcohol while taking these medications. Be sure to take your prescribed medication only as directed. PATIENT COMPLIANCE   Follow all physician instructions carefully; this is essential for the success of your outcome. It is important that the flap is not subjected to excessive force, swelling, abrasion, or motion during the time of healing or flap loss may occur. flap donor locations are similarly vulnerable to injury during the healing process. Protective dressings and drains should not be removed unless instructed by your plastic surgeon. Successful post-operative function depends on both surgery and subsequent rehabilitation.   You may be advised to wear compressive garments, casts to control both swelling and protect the flap following surgery. Personal and vocational activity needs to be restricted. Physical activity that increases your pulse or heart rate may cause bruising, swelling, fluid accumulation and the need for return to surgery. It is wise to refrain from intimate physical activities after surgery until your physician states it is safe. It is important that you participate in follow-up care, return for aftercare, and promote your recovery after surgery. ADDITIONAL SURGERY NECESSARY  Should complications occur, additional surgery or other treatments may be necessary. Even though risks and complications occur infrequently, the risks cited are particularly associated with flap surgery. Other complications and risks can occur. The practice of medicine and surgery is not an exact science. Although good results are expected, there is no guarantee or warranty expressed or implied on the results that may be obtained. HEALTH INSURANCE  Most health insurance companies exclude coverage for cosmetic surgical operations or any complications that might occur from surgery. Please carefully review your health insurance subscriber information pamphlet. Most insurance plans exclude coverage for secondary or reversionary surgery. FINANCIAL RESPONSIBILITIES  The cost of surgery involves several charges for the services provided. The total includes fees charged by the surgeon, the cost of surgical supplies, anesthesia, laboratory tests, and possible outpatient hospital charge. Depending on whether the cost of surgery is covered by an insurance plan, you will be responsible for necessary co-payments, deductibles, and charges not covered. The fees charged for this procedure do not include any potential future costs for additional procedures that you elect to have or require in order to revise, optimize, or complete your outcome.   Additional costs may occur should complications develop from the surgery. Secondary surgery or hospital day-surgery charges involved with revision surgery will also be your responsibility. The following information was discussed with the patient, but this is not an all-inclusive-other issues were also discussed with patient. GENERAL INFORMATION  Skin graft surgery is frequently performed by plastic surgeons by using skin taken from one area of the body to restore skin coverage in other area(s). Skin grafts help wounds heal that otherwise would not heal adequately. Skin grafts are useful in situations where there is adequate subcutaneous tissues present to provide support and blood supply for the skin graft. Skin grafts are generally classified as to the thickness of the skin that is being grafted from one part of the body to some other region. A split-thickness skin graft does not comprise the entire thickness of skin. The donor area where the split-thickness graft is taken can heal on its own. Large areas of the body can be used for split-thickness skin grafts. The full thickness skin graft is different as it involves the full thickness of skin and deeper tissues. Full-thickness grafts tend to be used for specific wound coverage applications when thicker skin is needed. The donor area for the full thickness graft is limited in size as full-thickness skin graft donor sites cannot be used more than one time. Skin grafts are an effective means of assisting wound healing when there has been a loss of skin due to conditions that involve disease, injuries including burns, or surgical removal of tumors. Some wounds may be too complex to heal without other more involved reconstructive techniques. In some situations, surgical procedure(s) and other treatments (dressing changes and hydrotherapy) may be needed to prepare a wound for a skin graft.   ALTERNATIVE TREATMENTS  Alternative forms of care consist of not undergoing surgery. Some minor wounds may heal without surgery. In other situations, different forms of treatment such as the transfer of skin and other composite pieces of tissue may be preferable to skin grafts. Microsurgical tissue transfer may be necessary in situations when ordinary surgical techniques cannot provide for satisfactory tissue to cover a complex wound. Risks and potential complications are associated with alternative surgical forms of treatment. Although wounds can heal spontaneously, there may be increased risk of unsatisfactory result, scarring, and functional impairment. RISKS OF SKIN GRAFT SURGERY  Every surgical procedure involves a certain amount of risk and it is important that you understand these risks and the possible complications associated with them. In addition, every procedure has limitations. An individual's choice to undergo a surgical procedure is based on the comparison of the risk to potential benefit. Although the majority of patients do not experience these complications, you should discuss each of them with your plastic surgeon to make sure you understand all possible consequences of skin graft surgery. Bleeding- It is possible, though unusual, to experience a bleeding episode during or after surgery. Intraoperative blood transfusions may be required. Should post-operative bleeding occur, it may require an emergency treatment to drain the accumulated blood or blood transfusion. Do not take any aspirin or anti-inflammatory medications for ten days before or after surgery, as this may increase the risk of bleeding. Non-prescription herbs and dietary supplements can increase the risk of surgical bleeding. Hematoma can occur at any time following injury. If blood transfusions are necessary to treat blood loss, there is the risk of blood-related infections such as hepatitis and HIV (AIDS).   Heparin medications that are used to prevent blood clots in veins can produce bleeding and decreased blood platelets. Infection- Infections after skin graft surgery may occur. There is the possibility of skin graft failure or scarring from an infection. Should an infection occur, additional treatment including antibiotics, hospitalization, or additional surgery may be necessary. Inability to Heal- Conditions that involve disease, injuries including burns, or surgical removal of tumors can produce severe wounds. Skin grafts require adequate blood supply for survival.  Areas of the body where there is inadequate blood supply due to injury, disease states, or the effect of radiation therapy, may not be capable of providing adequate blood supply for skin graft survival.  Skin grafts are also vulnerable to loss in disease situations where there is a propensity for chronic swelling or vascular insufficiency disorders. Some wounds may be of the extent and severity that skin grafts cannot produce closure of the wound and healing. More involved reconstructive surgical procedures may be necessary. Scarring- All surgery leaves scars, some more visible than others. Although good wound healing after a surgical procedure is expected, abnormal scars may occur within the skin and deeper tissues. Scars may be unattractive and of different color than the surrounding skin tone. Scar appearance may also vary within the same scar. Special compressive garments may be needed to help control scarring. In some cases scars may require surgical revision or treatment. Skin Sensation- Diminished (or loss) of skin sensation in the donor location for the graft as well as the location where the graft is placed may occur and not totally resolve after skin graft surgery. Skin grafts generally do not regain normal skin sensation. Injuries may occur secondary to this lack of sensation if the skin graft is subjected to excessive heat, cold, or physical force.   Skin grafts placed in areas of decreased sensation are prone to injury and loss. Care must be given to avoid injury to these areas or complications may occur. Skin Contour Irregularities- Contour irregularities and depressions may occur after skin graft surgery. Visible and palpable wrinkling of skin can occur. If a skin graft has been processed in a graft meshing device, it may heal with a pattern. Residual skin irregularities at the ends of the incisions or dog ears are always a possibility and may require additional surgery. This may improve with time, or it can be surgically corrected. Delayed Healing- Wound disruption or delayed wound healing is possible. Scarring and inadequate healing may occur in the location where the skin graft is taken for transfer to other parts of the body. Healing of the donor area may take unacceptably long periods of time. The donor area once healed may be prone to abrasions. The skin graft may heal abnormally or slowly. Some areas of skin may die, requiring frequent dressing changes or further surgery to remove the non-healed tissue. Smokers have a greater risk of skin loss and wound healing complications. Skin Discoloration / Swelling- Some bruising and swelling normally occurs following surgery. Skin grafts and the skin graft donor location can undergo changes in color. It is possible to have these areas be either darker or lighter than surrounding skin. These changes can be permanent. Skin Sensitivity- Itching is a common complaint in both the skin graft donor location and the recipient location. Graft abrasion may occur from scratching. Additionally, these areas may have exaggerated responses to hot or cold temperatures. Usually this resolves during healing, but it may be chronic. Inability to Restore Function- In some situations, skin grafts cannot restore the normal function of intact skin or undamaged deeper structures.   Although it may be possible to produce healing with a skin graft, there can be a loss of function. Additional treatment and surgery may be necessary. Surgical Anesthesia- Both local and general anesthesia involve risk. There is the possibility of complications, injury, and even death from all forms of surgical anesthesia or sedation. Damage to Deeper Structures- There is the potential for injury to deeper structures including nerves, blood vessels, muscles, and lungs (pneumothorax) during any surgical procedure. The potential for this to occur varies according to where on the body surgery is being performed. Injury to deeper structures may be temporary or permanent. Allergic Reactions- In rare cases, local allergies to tape, suture materials and glues, blood products, topical preparations or injected agents have been reported. Serious systemic reactions including shock (anaphylaxis) may occur to drugs used during surgery and prescription medications. Allergic reactions may require additional treatment. Skin Cancer in Skin Grafts- Skin cancer can rarely occur in skin grafts. Pain- You will experience pain after your surgery. Pain of varying intensity and duration may occur and persist after surgery. Chronic pain may occur very infrequently from nerves becoming trapped in scar tissue or from other causes after skin graft surgery. Buried Surgical Staples / Sutures- Sutures and staples used to hold skin grafts in place can potentially become buried under the skin during healing. Sutures may spontaneously poke through the skin, become visible or produce irritation that requires removal.  Additional surgery may be necessary to remove buried staples and sutures. Lacks of Graft Durability- Skin grafts do not have the normal padding and durability of normal, undamaged skin. Skin grafts lack the normal ability of skin to resist ordinary abrasions and injuries.   Shock- In rare circumstances, your surgical procedure can cause severe trauma, particularly when multiple or extensive procedures are performed. Although serious complications are infrequent, infections or excessive fluid loss can lead to severe illness and even death. If surgical shock occurs, hospitalization and additional treatment would be necessary. Unsatisfactory Result- Although good results are expected, there is no guarantee or warranty expressed or implied, on the results that may be obtained. There is the possibility of a poor result from skin graft surgery. This would include risks such as unacceptable visible deformities, loss of function, poor healing, wound disruption, skin and soft tissue loss, chronic pain and loss of sensation. There may be unacceptable cosmetic deformities from skin grafts placed on visible portions of the body or in the skin graft donor areas. Abnormal color of skin graft and graft origin location may occur. It may be necessary to perform additional surgery to improve your results. Cardiac and Pulmonary Complications- Surgery, especially longer procedures, may be associated with the formation of, or increase in, blood clots in the venous system. Pulmonary complications may occur secondarily to both blood clots (pulmonary emboli), fat deposits (fat emboli) or partial collapse of the lungs after general anesthesia. Pulmonary and fat emboli can be life-threatening or fatal in some circumstances. Air travel, inactivity and other conditions may increase the incidence of blood clots traveling to the lungs causing a major blood clot that may result in death. It is important to discuss with your physician any past history of blood clots or swollen legs that may contribute to this condition. Cardiac complications are a risk with any surgery and anesthesia, even in patients without symptoms. If you experience shortness of breath, chest pains, or unusual heart beats, seek medical attention immediately. Should any of these complications occur, you may require hospitalization and additional treatment. Obesity: If you're BMI is greater than 30 you may have a higher chance of complications. This may include but not limited to wound healing and infections. Also, if you have other medical problems such as diabetes and hypertension it may affect your healing as well as your surgical results in bleeding. .   Mental Health Disorders and Surgery- It is important that all patients seeking to undergo surgery have realistic expectations that focus on improvement rather than perfection. Complications or less than satisfactory results are sometimes unavoidable, may require additional surgery and often are stressful. Please openly discuss with your surgeon, prior to surgery, any history that you may have of significant emotional depression or mental health disorders. Although many individuals may benefit psychologically from the results of surgery, effects on mental health cannot be accurately predicted. Smoking, Second-Hand Smoke Exposure, Nicotine Products (Patch, Gum, Nasal Spray)-   Patients who are currently smoking, use tobacco products, or nicotine products (patch, gum, or nasal spray) are at a greater risk for significant surgical complications of skin dying, delayed healing, and additional scarring. Individuals exposed to second-hand smoke are also at potential risk for similar complications attributable to nicotine exposure. Additionally, smoking may have a significant negative effect on anesthesia and recovery from anesthesia, with coughing and possibly increased bleeding. Smokers also how significantly higher risk of pulmonary complications after surgery including but not limited to pneumonia. It is important to refrain from smoking at least 6 weeks before surgery and until your physician states it is safe to return, if desired. Medications- There are many adverse reactions that occur as the result of taking over the counter, herbal, and/or prescription medications.   Be sure to check with your physician about any drug interactions that may exist with medications which you are already taking. If you have an adverse reaction, stop the drugs immediately and call your plastic surgeon for further instructions. If the reaction is severe, go immediately to the nearest emergency room. When taking the prescribed pain medications after surgery, realize that they can affect your thought process and coordination. Do not drive, do not operate complex equipment, do not make any important decisions, and do not drink any alcohol while taking these medications. Be sure to take your prescribed medication only as directed. PATIENT COMPLIANCE   Follow all physician instructions carefully; this is essential for the success of your outcome. It is important that the skin graft is not subjected to excessive force, swelling, abrasion, or motion during the time of healing or graft loss may occur. Skin graft donor locations are similarly vulnerable to injury during the healing process. Protective dressings and drains should not be removed unless instructed by your plastic surgeon. Successful post-operative function depends on both surgery and subsequent rehabilitation. You may be advised to wear compressive garments to control both swelling and scarring following skin graft surgery. Personal and vocational activity needs to be restricted. Physical activity that increases your pulse or heart rate may cause bruising, swelling, fluid accumulation and the need for return to surgery. It is wise to refrain from intimate physical activities after surgery until your physician states it is safe. It is important that you participate in follow-up care, return for aftercare, and promote your recovery after surgery. ADDITIONAL SURGERY NECESSARY  Should complications occur, additional surgery or other treatments may be necessary.   Even though risks and complications occur infrequently, the risks cited are particularly associated with skin graft surgery. Other complications and risks can occur but are even more uncommon. The practice of medicine and surgery is not an exact science. Although good results are expected, there is no guarantee or warranty expressed or implied on the results that may be obtained. HEALTH INSURANCE  Most health insurance tend to cover reconstructive procedures, however there is no guarantees or warrantees that the procedure will be covered by your insurance company. However, coverage for cosmetic surgical operations or any complications that might occur from surgery. Please carefully review your health insurance subscriber information pamphlet. Most insurance plans exclude coverage for secondary or revisionary surgery. FINANCIAL RESPONSIBILITIES  The cost of surgery involves several charges for the services provided. The total includes fees charged by the surgeon, the cost of surgical supplies, anesthesia, laboratory tests, and hospital charges. Depending on whether the cost of surgery is covered by an insurance plan, you will be responsible for necessary co-payments, deductibles, and charges not covered. The fees charged for this procedure do not include any potential future costs for additional procedures that you elect to have or require in order to revise, optimize, or complete your outcome. Additional costs may occur should complications develop from the surgery. Secondary surgery or hospital day-surgery charges involved with revision surgery will also be your responsibility.       Electronically signed by:  Shaan Burciaga MD 7/8/2022

## 2022-07-08 NOTE — ANESTHESIA POSTPROCEDURE EVALUATION
POST- ANESTHESIA EVALUATION       Pt Name: South Bowman  MRN: [id number]  YOB: 1950  Date of evaluation: 7/8/2022  Time:  12:31 PM      BP (!) 146/51   Pulse (!) 43   Temp (!) 96.7 °F (35.9 °C) (Temporal)   Resp 11   Ht 5' 10\" (1.778 m)   Wt 199 lb (90.3 kg)   SpO2 99%   BMI 28.55 kg/m²      Consciousness Level  Awake  Cardiopulmonary Status  Stable  Pain Adequately Treated YES  Nausea / Vomiting  NO  Adequate Hydration  YES  Anesthesia Related Complications NONE      Electronically signed by Angelo Avilez MD on 7/8/2022 at 12:31 PM       Department of Anesthesiology  Postprocedure Note    Patient: South Bowman  MRN: [id number]  YOB: 1950  Date of evaluation: 7/8/2022      Procedure Summary     Date: 07/08/22 Room / Location: [address]    Anesthesia Start: 0900 Anesthesia Stop: 4034    Procedure: LEFT FACIAL LESION EXCISION WITH COMPLEX CLOSURE AND FROZEN SECTION * PATHOLOGY REQUIRED (Left ) Diagnosis:       Neoplasm of uncertain behavior of skin      (LEFT FACIAL LESION)    Surgeons: Yokasta Dowd MD Responsible Provider: Angelo Avilez MD    Anesthesia Type: MAC, general ASA Status: 3          Anesthesia Type: No value filed.     Elmira Phase I: Elmira Score: 10    Elmira Phase II: Elmira Score: 10      Anesthesia Post Evaluation

## 2022-07-08 NOTE — TELEPHONE ENCOUNTER
Please advise patient that I am recommending Dr. Lamberto King, but he may choose any provider of his choice.

## 2022-07-08 NOTE — INTERVAL H&P NOTE
Update History & Physical    The patient's History and Physical June 8, 2022 of was reviewed with the patient and I examined the patient. There was no changes. He surgical site was confirmed by the patient and me. Plan: The risks, benefits, expected outcome, and alternative to the recommended procedure have been discussed with the patient. Patient understands and wants to proceed with the procedure.      Electronically signed by Maxime Russell MD on 7/8/2022 at 8:59 AM

## 2022-07-08 NOTE — TELEPHONE ENCOUNTER
----- Message from Becca Allison sent at 7/8/2022  2:13 PM EDT -----  Subject: Appointment Request    Reason for Call: Established Patient Appointment needed: New Patient   Request Appointment    QUESTIONS    Reason for appointment request? Available appointments did not meet   patient need     Additional Information for Provider? Patient said his doctor is leaving   and he needs to establish care with another doctor. Patient request a call   back from the office to help with this matter. ---------------------------------------------------------------------------  --------------  Nataly Pagan [facility]  [phone];  Do not leave any message, patient will call back for answer  ---------------------------------------------------------------------------  --------------  SCRIPT ANSWERS  LEANNE Screen: Gill Lara

## 2022-07-08 NOTE — OP NOTE
Assessment/Plan:  NGE                                                                                                               Rectocele '21      Obesity - BMI 47  Co- Testing q 5 yrs  - now                                                                             RTO 1 yr  SBA monthly  3 D Mammography   Colonoscopy  46 - never, referral given  Exercise 3/wk                  Calcium 1,000 mg/d with Vit D     Depression Screen: Neg       Diagnoses and all orders for this visit:    Encounter for annual routine gynecological examination  -     Liquid-based pap, screening    Screening for cervical cancer  -     Liquid-based pap, screening    Encounter for screening mammogram for breast cancer  -     Cancel: Mammo screening bilateral w 3d & cad; Future  -     Mammo screening bilateral w 3d & cad; Future    Rectocele    BMI 45 0-49 9, adult ([facility] [address] )    Screening for colon cancer  -     Ambulatory referral to Gastroenterology; Future              Subjective:        Patient ID: Chetna Hastings is a 63 y o  female  Marrero Francisca is here for her annual visit  She was seen in July for a yeast infection after an emergency room visit  She is without complaints  She is menopausal denies any bleeding  She is not sexually active  She is still trying to save money to see the vascular surgeon regarding varicose veins  A language phone line was used to interpret  She has received the COVID vaccination  The following portions of the patient's history were reviewed and updated as appropriate: She  has a past medical history of Breast lump, Essential hypertension (2020), Hemorrhoid, History of varicose veins, Irritable bowel syndrome, and  (spontaneous vaginal delivery)    Patient Active Problem List    Diagnosis Date Noted    Encounter for annual routine gynecological examination 2021    Encounter for screening mammogram Operative Note      Patient: Tex Knox  YOB: 1950  MRN: [id number]    Date of Procedure: 7/8/2022    Pre-Op Diagnosis: LEFT FACIAL LESION    Post-Op Diagnosis: Invasive squamous cell carcinoma       Procedure(s):  Excision of LEFT FACIAL LESION MEASURING 3.5 X 3.8 CM WITH FASCIOCUTANEOUS FLAP RECONSTRUCTION TO THE DEFECT SIZE WHICH MEASURES 4.5 X 4 CM   Surgeon(s):  Lázaro Keys MD    Assistant:   * No surgical staff found *    Anesthesia: Monitor Anesthesia Care    Estimated Blood Loss (mL): Minimal    Complications: None    Specimens:   ID Type Source Tests Collected by Time Destination   A : LEFT FACE LESION *SUTURE AT 12 O'CLOCK* Tissue Tissue SURGICAL PATHOLOGY Lázaro Keys MD 7/8/2022 0927    B : ADDITIONAL DEEP MARGINS *SUTURE AT NEW DEEP MARGIN* Tissue Tissue SURGICAL PATHOLOGY Lázaro Keys MD 7/8/2022 1007          INDICATION FOR PROCEDURE:  The patient is 71 y.o. male . All the risks, benefits, and alternative treatments were explained to the patient and an informed consent was obtained. DESCRIPTION OF PROCEDURE:  The patient was marked in the holding area. The patient was brought into the room, was placed on the table in the supine position. All areas were prepped and draped in usual customary sterile manner. A timeout was performed. Everybody in the room was in agreement with the timeout. Then local anesthetic was injected. All areas were tested. After it was determined there was adequate local anesthesia. The attention was turned to left face lesion. Then an incision was made over the area. This was done using a #15 blade. The lesion was completely excised to its visible margins. Suture was placed at the 12 o'clock position. The deep margin was positive for involvement of invasive squamous cell carcinoma. This was over the muscle. Then the muscle layer was excised. At the new suture was placed and the new deep margin. The margins were negative for malignancy. Then the oxygen was turned off for greater than 1 minute. Hemostasis was achieved. Due to the size of the defect a primary or complex closure could not be performed. A fasciocutaneous flap was developed in the following manner. Gloves were changed. All new instruments were used. Additional local was injected. All areas were tested. Then an incision was made from the end of the defect on the left side to the preauricular area. Dissection was made through the skin subcutaneous tissue and the fascia. A fasciocutaneous flap was elevated. The fasciocutaneous flap was rotated. The fasciocutaneous flap was inset. Then undermining was performed medially, laterally, superiorly, and inferiorly to obtain tension-free closure on the donor site. Then the dogears were excised. Then using 3-0 Vicryl in an interrupted manner to deep tissue was closed. Then using 3-0 strata fix in a subcuticular manner the skin was closed. Then Mastisol and Steri-Strips were placed. The patient tolerated procedure well. The patient was transferred to recovery area in a stable condition.   Electronically signed by Marilynn Cain MD on 7/8/2022 at 11:06 AM for breast cancer 09/28/2021    Rectocele 09/28/2021    BMI 45 0-49 9, adult ([facility] [address] ) 09/28/2021    Chronic RUQ pain 08/20/2020    Essential hypertension 08/20/2020    Fatty liver 08/20/2020    Class 3 severe obesity due to excess calories without serious comorbidity with body mass index (BMI) of 45 0 to 49 9 in adult [facility]) 09/17/2019    Visit for screening mammogram 09/05/2018    Abnormal female pelvic exam 04/16/2018    Endometrial polyp 04/13/2018    Varicose veins of lower extremity 01/18/2017   PMH:  Menarche 16  G8, P8; SAVD x 8  LTL  Cholecystectomy  HTN '19  Morbid Obesity  R Vulvar Bx 4/16- Melanotic macule  Left abdominal wall herniorrhaphy  Menopause 54 '16  Accident above left ankle  Pelvic Pain 3/18 - US which led to concern of endometrium, suggestion of polyp                             - EMB 4/18 nl, HSC/D&C scheduled then cancelled 5/18                             - repeat US w resolution of hydrosalpinx but stable endometrium                             - EMB 7/20- nl, consideration for HSC/D&C ended [no pelvic pain, no                               PMB]     ED- Yeast 7/15/21  Rectocele - 7/21   Varicose vein surgery planned for the near future  She  has a past surgical history that includes Appendectomy; Gallbladder surgery; Hernia repair; and Tubal ligation  Her family history includes Heart attack in her father; Kidney disease in her mother; No Known Problems in her brother, daughter, daughter, daughter, sister, sister, sister, sister, son, son, son, son, and son  FH:  M - d 6/21 Ruptured Gall Bladder  She  reports that she has never smoked  She has never used smokeless tobacco  She reports that she does not drink alcohol and does not use drugs  SH:   16, father of her 8 children- he left when she was 33 [ considered her too old]  Remarried '18    She 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 a [employer] 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  She was born in [facility] and moved to the [place] in [address] [facility]  She has 5 grandchildren[ 2 girls 3 boys]  Current Outpatient Medications   Medication Sig Dispense Refill    conjugated estrogens (PREMARIN) 0 625 mg tablet Take 0 625 mg by mouth daily Take daily for 21 days then do not take for 7 days   GAS RELIEF 125 MG CAPS TAKE 1 CAPSULE BY MOUTH EVERY 6 HOURS AS NEEDED FOR FLATULENCE( ABDOMINAL BLOATING) (Patient not taking: Reported on 4/21/2021) 28 capsule 0    hydrochlorothiazide (HYDRODIURIL) 12 5 mg tablet TAKE 1 TABLET(12 5 MG) BY MOUTH DAILY 90 tablet 1     No current facility-administered medications for this visit  Current Outpatient Medications on File Prior to Visit   Medication Sig    conjugated estrogens (PREMARIN) 0 625 mg tablet Take 0 625 mg by mouth daily Take daily for 21 days then do not take for 7 days   GAS RELIEF 125 MG CAPS TAKE 1 CAPSULE BY MOUTH EVERY 6 HOURS AS NEEDED FOR FLATULENCE( ABDOMINAL BLOATING) (Patient not taking: Reported on 4/21/2021)    hydrochlorothiazide (HYDRODIURIL) 12 5 mg tablet TAKE 1 TABLET(12 5 MG) BY MOUTH DAILY     No current facility-administered medications on file prior to visit  She has No Known Allergies       Review of Systems   Constitutional: Negative for activity change, appetite change, fatigue and unexpected weight change  Eyes: Positive for pain ( since dental implants)  Negative for visual disturbance  Respiratory: Negative for cough, chest tightness, shortness of breath and wheezing  Cardiovascular: Negative for chest pain, palpitations and leg swelling  Breast: Patient denies tenderness, nipple discharge, masses, or erythema  Gastrointestinal: Negative for abdominal distention, abdominal pain, blood in stool, constipation, diarrhea, nausea and vomiting  Endocrine: Negative for cold intolerance and heat intolerance     Genitourinary: Negative for decreased urine volume, difficulty urinating, dyspareunia, dysuria, frequency, hematuria, menstrual problem, pelvic pain, vaginal bleeding, vaginal discharge and vaginal pain  Urgency:  at times  Musculoskeletal: Negative for arthralgias  Skin: Positive for rash (  lower extremities)  Neurological: Negative for weakness, light-headedness, numbness and headaches  Hematological: Does not bruise/bleed easily  Psychiatric/Behavioral: Negative for agitation, behavioral problems and sleep disturbance  The patient is not nervous/anxious  Objective:    Vitals:    09/29/21 0848   BP: 140/80   Weight: 129 kg (284 lb 6 4 oz)   Height: 5' 5" (1 651 m)            Physical Exam  Vitals and nursing note reviewed  Constitutional:       General: She is not in acute distress  Appearance: She is well-developed  HENT:      Head: Normocephalic and atraumatic  Eyes:      General: No scleral icterus  Right eye: No discharge  Left eye: No discharge  Extraocular Movements: Extraocular movements intact  Conjunctiva/sclera: Conjunctivae normal    Neck:      Thyroid: No thyromegaly  Trachea: No tracheal deviation  Cardiovascular:      Rate and Rhythm: Normal rate and regular rhythm  Heart sounds: Normal heart sounds  No murmur heard  Pulmonary:      Effort: Pulmonary effort is normal  No respiratory distress  Breath sounds: Normal breath sounds  No wheezing  Chest:      Breasts: Breasts are symmetrical          Right: No inverted nipple, mass, nipple discharge, skin change or tenderness  Left: No inverted nipple, mass, nipple discharge, skin change or tenderness  Abdominal:      General: Bowel sounds are normal  There is no distension  Palpations: Abdomen is soft  There is no mass  Tenderness: There is no abdominal tenderness  There is no guarding or rebound  Genitourinary:     General: Normal vulva  Labia:         Right: No rash, tenderness or lesion           Left: No rash, tenderness or lesion  Vagina: Normal       Cervix: No cervical motion tenderness or discharge  Uterus: Not deviated, not enlarged and not tender  Adnexa:         Right: No mass, tenderness or fullness  Left: No mass, tenderness or fullness  Rectum: No external hemorrhoid  Comments: Urethral meatus within normal limits  Perineum within normal limits  Bladder well supported  Physiologic vaginal atrophy  Stable moderate rectocele with lateral wall relaxation  The pelvis is deep  Musculoskeletal:         General: No tenderness  Normal range of motion  Cervical back: Normal range of motion and neck supple  Comments:  Small varicose veins bilaterally   Lymphadenopathy:      Cervical: No cervical adenopathy  Skin:     General: Skin is warm and dry  Findings: Rash ( stasis changes of the lower extremities especially at a prior accident site above the left ankle) present  Neurological:      Mental Status: She is alert and oriented to person, place, and time  Psychiatric:         Mood and Affect: Mood normal          Behavior: Behavior normal          Thought Content:  Thought content normal          Judgment: Judgment normal

## 2022-07-08 NOTE — ANESTHESIA PRE PROCEDURE
Department of Anesthesiology  Preprocedure Note       Name:  Marcy Oconnor   Age:  71 y.o.  :  1950                                          MRN:  [id number]         Date:  2022      Surgeon: Kalani Franklin):  Rocio Mckeon MD    Procedure: Procedure(s):  LEFT FACIAL LESION EXCISION WITH COMPLEX CLOSURE AND FROZEN SECTION * PATHOLOGY REQUIRED    Medications prior to admission:   Prior to Admission medications    Medication Sig Start Date End Date Taking? Authorizing Provider   lisinopril (PRINIVIL;ZESTRIL) 20 MG tablet Take 1 tablet by mouth daily 3/1/22   LIZETH Keene CNP   metoprolol succinate (TOPROL XL) 25 MG extended release tablet Take 1 tablet by mouth daily 3/1/22   LIZETH Keene CNP   simvastatin (ZOCOR) 40 MG tablet Take 1 tablet by mouth nightly 3/1/22   LIZETH Keene CNP   tadalafil (CIALIS) 5 MG tablet Take 1 tablet by mouth as needed for Erectile Dysfunction 21   LIZETH Keene CNP   aspirin 325 MG EC tablet Take 0.5 tablets by mouth daily 3/11/21   Shayla Lau MD   clopidogrel (PLAVIX) 75 MG tablet Take 1 tablet by mouth daily 3/11/21   Shayla Lau MD   Omeprazole 20 MG TBEC Take 20 tablets by mouth daily. Historical Provider, MD       Current medications:    No current facility-administered medications for this encounter.        Allergies:  No Known Allergies    Problem List:    Patient Active Problem List   Diagnosis Code    Essential hypertension I10    Carotid artery stenosis I65.29    GERD (gastroesophageal reflux disease) K21.9    Coronary artery disease involving native coronary artery of native heart without angina pectoris I25.10    Mediastinal lymphadenopathy R59.0    Hyperglycemia R73.9    Hyperlipidemia E78.5    Recurrent carotid stenosis, left I65.22    Carotid stenosis, right I65.21    Alcohol use Z72.89    CLL (chronic lymphocytic leukemia) (HCC) C91.10    Irregular heart beat I49.9       Past Medical History:        Diagnosis Date    Arthritis     Spine, left wrist    CAD (coronary artery disease)     heart attack x 2 with stent    CLL (chronic lymphocytic leukemia) (HCC)     Colon polyps     COPD (chronic obstructive pulmonary disease) (HCC)     History of heart artery stent     x 1 Placed in  after MI    Hyperlipidemia     Hypertension     Lymphoma ([facility] [address]     Right side of neck, no chemo or radiation    MI (myocardial infarction) ([facility] [address]     approx.  per pt.  Wears dentures     Wears eyeglasses        Past Surgical History:        Procedure Laterality Date    ARTHROTOMY      right foot big toe then removed    BACK SURGERY      CAROTID ENDARTERECTOMY Left 2014    CAROTID ENDARTERECTOMY Left 10/26/2020    with Lymph node biopsy fro left neck    CAROTID ENDARTERECTOMY Left 10/26/2020    REDO LEFT CAROTID ENDARTERECTOMY WITH LEFT CERVICAL LYMPH NODE BIOPSY performed by Oscar Fletcher MD at [address] Right 3/10/2021    RIGHT CAROTID ENDARTERECTOMY performed by Oscar Fletcher MD at [address] [place] [address] per pt.  CORONARY ANGIOPLASTY WITH STENT PLACEMENT      stent x1    EYE SURGERY Bilateral     Foreign body removal    FINGER AMPUTATION Left     ring finger    HYDROCELE EXCISION Left     LUMBAR DISC SURGERY      L5-S1    LYMPH NODE DISSECTION      right side of neck    RECTAL SURGERY      fissure    TOE SURGERY Left        Social History:    Social History     Tobacco Use    Smoking status: Former Smoker     Packs/day: 0.50     Years: 24.00     Pack years: 12.00     Types: Cigarettes     Quit date: 3/12/2020     Years since quittin.3    Smokeless tobacco: Never Used   Substance Use Topics    Alcohol use:  Yes     Alcohol/week: 4.0 standard drinks     Types: 4 Cans of beer per week     Comment: 5 x a week                                Counseling given: Not Answered      Vital Signs (Current):   Vitals:    22 1655   Weight: 214 lb (97.1 kg) NONREACTIVE 06/09/2020 08:35 AM       COVID-19 Screening (If Applicable):   Lab Results   Component Value Date/Time    COVID19 Not Detected 10/13/2021 01:24 PM    COVID19 Not Detected 10/22/2020 09:32 AM           Anesthesia Evaluation  Patient summary reviewed and Nursing notes reviewed no history of anesthetic complications:   Airway: Mallampati: II  TM distance: >3 FB   Neck ROM: full  Mouth opening: > = 3 FB   Dental:    (+) upper dentures, lower dentures and partials      Pulmonary:normal exam  breath sounds clear to auscultation  (+) COPD:                             Cardiovascular:    (+) hypertension: no interval change, past MI: no interval change, CAD: no interval change, CABG/stent:, dysrhythmias:, hyperlipidemia      ECG reviewed  Rhythm: regular  Rate: normal                    Neuro/Psych:   Negative Neuro/Psych ROS              GI/Hepatic/Renal:   (+) GERD: no interval change,           Endo/Other:    (+) : arthritis: OA., malignancy/cancer. Abdominal:       Abdomen: soft. Vascular:   + PVD, aortic or cerebral, . ROS comment: S/p CAROTID ENDARTERECTOMY. Other Findings:           Anesthesia Plan      MAC and general     ASA 3             Anesthetic plan and risks discussed with patient. Plan discussed with CRNA.                     Song Esparza MD   7/8/2022

## 2022-07-15 ENCOUNTER — TELEPHONE (OUTPATIENT)
Dept: RADIATION ONCOLOGY | Age: 72
End: 2022-07-15

## 2022-07-15 ENCOUNTER — OFFICE VISIT (OUTPATIENT)
Dept: SURGERY | Age: 72
End: 2022-07-15

## 2022-07-15 VITALS — HEART RATE: 76 BPM | WEIGHT: 199 LBS | BODY MASS INDEX: 28.49 KG/M2 | OXYGEN SATURATION: 99 % | HEIGHT: 70 IN

## 2022-07-15 DIAGNOSIS — C83.00 LYMPHOCYTIC LYMPHOMA (HCC): Primary | ICD-10-CM

## 2022-07-15 DIAGNOSIS — Z09 POSTOPERATIVE EXAMINATION: Primary | ICD-10-CM

## 2022-07-15 DIAGNOSIS — C44.92 SQUAMOUS CELL CARCINOMA OF SKIN: ICD-10-CM

## 2022-07-15 LAB — SURGICAL PATHOLOGY REPORT: NORMAL

## 2022-07-15 PROCEDURE — 99024 POSTOP FOLLOW-UP VISIT: CPT | Performed by: PLASTIC SURGERY

## 2022-07-15 NOTE — TELEPHONE ENCOUNTER
Referral in que  Pathology in chart  Contacted patient to schedule consult with Dr. Sam Jean patient seems confused about this and states he has to talk to his daughter will c/b

## 2022-07-15 NOTE — RESULT ENCOUNTER NOTE
Pt notified  of results. pt request to call his daughter to explain results   Left a voice mail for April to call back

## 2022-07-15 NOTE — PROGRESS NOTES
[address] [facility]  [facility] [address]       OFFICE POST-OP NOTE    Patient Name:  Leda Cotton    :  1950    MRN:  [id number]  STATUS POST  Chief Complaint   Patient presents with    Post-Op Check     Lesion left side of face DOS 2022       SUBJECTIVE  Patient seen and examined. 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 His surgery was performed on 2022. The pathology is still pending. PHYSICAL EXAM  Vital Signs:  Pulse 76   Ht 5' 10\" (1.778 m)   Wt 199 lb (90.3 kg)   SpO2 99%   BMI 28.55 kg/m²     Incisions:  Suture line clean dry and intact. Skin:  No evidence of infection. Neurologic:  Alert & oriented x 3. ASSESSMENT   Diagnosis Orders   1. Postoperative examination            PLAN  1. Refer to radiation oncology. Awaiting final path  Patient is to follow-up in 2 weeks. I discussed with the patient that they may make an appointment the same day with outside radiation oncology due to the distance they have to travel. Patient is to wash the area with soap and water and apply antibiotic ointment to it    Plan discussed with patient.     Electronically signed by:  Gale Mcnair M.D.   7/15/2022

## 2022-07-18 ENCOUNTER — TELEPHONE (OUTPATIENT)
Dept: SURGERY | Age: 72
End: 2022-07-18

## 2022-07-18 ENCOUNTER — TELEPHONE (OUTPATIENT)
Dept: RADIATION ONCOLOGY | Age: 72
End: 2022-07-18

## 2022-07-18 NOTE — TELEPHONE ENCOUNTER
Patient called in questioning his pathology results I explained to patient I can schedule the consult with Dr. Liya Woodard for him to go over all his questions in consult. Patient states he still needs to talk to his daughter about scheduling will c/b.

## 2022-07-18 NOTE — TELEPHONE ENCOUNTER
Pt call offices state he feel more comfortable follow up  Du Barrier at  [address] treat pt in the pass for lymphoma,write will check whit pt, he agree to plan of care

## 2022-07-25 ENCOUNTER — TELEPHONE (OUTPATIENT)
Dept: ONCOLOGY | Age: 72
End: 2022-07-25

## 2022-07-25 ENCOUNTER — INITIAL CONSULT (OUTPATIENT)
Dept: ONCOLOGY | Age: 72
End: 2022-07-25
Payer: MEDICARE

## 2022-07-25 VITALS
SYSTOLIC BLOOD PRESSURE: 105 MMHG | TEMPERATURE: 95.5 F | BODY MASS INDEX: 28.76 KG/M2 | RESPIRATION RATE: 20 BRPM | HEIGHT: 71 IN | DIASTOLIC BLOOD PRESSURE: 56 MMHG | WEIGHT: 205.4 LBS | HEART RATE: 57 BPM

## 2022-07-25 DIAGNOSIS — C91.10 CLL (CHRONIC LYMPHOCYTIC LEUKEMIA) (HCC): Primary | ICD-10-CM

## 2022-07-25 PROCEDURE — 99202 OFFICE O/P NEW SF 15 MIN: CPT

## 2022-07-25 PROCEDURE — G8427 DOCREV CUR MEDS BY ELIG CLIN: HCPCS | Performed by: INTERNAL MEDICINE

## 2022-07-25 PROCEDURE — G8417 CALC BMI ABV UP PARAM F/U: HCPCS | Performed by: INTERNAL MEDICINE

## 2022-07-25 PROCEDURE — 99205 OFFICE O/P NEW HI 60 MIN: CPT | Performed by: INTERNAL MEDICINE

## 2022-07-25 NOTE — PROGRESS NOTES
DIAGNOSIS:   FRANCIS SLL/CLL, stage 1, 10/2020  Squamous cell carcinoma, left side of face, 07/2022    CURRENT THERAPY:  Surveillance       BRIEF CASE HISTORY:   Asaf Estevez is a very pleasant 71 y.o. male who is referred to us for management of CLL. He has history of CLL with diagnosis established 10/2020 via left cervical lymph node biopsy. He briefly followed with oncology, no progression and released. He feels well overall with no symptoms. He recently had skin cancer removed on the left side of his face, squamous cell carcinoma, 07/2022 with small background CLL subcutaneous tissue. He has history of cardiac arrest and hypertension, managed with medication. He has history of alcohol abuse, currently consumes 1-2 beers daily. He has history of smoking addiction and quit 03/01/2020. PAST MEDICAL HISTORY: has a past medical history of Arthritis, CAD (coronary artery disease), CLL (chronic lymphocytic leukemia) ([facility] [address] Colon polyps, COPD (chronic obstructive pulmonary disease) ([facility] [address] History of heart artery stent, Hyperlipidemia, Hypertension, Lymphoma ([facility] [address] MI (myocardial infarction) ([address] Wears dentures, and Wears eyeglasses. PAST SURGICAL HISTORY: has a past surgical history that includes Lumbar disc surgery; Hydrocele surgery (Left); arthrotomy; lymph node dissection; Finger amputation (Left); Rectal surgery; Carotid endarterectomy (Left, 4/16/2014); Colonoscopy; Eye surgery (Bilateral); Coronary angioplasty with stent; Carotid endarterectomy (Left, 10/26/2020); Carotid endarterectomy (Left, 10/26/2020); back surgery; Carotid endarterectomy (Right, 3/10/2021); Toe Surgery (Left); pre-malignant / benign skin lesion excision (07/08/2022); and pre-malignant / benign skin lesion excision (Left, 7/8/2022).      CURRENT MEDICATIONS:  has a current medication list which includes the following prescription(s): lisinopril, metoprolol succinate, simvastatin, tadalafil, aspirin, clopidogrel, and omeprazole. ALLERGIES:  has No Known Allergies. FAMILY HISTORY: Negative for any hematological or oncological conditions. SOCIAL HISTORY:  reports that he quit smoking about 2 years ago. His smoking use included cigarettes. He has a 12.00 pack-year smoking history. He has never used smokeless tobacco. He reports current alcohol use of about 4.0 standard drinks per week. He reports that he does not use drugs. REVIEW OF SYSTEMS:   General: No fever or night sweats. Weight is stable. ENT: No double or blurred vision, no tinnitus or hearing problem, no dysphagia or sore throat   Respiratory: No chest pain, no shortness of breath, no cough or hemoptysis. Cardiovascular: Denies chest pain, PND or orthopnea. No L E swelling or palpitations. Gastrointestinal: No nausea or vomiting, abdominal pain, diarrhea or constipation. Genitourinary: Denies dysuria, hematuria, frequency, urgency or incontinence. Neurological: Denies headaches, decreased LOC, no sensory or motor focal deficits. Musculoskeletal: No arthralgia no back pain or joint swelling. Skin: There are no rashes or bleeding. Psychiatric: No anxiety, no depression. Endocrine: No diabetes or thyroid disease. Hematologic: No bleeding , no adenopathy. PHYSICAL EXAM: Shows a well appearing 71y.o.-year-old male who is not in pain or distress. Vital Signs: Blood pressure (!) 105/56, pulse 57, temperature (!) 95.5 °F (35.3 °C), temperature source Temporal, resp. rate 20, height 5' 11\" (1.803 m), weight 205 lb 6.4 oz (93.2 kg). HEENT: Normocephalic and atraumatic. Pupils are equal, round, reactive to light and accommodation. Extraocular muscles are intact. Neck: Showed no JVD, no carotid bruit . Lungs: Clear to auscultation bilaterally. Heart: Regular without any murmur. Abdomen: Soft, nontender. No hepatosplenomegaly. Extremities: Lower extremities show no edema, clubbing, or cyanosis. Breasts: Examination not done today.  Neuro exam: intact cranial nerves bilaterally no motor or sensory deficit, gait is normal. Lymphatic: 2 cm level 2 right cervical lymph node, 2 cm left sided axillary lymph node  Left side of face, recent surgery for removal of squamous cell carcinoma, extensive solar keratosis     REVIEW OF LABORATORY DATA:   Lab Results   Component Value Date    WBC 25.2 (H) 06/30/2022    HGB 13.8 06/30/2022    HCT 43.7 06/30/2022    MCV 95.6 06/30/2022     (L) 06/30/2022       Chemistry        Component Value Date/Time     06/30/2022 0757    K 4.5 06/30/2022 0757     06/30/2022 0757    CO2 24 06/30/2022 0757    BUN 15 06/30/2022 0757    CREATININE 1.06 06/30/2022 0757        Component Value Date/Time    CALCIUM 9.2 06/30/2022 0757    ALKPHOS 73 06/24/2021 0836    AST 26 06/24/2021 0836    ALT 24 06/24/2021 0836    BILITOT 0.54 06/24/2021 0836        PATHOLOGY:   10/2020:  1. LEFT CERVICAL LYMPH NODE, EXCISIONAL BIOPSY:          -   B-CHRONIC LYMPHOCYTIC LEUKEMIA. 2.  LEFT CAROTID ENDARTERECTOMY:            -   SEVERE FIBROFATTY ATHEROSCLEROSIS. REVIEW OF RADIOLOGICAL RESULTS:     IMPRESSION:   FRANCIS SLL/CLL, stage 1,  Squamous cell carcinoma, left side of face  Hypertension  History of cardiac arrest   Smoking addiction quit       PLAN:   We discussed his diagnosis and history with CLL including prognostic data. His current and historical lab work was reviewed, slow trajectory of increase. He had recent squamous cell carcinoma removed from left side of face, pathology reviewed. I explained that his disease is affecting his immune system but is largely inactive, I recommend he remain current on hi vaccinations. He is currently asymptomatic and we reviewed symptoms of progression. I discussed recommendation for monitoring with plan for treatment as needed per NCCN guidelines. Return in 1 year with repeat lab work.        Scribe Attestation   This note was created by Ivon Viveros acting as scribe for the physician signing this note  Electronically Signed  Maricarmen Rubi, 7/25/2022  Scribe, [employer]. Attending Attestation   Note was reviewed and edited.   I am in agreement with the note as entered    Almas Vivar MD  Hematologist/Medical [id number] [facility] hematology oncology physicians

## 2022-07-25 NOTE — TELEPHONE ENCOUNTER
[address] in 1 year with cbc, cmp, LDH   LABS CDP CMP [facility] ORDERS GIVEN TO PT ON EXIT TO BE DONE 1 WK PRIOR  MD VISIT July 2023  AVS PRINTED W/ INSTRUCTIONS AND GIVEN TO PT ON EXIT

## 2022-09-26 ENCOUNTER — OFFICE VISIT (OUTPATIENT)
Dept: FAMILY MEDICINE CLINIC | Age: 72
End: 2022-09-26
Payer: MEDICARE

## 2022-09-26 VITALS
OXYGEN SATURATION: 99 % | HEART RATE: 52 BPM | SYSTOLIC BLOOD PRESSURE: 132 MMHG | DIASTOLIC BLOOD PRESSURE: 67 MMHG | BODY MASS INDEX: 28.31 KG/M2 | WEIGHT: 203 LBS | TEMPERATURE: 96.9 F

## 2022-09-26 DIAGNOSIS — E78.5 HYPERLIPIDEMIA, UNSPECIFIED HYPERLIPIDEMIA TYPE: ICD-10-CM

## 2022-09-26 DIAGNOSIS — Z76.89 ENCOUNTER TO ESTABLISH CARE: Primary | ICD-10-CM

## 2022-09-26 DIAGNOSIS — H02.402 PTOSIS OF EYELID, LEFT: ICD-10-CM

## 2022-09-26 DIAGNOSIS — Z00.00 WELL ADULT EXAM: ICD-10-CM

## 2022-09-26 DIAGNOSIS — C91.10 CLL (CHRONIC LYMPHOCYTIC LEUKEMIA) (HCC): ICD-10-CM

## 2022-09-26 DIAGNOSIS — R73.9 HYPERGLYCEMIA: ICD-10-CM

## 2022-09-26 DIAGNOSIS — I10 ESSENTIAL HYPERTENSION: ICD-10-CM

## 2022-09-26 DIAGNOSIS — Z23 NEED FOR VACCINATION: ICD-10-CM

## 2022-09-26 PROCEDURE — 90694 VACC AIIV4 NO PRSRV 0.5ML IM: CPT

## 2022-09-26 PROCEDURE — 99213 OFFICE O/P EST LOW 20 MIN: CPT

## 2022-09-26 PROCEDURE — 1123F ACP DISCUSS/DSCN MKR DOCD: CPT

## 2022-09-26 PROCEDURE — G0008 ADMIN INFLUENZA VIRUS VAC: HCPCS

## 2022-09-26 RX ORDER — LISINOPRIL 5 MG/1
TABLET ORAL
COMMUNITY
Start: 2022-09-20 | End: 2022-09-26

## 2022-09-26 RX ORDER — OMEPRAZOLE 20 MG/1
CAPSULE, DELAYED RELEASE ORAL
COMMUNITY
Start: 2022-09-18

## 2022-09-26 ASSESSMENT — ENCOUNTER SYMPTOMS
ABDOMINAL PAIN: 0
CONSTIPATION: 0
EYE ITCHING: 0
EYE REDNESS: 0
BACK PAIN: 0
SORE THROAT: 0
SHORTNESS OF BREATH: 0
CHEST TIGHTNESS: 0
COLOR CHANGE: 0
NAUSEA: 0
DIARRHEA: 0
VOMITING: 0
COUGH: 0
PHOTOPHOBIA: 0
EYE PAIN: 0
WHEEZING: 0
EYE DISCHARGE: 1

## 2022-09-26 ASSESSMENT — PATIENT HEALTH QUESTIONNAIRE - PHQ9
SUM OF ALL RESPONSES TO PHQ QUESTIONS 1-9: 0
SUM OF ALL RESPONSES TO PHQ QUESTIONS 1-9: 0
SUM OF ALL RESPONSES TO PHQ9 QUESTIONS 1 & 2: 0
SUM OF ALL RESPONSES TO PHQ QUESTIONS 1-9: 0
2. FEELING DOWN, DEPRESSED OR HOPELESS: 0
1. LITTLE INTEREST OR PLEASURE IN DOING THINGS: 0
SUM OF ALL RESPONSES TO PHQ QUESTIONS 1-9: 0

## 2022-09-26 ASSESSMENT — VISUAL ACUITY: OU: 1
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
## 2022-09-27 ENCOUNTER — HOSPITAL ENCOUNTER (OUTPATIENT)
Age: 72
Setting detail: SPECIMEN
Discharge: HOME | End: 2022-09-27

## 2022-09-27 DIAGNOSIS — E78.5 HYPERLIPIDEMIA, UNSPECIFIED HYPERLIPIDEMIA TYPE: ICD-10-CM

## 2022-09-27 DIAGNOSIS — C91.10 CLL (CHRONIC LYMPHOCYTIC LEUKEMIA) (HCC): ICD-10-CM

## 2022-09-27 DIAGNOSIS — Z00.00 WELL ADULT EXAM: ICD-10-CM

## 2022-09-27 DIAGNOSIS — R73.9 HYPERGLYCEMIA: ICD-10-CM

## 2022-09-27 LAB
ABSOLUTE EOS #: 0.19 K/UL (ref 0–0.4)
ABSOLUTE IMMATURE GRANULOCYTE: 0 K/UL (ref 0–0.3)
ABSOLUTE LYMPH #: 14.94 K/UL (ref 1–4.8)
ABSOLUTE MONO #: 0.39 K/UL (ref 0.1–0.8)
ALBUMIN SERPL-MCNC: 4.6 G/DL (ref 3.5–5.2)
ALBUMIN/GLOBULIN RATIO: 2.6 (ref 1–2.5)
ALP BLD-CCNC: 75 U/L (ref 40–129)
ALT SERPL-CCNC: 19 U/L (ref 5–41)
ANION GAP SERPL CALCULATED.3IONS-SCNC: 13 MMOL/L (ref 9–17)
AST SERPL-CCNC: 23 U/L
BASOPHILS # BLD: 0 % (ref 0–2)
BASOPHILS ABSOLUTE: 0 K/UL (ref 0–0.2)
BILIRUB SERPL-MCNC: 1.1 MG/DL (ref 0.3–1.2)
BUN BLDV-MCNC: 11 MG/DL (ref 8–23)
CALCIUM SERPL-MCNC: 9.2 MG/DL (ref 8.6–10.4)
CHLORIDE BLD-SCNC: 104 MMOL/L (ref 98–107)
CHOLESTEROL/HDL RATIO: 3.4
CHOLESTEROL: 145 MG/DL
CO2: 24 MMOL/L (ref 20–31)
CREAT SERPL-MCNC: 1.03 MG/DL (ref 0.7–1.2)
EOSINOPHILS RELATIVE PERCENT: 1 % (ref 1–4)
ESTIMATED AVERAGE GLUCOSE: 123 MG/DL
GFR AFRICAN AMERICAN: >60 ML/MIN
GFR NON-AFRICAN AMERICAN: >60 ML/MIN
GFR SERPL CREATININE-BSD FRML MDRD: ABNORMAL ML/MIN/{1.73_M2}
GLUCOSE BLD-MCNC: 105 MG/DL (ref 70–99)
HBA1C MFR BLD: 5.9 % (ref 4–6)
HCT VFR BLD CALC: 45.7 % (ref 40.7–50.3)
HDLC SERPL-MCNC: 43 MG/DL
HEMOGLOBIN: 14.6 G/DL (ref 13–17)
IMMATURE GRANULOCYTES: 0 %
LACTATE DEHYDROGENASE: 208 U/L (ref 135–225)
LDL CHOLESTEROL: 80 MG/DL (ref 0–130)
LYMPHOCYTES # BLD: 77 % (ref 24–44)
MCH RBC QN AUTO: 31.2 PG (ref 25.2–33.5)
MCHC RBC AUTO-ENTMCNC: 31.9 G/DL (ref 28.4–34.8)
MCV RBC AUTO: 97.6 FL (ref 82.6–102.9)
MONOCYTES # BLD: 2 % (ref 1–7)
MORPHOLOGY: NORMAL
NRBC AUTOMATED: 0 PER 100 WBC
PDW BLD-RTO: 13.2 % (ref 11.8–14.4)
PLATELET # BLD: 96 K/UL (ref 138–453)
PMV BLD AUTO: 11.5 FL (ref 8.1–13.5)
POTASSIUM SERPL-SCNC: 4.2 MMOL/L (ref 3.7–5.3)
RBC # BLD: 4.68 M/UL (ref 4.21–5.77)
SEG NEUTROPHILS: 20 % (ref 36–66)
SEGMENTED NEUTROPHILS ABSOLUTE COUNT: 3.88 K/UL (ref 1.8–7.7)
SODIUM BLD-SCNC: 141 MMOL/L (ref 135–144)
TOTAL PROTEIN: 6.4 G/DL (ref 6.4–8.3)
TRIGL SERPL-MCNC: 110 MG/DL
TSH SERPL DL<=0.05 MIU/L-ACNC: 3.37 UIU/ML (ref 0.3–5)
WBC # BLD: 19.4 K/UL (ref 3.5–11.3)

## 2022-10-03 ENCOUNTER — TELEPHONE (OUTPATIENT)
Dept: FAMILY MEDICINE CLINIC | Age: 72
End: 2022-10-03

## 2022-10-03 NOTE — TELEPHONE ENCOUNTER
RADHA    Patient called for results and I gave them  to him and your message and he refuses to go back to   Dr. Brielle Bolaños. I told him we could send him to a different Hemo/Oncol   Pt said no. Patient said a doctor he had a few years ago told him what he has won't kill him something else will.     Juan C Thibodeaux

## 2023-01-06 ENCOUNTER — TELEPHONE (OUTPATIENT)
Dept: ONCOLOGY | Age: 73
End: 2023-01-06

## 2023-01-06 NOTE — TELEPHONE ENCOUNTER
WRITER CALLED AND SPOKE TO DEON FOR 1 YR F/U W DR PICKENS   PT REFUSED TO COME HERE ANYMORE PLEASE DO NOT SCHEDULE HIM.

## 2023-02-20 ENCOUNTER — HOSPITAL ENCOUNTER (OUTPATIENT)
Dept: NON INVASIVE DIAGNOSTICS | Age: 73
Discharge: HOME | End: 2023-02-20
Payer: MEDICARE

## 2023-02-20 ENCOUNTER — HOSPITAL ENCOUNTER (OUTPATIENT)
Dept: NUCLEAR MEDICINE | Age: 73
Discharge: HOME | End: 2023-02-22
Payer: MEDICARE

## 2023-02-20 ENCOUNTER — HOSPITAL ENCOUNTER (OUTPATIENT)
Dept: NUCLEAR MEDICINE | Age: 73
Discharge: HOME | End: 2023-02-22

## 2023-02-20 VITALS
HEART RATE: 64 BPM | RESPIRATION RATE: 18 BRPM | SYSTOLIC BLOOD PRESSURE: 110 MMHG | DIASTOLIC BLOOD PRESSURE: 53 MMHG | OXYGEN SATURATION: 95 %

## 2023-02-20 DIAGNOSIS — R07.9 CHEST PAIN, UNSPECIFIED TYPE: ICD-10-CM

## 2023-02-20 DIAGNOSIS — I25.10 CORONARY ARTERY DISEASE INVOLVING NATIVE CORONARY ARTERY OF NATIVE HEART WITHOUT ANGINA PECTORIS: ICD-10-CM

## 2023-02-20 LAB
LV EF: 35 %
LVEF MODALITY: NORMAL

## 2023-02-20 PROCEDURE — 78452 HT MUSCLE IMAGE SPECT MULT: CPT

## 2023-02-20 PROCEDURE — 6360000002 HC RX W HCPCS: Performed by: INTERNAL MEDICINE

## 2023-02-20 PROCEDURE — 3430000000 HC RX DIAGNOSTIC RADIOPHARMACEUTICAL: Performed by: INTERNAL MEDICINE

## 2023-02-20 PROCEDURE — A9500 TC99M SESTAMIBI: HCPCS | Performed by: INTERNAL MEDICINE

## 2023-02-20 PROCEDURE — 93017 CV STRESS TEST TRACING ONLY: CPT

## 2023-02-20 RX ORDER — AMINOPHYLLINE DIHYDRATE 25 MG/ML
50 INJECTION, SOLUTION INTRAVENOUS PRN
Status: DISCONTINUED | OUTPATIENT
Start: 2023-02-20 | End: 2023-02-20 | Stop reason: HOSPADM

## 2023-02-20 RX ORDER — ATROPINE SULFATE 0.1 MG/ML
0.5 INJECTION INTRAVENOUS EVERY 5 MIN PRN
Status: DISCONTINUED | OUTPATIENT
Start: 2023-02-20 | End: 2023-02-20 | Stop reason: HOSPADM

## 2023-02-20 RX ORDER — SODIUM CHLORIDE 9 MG/ML
500 INJECTION, SOLUTION INTRAVENOUS CONTINUOUS PRN
Status: DISCONTINUED | OUTPATIENT
Start: 2023-02-20 | End: 2023-02-20 | Stop reason: HOSPADM

## 2023-02-20 RX ORDER — NITROGLYCERIN 0.4 MG/1
0.4 TABLET SUBLINGUAL EVERY 5 MIN PRN
Status: DISCONTINUED | OUTPATIENT
Start: 2023-02-20 | End: 2023-02-20 | Stop reason: HOSPADM

## 2023-02-20 RX ORDER — SODIUM CHLORIDE 0.9 % (FLUSH) 0.9 %
5-40 SYRINGE (ML) INJECTION PRN
Status: DISCONTINUED | OUTPATIENT
Start: 2023-02-20 | End: 2023-02-20 | Stop reason: HOSPADM

## 2023-02-20 RX ORDER — TECHNETIUM TC-99M SESTAMIBI 1 MG/10ML
41 INJECTION INTRAVENOUS
Status: COMPLETED | OUTPATIENT
Start: 2023-02-20 | End: 2023-02-20

## 2023-02-20 RX ORDER — METOPROLOL TARTRATE 5 MG/5ML
5 INJECTION INTRAVENOUS EVERY 5 MIN PRN
Status: DISCONTINUED | OUTPATIENT
Start: 2023-02-20 | End: 2023-02-20 | Stop reason: HOSPADM

## 2023-02-20 RX ORDER — TECHNETIUM TC-99M SESTAMIBI 1 MG/10ML
15.4 INJECTION INTRAVENOUS
Status: COMPLETED | OUTPATIENT
Start: 2023-02-20 | End: 2023-02-20

## 2023-02-20 RX ADMIN — Medication 41 MILLICURIE: at 08:10

## 2023-02-20 RX ADMIN — REGADENOSON 0.4 MG: 0.08 INJECTION, SOLUTION INTRAVENOUS at 08:06

## 2023-02-20 RX ADMIN — Medication 15.4 MILLICURIE: at 06:55

## 2023-02-20 NOTE — FLOWSHEET NOTE
Patient completed lexiscan stress test. Vitals stable and charted. Patient denied any chest discomfort.

## 2023-02-20 NOTE — PROCEDURES
[address] [employer] [place] [address] [address] [facility], [facility]                              CARDIAC STRESS TEST    PATIENT NAME: Tyesha Boss                   :        1950  MED REC NO:   [id number]                             ROOM:  ACCOUNT NO:   [de-identified]                           ADMIT DATE: 2023  PROVIDER:     Edilia Kim MD    DATE OF STUDY:  2023    LEXISCAN PHARMACOLOGIC STRESS TEST    ATTENDING PROVIDER:  Johanny Mcfadden. Dharmesh May MD    PRIMARY CARE PROVIDER:  Patricia Houser CNP    PERFORMING PHYSICIAN:  Edilia Kim MD    Indication:  Chest pain. The baseline blood pressure was 123/55 mmHg with a heart rate of 59. Mary Dress was infused using the standard protocol. The patient tolerated the procedure well with: No complaints. With Lexiscan infusion, there were no significant changes in blood  pressure. The baseline EKG demonstrated: Sinus rhythm. Nonspecific ST-T wave  changes. Rare PVCs are noted during the study. EKG response is: Uninterpretable. IMPRESSION:  EKG portion of pharmacologic stress test is uninterpretable  for ischemia. Nuclear portion reported separately.         Elvia Gutierrez MD    D: 2023 9:12:00       T: 2023 9:13:28     KELSIE/STEPHEN_ALICE  Job#: [id number]     Doc#: Unknown

## 2023-03-28 ENCOUNTER — HOSPITAL ENCOUNTER (OUTPATIENT)
Dept: NON INVASIVE DIAGNOSTICS | Age: 73
Discharge: HOME | End: 2023-03-28
Payer: MEDICARE

## 2023-03-28 DIAGNOSIS — R94.39 ABNORMAL STRESS TEST: ICD-10-CM

## 2023-03-28 DIAGNOSIS — I25.10 ATHEROSCLEROSIS OF NATIVE CORONARY ARTERY WITHOUT ANGINA PECTORIS, UNSPECIFIED WHETHER NATIVE OR TRANSPLANTED HEART: ICD-10-CM

## 2023-03-28 LAB
LV EF: 33 %
LVEF MODALITY: NORMAL

## 2023-03-28 PROCEDURE — 6360000002 HC RX W HCPCS: Performed by: NURSE PRACTITIONER

## 2023-03-28 PROCEDURE — 93308 TTE F-UP OR LMTD: CPT

## 2023-03-28 RX ADMIN — PERFLUTREN 1.5 ML: 6.52 INJECTION, SUSPENSION INTRAVENOUS at 07:50

## 2023-03-31 ENCOUNTER — HOSPITAL ENCOUNTER (OUTPATIENT)
Age: 73
Discharge: HOME | End: 2023-03-31
Payer: MEDICARE

## 2023-03-31 LAB
ANION GAP SERPL CALCULATED.3IONS-SCNC: 11 MMOL/L (ref 9–17)
BUN SERPL-MCNC: 17 MG/DL (ref 8–23)
CALCIUM SERPL-MCNC: 9.5 MG/DL (ref 8.6–10.4)
CHLORIDE SERPL-SCNC: 101 MMOL/L (ref 98–107)
CO2 SERPL-SCNC: 27 MMOL/L (ref 20–31)
CREAT SERPL-MCNC: 1.14 MG/DL (ref 0.7–1.2)
GFR SERPL CREATININE-BSD FRML MDRD: >60 ML/MIN/1.73M2
GLUCOSE SERPL-MCNC: 98 MG/DL (ref 70–99)
HCT VFR BLD AUTO: 46.2 % (ref 40.7–50.3)
HGB BLD-MCNC: 14.6 G/DL (ref 13–17)
MCH RBC QN AUTO: 31 PG (ref 25.2–33.5)
MCHC RBC AUTO-ENTMCNC: 31.6 G/DL (ref 28.4–34.8)
MCV RBC AUTO: 98.1 FL (ref 82.6–102.9)
NRBC AUTOMATED: 0 PER 100 WBC
PDW BLD-RTO: 12.5 % (ref 11.8–14.4)
PLATELET # BLD AUTO: 111 K/UL (ref 138–453)
PMV BLD AUTO: 11.6 FL (ref 8.1–13.5)
POTASSIUM SERPL-SCNC: 4.7 MMOL/L (ref 3.7–5.3)
RBC # BLD: 4.71 M/UL (ref 4.21–5.77)
SODIUM SERPL-SCNC: 139 MMOL/L (ref 135–144)
WBC # BLD AUTO: 33.1 K/UL (ref 3.5–11.3)

## 2023-03-31 PROCEDURE — 36415 COLL VENOUS BLD VENIPUNCTURE: CPT

## 2023-03-31 PROCEDURE — 80048 BASIC METABOLIC PNL TOTAL CA: CPT

## 2023-03-31 PROCEDURE — 85027 COMPLETE CBC AUTOMATED: CPT

## 2023-04-05 ENCOUNTER — HOSPITAL ENCOUNTER (OUTPATIENT)
Age: 73
Setting detail: SPECIMEN
Discharge: HOME | End: 2023-04-05

## 2023-04-05 ENCOUNTER — OFFICE VISIT (OUTPATIENT)
Dept: FAMILY MEDICINE CLINIC | Age: 73
End: 2023-04-05
Payer: MEDICARE

## 2023-04-05 VITALS
OXYGEN SATURATION: 98 % | HEART RATE: 63 BPM | SYSTOLIC BLOOD PRESSURE: 132 MMHG | BODY MASS INDEX: 29.32 KG/M2 | WEIGHT: 210.2 LBS | DIASTOLIC BLOOD PRESSURE: 70 MMHG | TEMPERATURE: 98.4 F

## 2023-04-05 DIAGNOSIS — R09.89 BILATERAL CAROTID BRUITS: ICD-10-CM

## 2023-04-05 DIAGNOSIS — I25.10 CORONARY ARTERY DISEASE INVOLVING NATIVE CORONARY ARTERY OF NATIVE HEART WITHOUT ANGINA PECTORIS: ICD-10-CM

## 2023-04-05 DIAGNOSIS — C91.10 CLL (CHRONIC LYMPHOCYTIC LEUKEMIA) (HCC): ICD-10-CM

## 2023-04-05 DIAGNOSIS — C91.10 CLL (CHRONIC LYMPHOCYTIC LEUKEMIA) (HCC): Primary | ICD-10-CM

## 2023-04-05 PROCEDURE — 1036F TOBACCO NON-USER: CPT

## 2023-04-05 PROCEDURE — 3078F DIAST BP <80 MM HG: CPT

## 2023-04-05 PROCEDURE — 1123F ACP DISCUSS/DSCN MKR DOCD: CPT

## 2023-04-05 PROCEDURE — G8427 DOCREV CUR MEDS BY ELIG CLIN: HCPCS

## 2023-04-05 PROCEDURE — G8417 CALC BMI ABV UP PARAM F/U: HCPCS

## 2023-04-05 PROCEDURE — 99214 OFFICE O/P EST MOD 30 MIN: CPT

## 2023-04-05 PROCEDURE — 3075F SYST BP GE 130 - 139MM HG: CPT

## 2023-04-05 PROCEDURE — 3017F COLORECTAL CA SCREEN DOC REV: CPT

## 2023-04-05 SDOH — ECONOMIC STABILITY: INCOME INSECURITY: HOW HARD IS IT FOR YOU TO PAY FOR THE VERY BASICS LIKE FOOD, HOUSING, MEDICAL CARE, AND HEATING?: NOT HARD AT ALL

## 2023-04-05 SDOH — ECONOMIC STABILITY: FOOD INSECURITY: WITHIN THE PAST 12 MONTHS, THE FOOD YOU BOUGHT JUST DIDN'T LAST AND YOU DIDN'T HAVE MONEY TO GET MORE.: NEVER TRUE

## 2023-04-05 SDOH — ECONOMIC STABILITY: HOUSING INSECURITY
IN THE LAST 12 MONTHS, WAS THERE A TIME WHEN YOU DID NOT HAVE A STEADY PLACE TO SLEEP OR SLEPT IN A SHELTER (INCLUDING NOW)?: NO

## 2023-04-05 SDOH — ECONOMIC STABILITY: FOOD INSECURITY: WITHIN THE PAST 12 MONTHS, YOU WORRIED THAT YOUR FOOD WOULD RUN OUT BEFORE YOU GOT MONEY TO BUY MORE.: NEVER TRUE

## 2023-04-05 ASSESSMENT — ENCOUNTER SYMPTOMS
VOMITING: 0
CHEST TIGHTNESS: 1
WHEEZING: 0
BACK PAIN: 0
EYE PAIN: 0
COUGH: 0
DIARRHEA: 0
ABDOMINAL PAIN: 0
NAUSEA: 0
SORE THROAT: 0
CONSTIPATION: 0
SHORTNESS OF BREATH: 0
COLOR CHANGE: 0
EYE DISCHARGE: 0

## 2023-04-05 ASSESSMENT — PATIENT HEALTH QUESTIONNAIRE - PHQ9
SUM OF ALL RESPONSES TO PHQ QUESTIONS 1-9: 0
SUM OF ALL RESPONSES TO PHQ QUESTIONS 1-9: 0
SUM OF ALL RESPONSES TO PHQ9 QUESTIONS 1 & 2: 0
2. FEELING DOWN, DEPRESSED OR HOPELESS: 0
SUM OF ALL RESPONSES TO PHQ QUESTIONS 1-9: 0
1. LITTLE INTEREST OR PLEASURE IN DOING THINGS: 0
SUM OF ALL RESPONSES TO PHQ QUESTIONS 1-9: 0

## 2023-04-05 NOTE — PROGRESS NOTES
cardiology office w/ updated CBC results and to discuss    3. Bilateral carotid bruits  -     VL DUP CAROTID BILATERAL; Future      Return in about 3 months (around 7/5/2023), or if symptoms worsen or fail to improve, for chronic conditions. An electronic signature was used to authenticate this note.     --Nanda Lundy, APRN - CNP

## 2023-04-06 ENCOUNTER — TELEPHONE (OUTPATIENT)
Dept: FAMILY MEDICINE CLINIC | Age: 73
End: 2023-04-06

## 2023-04-06 LAB
ABSOLUTE EOS #: 0 K/UL (ref 0–0.4)
ABSOLUTE IMMATURE GRANULOCYTE: 0 K/UL (ref 0–0.3)
ABSOLUTE LYMPH #: 35.41 K/UL (ref 1–4.8)
ABSOLUTE MONO #: 1.63 K/UL (ref 0.1–0.8)
BASOPHILS # BLD: 0 % (ref 0–2)
BASOPHILS ABSOLUTE: 0 K/UL (ref 0–0.2)
EOSINOPHILS RELATIVE PERCENT: 0 % (ref 1–4)
HCT VFR BLD AUTO: 41.8 % (ref 40.7–50.3)
HGB BLD-MCNC: 13.6 G/DL (ref 13–17)
IMMATURE GRANULOCYTES: 0 %
LYMPHOCYTES # BLD: 87 % (ref 24–44)
MCH RBC QN AUTO: 31 PG (ref 25.2–33.5)
MCHC RBC AUTO-ENTMCNC: 32.5 G/DL (ref 28.4–34.8)
MCV RBC AUTO: 95.2 FL (ref 82.6–102.9)
MONOCYTES # BLD: 4 % (ref 1–7)
MORPHOLOGY: ABNORMAL
NRBC AUTOMATED: 0 PER 100 WBC
PDW BLD-RTO: 12.6 % (ref 11.8–14.4)
PLATELET # BLD AUTO: 100 K/UL (ref 138–453)
PMV BLD AUTO: 11.3 FL (ref 8.1–13.5)
RBC # BLD: 4.39 M/UL (ref 4.21–5.77)
SEG NEUTROPHILS: 9 % (ref 36–66)
SEGMENTED NEUTROPHILS ABSOLUTE COUNT: 3.66 K/UL (ref 1.8–7.7)
WBC # BLD AUTO: 40.7 K/UL (ref 3.5–11.3)

## 2023-04-06 NOTE — TELEPHONE ENCOUNTER
I called and spoke to pt personally regarding his CBC results and concerns for worsening CLL. He was dx originally in 2020 and things were considered stable and he chose no tx. With the rapid change in his WBC over the last week I encouraged him to consider contacting heme/onc to discuss theses changes as intervention may be imperative at this point. In regards to his cardiac cath that was cancelled w/ his cardiologist, I called the office and spoke w/ a nurse who directed a message to Dr Jonn Small regarding the recent values and to find out what the preference/recommendation will be in order for him to be considered clear for his needed cath. Will await return correspondence and update pt as necessary.

## 2023-04-07 ENCOUNTER — TELEPHONE (OUTPATIENT)
Dept: FAMILY MEDICINE CLINIC | Age: 73
End: 2023-04-07

## 2023-04-17 ENCOUNTER — HOSPITAL ENCOUNTER (OUTPATIENT)
Dept: CARDIAC CATH/INVASIVE PROCEDURES | Age: 73
Discharge: HOME | End: 2023-04-17
Attending: INTERNAL MEDICINE | Admitting: INTERNAL MEDICINE
Payer: MEDICARE

## 2023-04-17 VITALS
BODY MASS INDEX: 29.54 KG/M2 | RESPIRATION RATE: 10 BRPM | WEIGHT: 211 LBS | OXYGEN SATURATION: 96 % | SYSTOLIC BLOOD PRESSURE: 137 MMHG | DIASTOLIC BLOOD PRESSURE: 60 MMHG | HEIGHT: 71 IN | TEMPERATURE: 97.2 F | HEART RATE: 48 BPM

## 2023-04-17 DIAGNOSIS — I50.22 CHRONIC SYSTOLIC HEART FAILURE (HCC): Primary | ICD-10-CM

## 2023-04-17 DIAGNOSIS — I65.23 CAROTID STENOSIS, BILATERAL: Primary | ICD-10-CM

## 2023-04-17 LAB
HCT VFR BLD AUTO: 45.4 % (ref 40.7–50.3)
HGB BLD-MCNC: 14.5 G/DL (ref 13–17)
MCH RBC QN AUTO: 30.2 PG (ref 25.2–33.5)
MCHC RBC AUTO-ENTMCNC: 31.9 G/DL (ref 28.4–34.8)
MCV RBC AUTO: 94.6 FL (ref 82.6–102.9)
NRBC AUTOMATED: 0 PER 100 WBC
PDW BLD-RTO: 12.4 % (ref 11.8–14.4)
PLATELET # BLD AUTO: 100 K/UL (ref 138–453)
PMV BLD AUTO: 10.4 FL (ref 8.1–13.5)
RBC # BLD: 4.8 M/UL (ref 4.21–5.77)
WBC # BLD AUTO: 19.9 K/UL (ref 3.5–11.3)

## 2023-04-17 PROCEDURE — 6360000002 HC RX W HCPCS

## 2023-04-17 PROCEDURE — C1894 INTRO/SHEATH, NON-LASER: HCPCS

## 2023-04-17 PROCEDURE — 93005 ELECTROCARDIOGRAM TRACING: CPT | Performed by: INTERNAL MEDICINE

## 2023-04-17 PROCEDURE — 85027 COMPLETE CBC AUTOMATED: CPT

## 2023-04-17 PROCEDURE — 2580000003 HC RX 258: Performed by: INTERNAL MEDICINE

## 2023-04-17 PROCEDURE — 93458 L HRT ARTERY/VENTRICLE ANGIO: CPT

## 2023-04-17 PROCEDURE — 2500000003 HC RX 250 WO HCPCS

## 2023-04-17 PROCEDURE — C1760 CLOSURE DEV, VASC: HCPCS

## 2023-04-17 PROCEDURE — 2709999900 HC NON-CHARGEABLE SUPPLY

## 2023-04-17 PROCEDURE — 7100000011 HC PHASE II RECOVERY - ADDTL 15 MIN

## 2023-04-17 PROCEDURE — 6360000004 HC RX CONTRAST MEDICATION

## 2023-04-17 PROCEDURE — 7100000010 HC PHASE II RECOVERY - FIRST 15 MIN

## 2023-04-17 RX ORDER — ATORVASTATIN CALCIUM 80 MG/1
80 TABLET, FILM COATED ORAL DAILY
Qty: 90 TABLET | Refills: 3 | Status: SHIPPED | OUTPATIENT
Start: 2023-04-17

## 2023-04-17 RX ORDER — SODIUM CHLORIDE 0.9 % (FLUSH) 0.9 %
5-40 SYRINGE (ML) INJECTION EVERY 12 HOURS SCHEDULED
OUTPATIENT
Start: 2023-04-17

## 2023-04-17 RX ORDER — ACETAMINOPHEN 325 MG/1
650 TABLET ORAL EVERY 4 HOURS PRN
OUTPATIENT
Start: 2023-04-17

## 2023-04-17 RX ORDER — SODIUM CHLORIDE 9 MG/ML
INJECTION, SOLUTION INTRAVENOUS CONTINUOUS
Status: DISCONTINUED | OUTPATIENT
Start: 2023-04-17 | End: 2023-04-20 | Stop reason: HOSPADM

## 2023-04-17 RX ORDER — SODIUM CHLORIDE 9 MG/ML
INJECTION, SOLUTION INTRAVENOUS PRN
OUTPATIENT
Start: 2023-04-17 | End: 2023-04-17

## 2023-04-17 RX ORDER — SPIRONOLACTONE 25 MG/1
25 TABLET ORAL DAILY
Qty: 90 TABLET | Refills: 3 | Status: SHIPPED | OUTPATIENT
Start: 2023-04-17

## 2023-04-17 RX ORDER — SODIUM CHLORIDE 0.9 % (FLUSH) 0.9 %
5-40 SYRINGE (ML) INJECTION PRN
OUTPATIENT
Start: 2023-04-17

## 2023-04-17 RX ADMIN — SODIUM CHLORIDE: 9 INJECTION, SOLUTION INTRAVENOUS at 12:02

## 2023-04-17 ASSESSMENT — PAIN DESCRIPTION - LOCATION: LOCATION: BACK

## 2023-04-17 ASSESSMENT — PAIN DESCRIPTION - ORIENTATION: ORIENTATION: LOWER

## 2023-04-17 ASSESSMENT — PAIN DESCRIPTION - FREQUENCY: FREQUENCY: INTERMITTENT

## 2023-04-17 ASSESSMENT — PAIN DESCRIPTION - DESCRIPTORS
DESCRIPTORS: ACHING
DESCRIPTORS: ACHING

## 2023-04-17 ASSESSMENT — PAIN DESCRIPTION - PAIN TYPE: TYPE: CHRONIC PAIN

## 2023-04-17 ASSESSMENT — PAIN SCALES - GENERAL: PAINLEVEL_OUTOF10: 5

## 2023-04-17 ASSESSMENT — PAIN - FUNCTIONAL ASSESSMENT: PAIN_FUNCTIONAL_ASSESSMENT: 0-10

## 2023-04-17 NOTE — H&P
Chief complaint: Chest discomfort, worsening ejection fraction    History of present illness: The patient is a 80-year-old gentleman who was seen in the office by my partner Dr. Rojas Rowland on February 10, 2023. At that visit the patient had reported some increased exertional dyspnea. He also describes some atypical sounding chest discomfort. As a result of this a stress test and an echocardiogram were obtained. The results of these are noted below. Due to the abnormal findings, in particular the reduction in ejection fraction as compared to a prior echocardiogram, cardiac catheterization was recommended. This had previously been scheduled; however, due to an increased white blood cell count, the patient had the procedure delayed. He presents today for this procedure. Allergies: No known drug allergies    Medications: His home medical regimen consists of aspirin 81 mg daily, clopidogrel 75 mg daily, lisinopril 5 mg daily, metoprolol succinate 25 mg daily, omeprazole 20 mg daily, simvastatin 40 mg daily and tadalafil 5 mg daily. Past medical history significant for prior microinfarction, coronary artery disease with prior bare-metal stent placement in the left circumflex coronary artery, chronic systolic congestive heart failure, hypertension and dyslipidemia. Social history: Former smoker    Family history: No premature coronary disease in a first-degree relative    Physical examination  Vitals: Blood pressure 167/62, heart rate 58, respiratory rate 16  Cardiovascular: S1, S2.  Regular. No S3. No S4. No rub. He is radial pulses diminished. Respiratory: Vesicular breath sound  Abdomen: Soft    An echocardiogram performed on March 28, 2023 revealed an ejection fraction of 30 to 35%. Notably in December 2020, his ejection fraction was 40 to 45% by echocardiogram.    A Lexiscan Cardiolite stress test was performed 3/20/2023. This revealed brice-infarct inferolateral ischemia.   Overall it is an

## 2023-04-17 NOTE — PROCEDURES
Procedure: Left heart catheterization, coronary angiography    Final impression  1. Nonobstructive coronary arteries with patent stent in the left circumflex coronary artery. Recommendations  1. Aspirin will be discontinued and he will be maintained on clopidogrel as a single antiplatelet agent. 2.  Empagliflozin will be added to the medical regimen at a dose of 10 mg daily and spironolactone will be added to the medical regimen at a dose of 25 mg twice daily    3. A basic metabolic profile will be obtained in 1 week    4. He will follow-up in the office in a few weeks. At that time, consideration will be given to transition lisinopril to sacubitril-valsartan. Method: After the risks and benefits were explained, informed consent was obtained. He was prepped and draped in the usual sterile manner. 1% lidocaine was administered over the right groin for local anesthesia. Using direct ultrasound guidance, modified Seldinger technique was used to obtain access in the right common femoral artery and a 6 [place] 11 cm sheath was placed. JL 4 and JR4 catheters were used to perform bilateral selective coronary angiography. The JR4 catheter was used to perform left heart catheterization. Following angiography, a limited femoral angiogram was performed. This revealed suitable anatomy. A minx device was deployed. Hemostasis was achieved. The patient tolerated the procedure well. There were no complications    Findings  Coronary arteries  Left main coronary artery: No significant stenosis. Left anterior descending coronary this is a moderate size vessel with luminal irregularities. Left circumflex coronary artery: This is a moderate size vessel with minimal luminal irregularities. A previously placed stent is patent. Right coronary artery: This is a moderate size, dominant vessel. There is a 40% stenosis in the early mid segment.     Hemodynamics  /56 (80)  /9-15    Indication:

## 2023-04-17 NOTE — DISCHARGE INSTRUCTIONS
Discharge Instructions Following Heart Catheterization or Intervention    After the Procedure  The effects of the medication or sedation that were used before and/or during your procedure can last for up to 24 hours. Due to the medication or sedation in you system you should not:  Drive a car, operate machinery or power tools  Drink alcoholic beverages  Make important decisions that might be affected by your judgment    Following your procedure, rest at home for the remainder of the day. Do not exercise and do not lift heavy objects greater than 10 pounds. Days after the Procedure    1. Resume your low-fat, low-cholesterol diet upon discharge from the hospital.    2. Instructions for activities are as follows:    Femoral (Groin) Access      _____  Cardiac Cath       1. Do not drive for 3 days     2. Do not bend, push or pull more than 10 pounds for one week     3. Do not get your heart rate up via exercise for at least one        week or as indicated by your physician     4. Do not return to work for three days or as indicated by your           physician     _____  Interventional Procedure       1. Do not drive for two weeks     2. Do not bend, push, or pull more than 10 pounds for two           weeks     3. Do not get heart rate up via exercise for at least 2 weeks or                                          as indicated by your physician     4. Do not return to work until after the follow up appointment    Radial Access   1. Do not use the wrist used in the procedure to lift more than 2 pounds for 24 hours. 2. Do not participate in strenuous activities for 3-5 days after the procedure. This includes most sports - jogging, golfing, playing tennis, and bowling   3. Do not use a , motorcycle, chainsaw or all-terrain vehicle for 48 hours. 4. After three days you may gradually increase your activities until you reach your normal activity unless otherwise specified by your cardiologist    3.  To help

## 2023-04-18 LAB
EKG ATRIAL RATE: 60 BPM
EKG P AXIS: 45 DEGREES
EKG P-R INTERVAL: 170 MS
EKG Q-T INTERVAL: 432 MS
EKG QRS DURATION: 104 MS
EKG QTC CALCULATION (BAZETT): 432 MS
EKG R AXIS: 22 DEGREES
EKG T AXIS: 139 DEGREES
EKG VENTRICULAR RATE: 60 BPM

## 2023-04-18 PROCEDURE — 2709999900 HC NON-CHARGEABLE SUPPLY

## 2023-04-18 PROCEDURE — C1760 CLOSURE DEV, VASC: HCPCS

## 2023-04-18 PROCEDURE — C1894 INTRO/SHEATH, NON-LASER: HCPCS

## 2023-04-21 ENCOUNTER — TELEPHONE (OUTPATIENT)
Dept: FAMILY MEDICINE CLINIC | Age: 73
End: 2023-04-21

## 2023-04-21 NOTE — TELEPHONE ENCOUNTER
Patient states the swishing sound he use to hear through his carotid artery is gone. He said he noticed it was gone on Thursday after having heart cath done on Monday.

## 2023-04-24 ENCOUNTER — HOSPITAL ENCOUNTER (OUTPATIENT)
Age: 73
Setting detail: SPECIMEN
Discharge: HOME | End: 2023-04-24
Payer: MEDICARE

## 2023-04-24 DIAGNOSIS — I50.22 CHRONIC SYSTOLIC HEART FAILURE (HCC): ICD-10-CM

## 2023-04-24 LAB
ANION GAP SERPL CALCULATED.3IONS-SCNC: 11 MMOL/L (ref 9–17)
BUN SERPL-MCNC: 18 MG/DL (ref 8–23)
CALCIUM SERPL-MCNC: 9.7 MG/DL (ref 8.6–10.4)
CHLORIDE SERPL-SCNC: 101 MMOL/L (ref 98–107)
CO2 SERPL-SCNC: 27 MMOL/L (ref 20–31)
CREAT SERPL-MCNC: 1.17 MG/DL (ref 0.7–1.2)
GFR SERPL CREATININE-BSD FRML MDRD: >60 ML/MIN/1.73M2
GLUCOSE SERPL-MCNC: 128 MG/DL (ref 70–99)
POTASSIUM SERPL-SCNC: 5 MMOL/L (ref 3.7–5.3)
SODIUM SERPL-SCNC: 139 MMOL/L (ref 135–144)

## 2023-04-24 PROCEDURE — 80048 BASIC METABOLIC PNL TOTAL CA: CPT

## 2023-04-24 PROCEDURE — 36415 COLL VENOUS BLD VENIPUNCTURE: CPT

## 2023-07-12 ENCOUNTER — OFFICE VISIT (OUTPATIENT)
Dept: FAMILY MEDICINE CLINIC | Age: 73
End: 2023-07-12
Payer: MEDICARE

## 2023-07-12 VITALS
WEIGHT: 200 LBS | BODY MASS INDEX: 27.89 KG/M2 | OXYGEN SATURATION: 96 % | SYSTOLIC BLOOD PRESSURE: 116 MMHG | HEART RATE: 65 BPM | DIASTOLIC BLOOD PRESSURE: 60 MMHG | TEMPERATURE: 97 F

## 2023-07-12 DIAGNOSIS — Z12.5 SCREENING FOR PROSTATE CANCER: ICD-10-CM

## 2023-07-12 DIAGNOSIS — R73.9 HYPERGLYCEMIA: ICD-10-CM

## 2023-07-12 DIAGNOSIS — I10 ESSENTIAL HYPERTENSION: Primary | ICD-10-CM

## 2023-07-12 DIAGNOSIS — E78.5 HYPERLIPIDEMIA, UNSPECIFIED HYPERLIPIDEMIA TYPE: ICD-10-CM

## 2023-07-12 DIAGNOSIS — I25.10 CORONARY ARTERY DISEASE INVOLVING NATIVE CORONARY ARTERY OF NATIVE HEART WITHOUT ANGINA PECTORIS: ICD-10-CM

## 2023-07-12 DIAGNOSIS — C91.10 CLL (CHRONIC LYMPHOCYTIC LEUKEMIA) (HCC): ICD-10-CM

## 2023-07-12 DIAGNOSIS — I65.23 RECURRENT CAROTID STENOSIS, BILATERAL: ICD-10-CM

## 2023-07-12 PROCEDURE — 3017F COLORECTAL CA SCREEN DOC REV: CPT

## 2023-07-12 PROCEDURE — 1036F TOBACCO NON-USER: CPT

## 2023-07-12 PROCEDURE — G8427 DOCREV CUR MEDS BY ELIG CLIN: HCPCS

## 2023-07-12 PROCEDURE — 1123F ACP DISCUSS/DSCN MKR DOCD: CPT

## 2023-07-12 PROCEDURE — 99214 OFFICE O/P EST MOD 30 MIN: CPT

## 2023-07-12 PROCEDURE — G8417 CALC BMI ABV UP PARAM F/U: HCPCS

## 2023-07-12 PROCEDURE — 3074F SYST BP LT 130 MM HG: CPT

## 2023-07-12 PROCEDURE — 3078F DIAST BP <80 MM HG: CPT

## 2023-07-12 RX ORDER — SACUBITRIL AND VALSARTAN 24; 26 MG/1; MG/1
1 TABLET, FILM COATED ORAL 2 TIMES DAILY
COMMUNITY
Start: 2023-06-27

## 2023-07-12 ASSESSMENT — ENCOUNTER SYMPTOMS
VOMITING: 0
COLOR CHANGE: 0
CHEST TIGHTNESS: 0
WHEEZING: 0
BACK PAIN: 0
SORE THROAT: 0
DIARRHEA: 0
SHORTNESS OF BREATH: 0
COUGH: 0
NAUSEA: 0
EYE PAIN: 0
EYE DISCHARGE: 0
CONSTIPATION: 0
ABDOMINAL PAIN: 0

## 2023-07-12 NOTE — PROGRESS NOTES
Clary Muñoz (:  1950) is a 68 y.o. male,Established patient, here for evaluation of the following chief complaint(s):  Hypertension          Subjective   SUBJECTIVE/OBJECTIVE:  Pt here today for HTN f/u  VSS    Since last OV pt was able to have cardiac cath completed, there were no blockages noted and pt's EF remains low at 35%  Medication changes were made; pt is now on Entresto and Jardiance, Lisinopril was d/c'd  He is tolerating this well so far, he has been sample dependent for Jardiance d/t cost    Pt has not gone for second opinion to vascular as referred for recurrent severe bilateral carotid stenosis  He has had endarterectomy x2 to the left side and once to the right  Repeated doppler shows 70-99% occlusion of both sides  Pt endorses being able to hear a swoosh as he did prior to his surgeries but otherwise he is asymptomatic    Willing to update screening labs      Review of Systems   Constitutional:  Negative for activity change, appetite change, chills, fatigue, fever and unexpected weight change. HENT:  Negative for congestion, ear pain and sore throat. Eyes:  Negative for pain, discharge and visual disturbance. Respiratory:  Negative for cough, chest tightness, shortness of breath and wheezing. Cardiovascular:  Negative for chest pain, palpitations and leg swelling. Gastrointestinal:  Negative for abdominal pain, constipation, diarrhea, nausea and vomiting. Genitourinary:  Negative for difficulty urinating and dysuria. Musculoskeletal:  Negative for back pain, gait problem and neck pain. Skin:  Negative for color change, rash and wound. Neurological:  Negative for dizziness, seizures, syncope, weakness, light-headedness, numbness and headaches. Psychiatric/Behavioral:  Negative for confusion and sleep disturbance. Objective   Physical Exam  Vitals and nursing note reviewed. Constitutional:       General: He is not in acute distress.      Appearance: Normal

## 2023-07-13 ENCOUNTER — HOSPITAL ENCOUNTER (OUTPATIENT)
Age: 73
Setting detail: SPECIMEN
Discharge: HOME | End: 2023-07-13

## 2023-07-13 DIAGNOSIS — Z12.5 SCREENING FOR PROSTATE CANCER: ICD-10-CM

## 2023-07-13 DIAGNOSIS — R73.9 HYPERGLYCEMIA: ICD-10-CM

## 2023-07-13 DIAGNOSIS — E78.5 HYPERLIPIDEMIA, UNSPECIFIED HYPERLIPIDEMIA TYPE: ICD-10-CM

## 2023-07-13 DIAGNOSIS — I10 ESSENTIAL HYPERTENSION: ICD-10-CM

## 2023-07-13 LAB
ALBUMIN SERPL-MCNC: 4.8 G/DL (ref 3.5–5.2)
ALBUMIN/GLOB SERPL: 2.7 {RATIO} (ref 1–2.5)
ALP SERPL-CCNC: 80 U/L (ref 40–129)
ALT SERPL-CCNC: 24 U/L (ref 5–41)
ANION GAP SERPL CALCULATED.3IONS-SCNC: 17 MMOL/L (ref 9–17)
AST SERPL-CCNC: 23 U/L
BASOPHILS # BLD: 0 K/UL (ref 0–0.2)
BASOPHILS NFR BLD: 0 % (ref 0–2)
BILIRUB SERPL-MCNC: 0.9 MG/DL (ref 0.3–1.2)
BUN SERPL-MCNC: 15 MG/DL (ref 8–23)
CALCIUM SERPL-MCNC: 10 MG/DL (ref 8.6–10.4)
CHLORIDE SERPL-SCNC: 107 MMOL/L (ref 98–107)
CHOLEST SERPL-MCNC: 139 MG/DL
CHOLESTEROL/HDL RATIO: 3
CO2 SERPL-SCNC: 22 MMOL/L (ref 20–31)
CREAT SERPL-MCNC: 1 MG/DL (ref 0.7–1.2)
EOSINOPHIL # BLD: 0.26 K/UL (ref 0–0.4)
EOSINOPHILS RELATIVE PERCENT: 1 % (ref 1–4)
ERYTHROCYTE [DISTWIDTH] IN BLOOD BY AUTOMATED COUNT: 14.8 % (ref 11.8–14.4)
EST. AVERAGE GLUCOSE BLD GHB EST-MCNC: 108 MG/DL
GFR SERPL CREATININE-BSD FRML MDRD: >60 ML/MIN/1.73M2
GLUCOSE SERPL-MCNC: 89 MG/DL (ref 70–99)
HBA1C MFR BLD: 5.4 % (ref 4–6)
HCT VFR BLD AUTO: 45.1 % (ref 40.7–50.3)
HDLC SERPL-MCNC: 47 MG/DL
HGB BLD-MCNC: 14.3 G/DL (ref 13–17)
IMM GRANULOCYTES # BLD AUTO: 0 K/UL (ref 0–0.3)
IMM GRANULOCYTES NFR BLD: 0 %
LDLC SERPL CALC-MCNC: 71 MG/DL (ref 0–130)
LYMPHOCYTES # BLD: 84 % (ref 24–44)
LYMPHOCYTES NFR BLD: 21.5 K/UL (ref 1–4.8)
MCH RBC QN AUTO: 32 PG (ref 25.2–33.5)
MCHC RBC AUTO-ENTMCNC: 31.7 G/DL (ref 28.4–34.8)
MCV RBC AUTO: 100.9 FL (ref 82.6–102.9)
MONOCYTES NFR BLD: 0.77 K/UL (ref 0.1–0.8)
MONOCYTES NFR BLD: 3 % (ref 1–7)
MORPHOLOGY: ABNORMAL
NEUTROPHILS NFR BLD: 12 % (ref 36–66)
NEUTS SEG NFR BLD: 3.07 K/UL (ref 1.8–7.7)
NRBC BLD-RTO: 0.1 PER 100 WBC
PLATELET # BLD AUTO: 92 K/UL (ref 138–453)
PMV BLD AUTO: 11.5 FL (ref 8.1–13.5)
POTASSIUM SERPL-SCNC: 4.9 MMOL/L (ref 3.7–5.3)
PROT SERPL-MCNC: 6.6 G/DL (ref 6.4–8.3)
PSA SERPL-MCNC: 0.66 NG/ML
RBC # BLD AUTO: 4.47 M/UL (ref 4.21–5.77)
SODIUM SERPL-SCNC: 146 MMOL/L (ref 135–144)
T4 FREE SERPL-MCNC: 1.3 NG/DL (ref 0.9–1.7)
TRIGL SERPL-MCNC: 105 MG/DL
TSH SERPL DL<=0.05 MIU/L-ACNC: 5.59 UIU/ML (ref 0.3–5)
WBC OTHER # BLD: 25.6 K/UL (ref 3.5–11.3)

## 2023-07-14 DIAGNOSIS — E03.8 SUBCLINICAL HYPOTHYROIDISM: Primary | ICD-10-CM

## 2023-10-03 ENCOUNTER — OFFICE VISIT (OUTPATIENT)
Dept: FAMILY MEDICINE CLINIC | Age: 73
End: 2023-10-03
Payer: MEDICARE

## 2023-10-03 VITALS
SYSTOLIC BLOOD PRESSURE: 138 MMHG | HEART RATE: 63 BPM | BODY MASS INDEX: 28.49 KG/M2 | DIASTOLIC BLOOD PRESSURE: 60 MMHG | TEMPERATURE: 97.6 F | HEIGHT: 70 IN | OXYGEN SATURATION: 97 % | WEIGHT: 199 LBS

## 2023-10-03 DIAGNOSIS — Z23 NEEDS FLU SHOT: ICD-10-CM

## 2023-10-03 DIAGNOSIS — Z00.00 MEDICARE ANNUAL WELLNESS VISIT, SUBSEQUENT: Primary | ICD-10-CM

## 2023-10-03 PROCEDURE — 1123F ACP DISCUSS/DSCN MKR DOCD: CPT

## 2023-10-03 PROCEDURE — G0439 PPPS, SUBSEQ VISIT: HCPCS

## 2023-10-03 PROCEDURE — G8484 FLU IMMUNIZE NO ADMIN: HCPCS

## 2023-10-03 PROCEDURE — 90694 VACC AIIV4 NO PRSRV 0.5ML IM: CPT

## 2023-10-03 PROCEDURE — 3078F DIAST BP <80 MM HG: CPT

## 2023-10-03 PROCEDURE — 3017F COLORECTAL CA SCREEN DOC REV: CPT

## 2023-10-03 PROCEDURE — 3075F SYST BP GE 130 - 139MM HG: CPT

## 2023-10-03 PROCEDURE — G0008 ADMIN INFLUENZA VIRUS VAC: HCPCS

## 2023-10-03 SDOH — ECONOMIC STABILITY: INCOME INSECURITY: HOW HARD IS IT FOR YOU TO PAY FOR THE VERY BASICS LIKE FOOD, HOUSING, MEDICAL CARE, AND HEATING?: NOT HARD AT ALL

## 2023-10-03 SDOH — ECONOMIC STABILITY: FOOD INSECURITY: WITHIN THE PAST 12 MONTHS, YOU WORRIED THAT YOUR FOOD WOULD RUN OUT BEFORE YOU GOT MONEY TO BUY MORE.: NEVER TRUE

## 2023-10-03 SDOH — ECONOMIC STABILITY: FOOD INSECURITY: WITHIN THE PAST 12 MONTHS, THE FOOD YOU BOUGHT JUST DIDN'T LAST AND YOU DIDN'T HAVE MONEY TO GET MORE.: NEVER TRUE

## 2023-10-03 ASSESSMENT — PATIENT HEALTH QUESTIONNAIRE - PHQ9
SUM OF ALL RESPONSES TO PHQ QUESTIONS 1-9: 0
SUM OF ALL RESPONSES TO PHQ9 QUESTIONS 1 & 2: 0
SUM OF ALL RESPONSES TO PHQ QUESTIONS 1-9: 0
2. FEELING DOWN, DEPRESSED OR HOPELESS: 0
1. LITTLE INTEREST OR PLEASURE IN DOING THINGS: 0

## 2023-10-03 ASSESSMENT — LIFESTYLE VARIABLES
HAS A RELATIVE, FRIEND, DOCTOR, OR ANOTHER HEALTH PROFESSIONAL EXPRESSED CONCERN ABOUT YOUR DRINKING OR SUGGESTED YOU CUT DOWN: 0
HOW OFTEN DURING THE LAST YEAR HAVE YOU NEEDED AN ALCOHOLIC DRINK FIRST THING IN THE MORNING TO GET YOURSELF GOING AFTER A NIGHT OF HEAVY DRINKING: 0
HOW MANY STANDARD DRINKS CONTAINING ALCOHOL DO YOU HAVE ON A TYPICAL DAY: 3 OR 4
HAVE YOU OR SOMEONE ELSE BEEN INJURED AS A RESULT OF YOUR DRINKING: 0
HOW OFTEN DURING THE LAST YEAR HAVE YOU HAD A FEELING OF GUILT OR REMORSE AFTER DRINKING: 0
HOW OFTEN DURING THE LAST YEAR HAVE YOU BEEN UNABLE TO REMEMBER WHAT HAPPENED THE NIGHT BEFORE BECAUSE YOU HAD BEEN DRINKING: 0
HOW OFTEN DURING THE LAST YEAR HAVE YOU FAILED TO DO WHAT WAS NORMALLY EXPECTED FROM YOU BECAUSE OF DRINKING: 0
HOW OFTEN DO YOU HAVE A DRINK CONTAINING ALCOHOL: 4 OR MORE TIMES A WEEK
HOW OFTEN DURING THE LAST YEAR HAVE YOU FOUND THAT YOU WERE NOT ABLE TO STOP DRINKING ONCE YOU HAD STARTED: 0

## 2023-10-03 NOTE — PROGRESS NOTES
Medicare Annual Wellness Visit    Zoe Mckinley is here for Medicare AWV    Assessment & Plan   Medicare annual wellness visit, subsequent    Needs flu shot  -     Influenza, FLUAD, (age 72 y+), IM, Preservative Free, 0.5 mL    Recommendations for Preventive Services Due: see orders and patient instructions/AVS.  Recommended screening schedule for the next 5-10 years is provided to the patient in written form: see Patient Instructions/AVS.     Return if symptoms worsen or fail to improve. Subjective       Patient's complete Health Risk Assessment and screening values have been reviewed and are found in Flowsheets. The following problems were reviewed today and where indicated follow up appointments were made and/or referrals ordered. Positive Risk Factor Screenings with Interventions:         Alcohol Screening:  Alcohol Use: Heavy Drinker (10/3/2023)    AUDIT-C     Frequency of Alcohol Consumption: 4 or more times a week     Average Number of Drinks: 3 or 4     Frequency of Binge Drinking: Never      AUDIT-C Score: 5   AUDIT Total Score: 5    Interpretation of AUDIT-C score:   3-7 indicates potential alcohol risk. 8 or more is associated with harmful or hazardous drinking. 15 or more in women, and 15 or more in men, is likely to indicate alcohol dependence.   Interventions:  Patient declined any further intervention or treatment             General HRA Questions:  Select all that apply: (!) Loneliness    Loneliness Interventions:  Patient declined any further interventions or treatment      Social and Emotional Support:  Do you get the social and emotional support that you need?: (!) No    Interventions:  Patient declined any further interventions or treatment        Safety:  Do you have either shower bars, grab bars, non-slip mats or non-slip surfaces in your shower or bathtub?: (!) No  Interventions:  Patient declined any further interventions or treatment     Advanced Directives:  Do you have a
None

## 2023-10-30 ENCOUNTER — TELEPHONE (OUTPATIENT)
Dept: FAMILY MEDICINE CLINIC | Age: 73
End: 2023-10-30

## 2023-10-30 NOTE — TELEPHONE ENCOUNTER
Patient called Dr. Becca Mcginnis office and they are checking this out for him. Will get back to him today or tomorrow.

## 2023-10-30 NOTE — TELEPHONE ENCOUNTER
Is that effective at the end of the year? He has a procedure scheduled w/ them so I'm confused as to why he would suddenly not be in network.

## 2024-01-12 ENCOUNTER — OFFICE VISIT (OUTPATIENT)
Dept: FAMILY MEDICINE CLINIC | Age: 74
End: 2024-01-12
Payer: MEDICARE

## 2024-01-12 ENCOUNTER — HOSPITAL ENCOUNTER (OUTPATIENT)
Age: 74
Setting detail: SPECIMEN
Discharge: HOME | End: 2024-01-12

## 2024-01-12 VITALS
HEART RATE: 67 BPM | DIASTOLIC BLOOD PRESSURE: 72 MMHG | TEMPERATURE: 97.5 F | BODY MASS INDEX: 29.16 KG/M2 | SYSTOLIC BLOOD PRESSURE: 136 MMHG | OXYGEN SATURATION: 99 % | WEIGHT: 203.2 LBS

## 2024-01-12 DIAGNOSIS — Z23 IMMUNIZATION DUE: ICD-10-CM

## 2024-01-12 DIAGNOSIS — I65.22 RECURRENT CAROTID STENOSIS, LEFT: ICD-10-CM

## 2024-01-12 DIAGNOSIS — E03.8 SUBCLINICAL HYPOTHYROIDISM: ICD-10-CM

## 2024-01-12 DIAGNOSIS — E78.5 HYPERLIPIDEMIA, UNSPECIFIED HYPERLIPIDEMIA TYPE: ICD-10-CM

## 2024-01-12 DIAGNOSIS — I25.5 ISCHEMIC CARDIOMYOPATHY: ICD-10-CM

## 2024-01-12 DIAGNOSIS — R73.9 HYPERGLYCEMIA: ICD-10-CM

## 2024-01-12 DIAGNOSIS — I10 ESSENTIAL HYPERTENSION: ICD-10-CM

## 2024-01-12 DIAGNOSIS — C91.10 CLL (CHRONIC LYMPHOCYTIC LEUKEMIA) (HCC): ICD-10-CM

## 2024-01-12 DIAGNOSIS — I25.10 CORONARY ARTERY DISEASE INVOLVING NATIVE CORONARY ARTERY OF NATIVE HEART WITHOUT ANGINA PECTORIS: Primary | ICD-10-CM

## 2024-01-12 LAB
ALBUMIN SERPL-MCNC: 4.8 G/DL (ref 3.5–5.2)
ALBUMIN/GLOB SERPL: 3 {RATIO} (ref 1–2.5)
ALP SERPL-CCNC: 82 U/L (ref 40–129)
ALT SERPL-CCNC: 26 U/L (ref 10–50)
ANION GAP SERPL CALCULATED.3IONS-SCNC: 10 MMOL/L (ref 9–16)
AST SERPL-CCNC: 27 U/L (ref 10–50)
BASOPHILS # BLD: 0 K/UL (ref 0–0.2)
BASOPHILS NFR BLD: 0 % (ref 0–2)
BILIRUB SERPL-MCNC: 0.9 MG/DL (ref 0–1.2)
BUN SERPL-MCNC: 12 MG/DL (ref 8–23)
CALCIUM SERPL-MCNC: 9 MG/DL (ref 8.6–10.4)
CHLORIDE SERPL-SCNC: 102 MMOL/L (ref 98–107)
CO2 SERPL-SCNC: 28 MMOL/L (ref 20–31)
CREAT SERPL-MCNC: 1 MG/DL (ref 0.7–1.2)
EOSINOPHIL # BLD: 0.27 K/UL (ref 0–0.4)
EOSINOPHILS RELATIVE PERCENT: 1 % (ref 1–4)
ERYTHROCYTE [DISTWIDTH] IN BLOOD BY AUTOMATED COUNT: 13 % (ref 11.8–14.4)
EST. AVERAGE GLUCOSE BLD GHB EST-MCNC: 123 MG/DL
GFR SERPL CREATININE-BSD FRML MDRD: >60 ML/MIN/1.73M2
GLUCOSE SERPL-MCNC: 112 MG/DL (ref 74–99)
HBA1C MFR BLD: 5.9 % (ref 4–6)
HCT VFR BLD AUTO: 48.7 % (ref 40.7–50.3)
HGB BLD-MCNC: 15.4 G/DL (ref 13–17)
IMM GRANULOCYTES # BLD AUTO: 0 K/UL (ref 0–0.3)
IMM GRANULOCYTES NFR BLD: 0 %
LYMPHOCYTES NFR BLD: 22.58 K/UL (ref 1–4.8)
LYMPHOCYTES RELATIVE PERCENT: 83 % (ref 24–44)
MAGNESIUM SERPL-MCNC: 2.4 MG/DL (ref 1.6–2.4)
MCH RBC QN AUTO: 31 PG (ref 25.2–33.5)
MCHC RBC AUTO-ENTMCNC: 31.6 G/DL (ref 28.4–34.8)
MCV RBC AUTO: 98.2 FL (ref 82.6–102.9)
MONOCYTES NFR BLD: 0.54 K/UL (ref 0.1–0.8)
MONOCYTES NFR BLD: 2 % (ref 1–7)
MORPHOLOGY: NORMAL
NEUTROPHILS NFR BLD: 14 % (ref 36–66)
NEUTS SEG NFR BLD: 3.81 K/UL (ref 1.8–7.7)
NRBC BLD-RTO: 0 PER 100 WBC
PLATELET # BLD AUTO: 105 K/UL (ref 138–453)
PMV BLD AUTO: 11.3 FL (ref 8.1–13.5)
POTASSIUM SERPL-SCNC: 4.5 MMOL/L (ref 3.7–5.3)
PROT SERPL-MCNC: 6.4 G/DL (ref 6.6–8.7)
RBC # BLD AUTO: 4.96 M/UL (ref 4.21–5.77)
SODIUM SERPL-SCNC: 140 MMOL/L (ref 136–145)
T4 FREE SERPL-MCNC: 1.2 NG/DL (ref 0.9–1.7)
TSH SERPL DL<=0.05 MIU/L-ACNC: 6.28 UIU/ML (ref 0.27–4.2)
WBC OTHER # BLD: 27.2 K/UL (ref 3.5–11.3)

## 2024-01-12 PROCEDURE — G8427 DOCREV CUR MEDS BY ELIG CLIN: HCPCS

## 2024-01-12 PROCEDURE — 90473 IMMUNE ADMIN ORAL/NASAL: CPT

## 2024-01-12 PROCEDURE — 3078F DIAST BP <80 MM HG: CPT

## 2024-01-12 PROCEDURE — G8484 FLU IMMUNIZE NO ADMIN: HCPCS

## 2024-01-12 PROCEDURE — 99214 OFFICE O/P EST MOD 30 MIN: CPT

## 2024-01-12 PROCEDURE — 90677 PCV20 VACCINE IM: CPT

## 2024-01-12 PROCEDURE — 3017F COLORECTAL CA SCREEN DOC REV: CPT

## 2024-01-12 PROCEDURE — 3075F SYST BP GE 130 - 139MM HG: CPT

## 2024-01-12 PROCEDURE — 1036F TOBACCO NON-USER: CPT

## 2024-01-12 PROCEDURE — G0009 ADMIN PNEUMOCOCCAL VACCINE: HCPCS

## 2024-01-12 PROCEDURE — G8417 CALC BMI ABV UP PARAM F/U: HCPCS

## 2024-01-12 PROCEDURE — 1123F ACP DISCUSS/DSCN MKR DOCD: CPT

## 2024-01-12 SDOH — ECONOMIC STABILITY: INCOME INSECURITY: HOW HARD IS IT FOR YOU TO PAY FOR THE VERY BASICS LIKE FOOD, HOUSING, MEDICAL CARE, AND HEATING?: NOT HARD AT ALL

## 2024-01-12 SDOH — ECONOMIC STABILITY: FOOD INSECURITY: WITHIN THE PAST 12 MONTHS, THE FOOD YOU BOUGHT JUST DIDN'T LAST AND YOU DIDN'T HAVE MONEY TO GET MORE.: NEVER TRUE

## 2024-01-12 SDOH — ECONOMIC STABILITY: FOOD INSECURITY: WITHIN THE PAST 12 MONTHS, YOU WORRIED THAT YOUR FOOD WOULD RUN OUT BEFORE YOU GOT MONEY TO BUY MORE.: NEVER TRUE

## 2024-01-12 ASSESSMENT — PATIENT HEALTH QUESTIONNAIRE - PHQ9: DEPRESSION UNABLE TO ASSESS: URGENT/EMERGENT SITUATION

## 2024-01-12 NOTE — PROGRESS NOTES
Mina Goldberg (:  1950) is a 73 y.o. male,Established patient, here for evaluation of the following chief complaint(s):  Hypertension          Subjective   SUBJECTIVE/OBJECTIVE:  Pt here today for 6 month f/u  VSS    Since last OV he found out his cardiologist he was seeing was not within network  He has upcoming appt w/ Dr Schaefer 24    No new sx, continues to anticipate placement of AICD   He was able to get pt assistance for both Entresto and Jardiance    He has routine f/u scheduled w/ heme/onc and a new pt appt w/ urology for poor urine stream    No acute concerns, agreeable to update non-fasting labs          Review of Systems       Objective   Physical Exam  Vitals and nursing note reviewed.   Constitutional:       General: He is not in acute distress.     Appearance: Normal appearance. He is not ill-appearing, toxic-appearing or diaphoretic.   Neck:      Vascular: Carotid bruit present.   Cardiovascular:      Rate and Rhythm: Bradycardia present. Rhythm irregular.      Heart sounds: Murmur heard.   Pulmonary:      Effort: Pulmonary effort is normal.      Breath sounds: Normal breath sounds.   Musculoskeletal:      Right lower leg: No edema.      Left lower leg: No edema.   Skin:     General: Skin is warm and dry.   Neurological:      General: No focal deficit present.      Mental Status: He is alert. Mental status is at baseline.                 ASSESSMENT/PLAN:  1. Coronary artery disease involving native coronary artery of native heart without angina pectoris  2. Ischemic cardiomyopathy  -keep upcoming appt to establish w/ new cardiologist    3. Essential hypertension  -stable    -     CBC with Auto Differential; Future  -     Comprehensive Metabolic Panel; Future  -     Magnesium; Future    4. Recurrent carotid stenosis, left  -pt declines re-eval    5. CLL (chronic lymphocytic leukemia) (HCC)  -following w/ heme/onc    6. Hyperglycemia  -     Hemoglobin A1C; Future    7. Hyperlipidemia,

## 2024-01-15 ENCOUNTER — OFFICE VISIT (OUTPATIENT)
Dept: UROLOGY | Age: 74
End: 2024-01-15
Payer: MEDICARE

## 2024-01-15 VITALS
TEMPERATURE: 98.2 F | HEIGHT: 70 IN | BODY MASS INDEX: 28.63 KG/M2 | WEIGHT: 200 LBS | HEART RATE: 70 BPM | DIASTOLIC BLOOD PRESSURE: 80 MMHG | SYSTOLIC BLOOD PRESSURE: 130 MMHG | OXYGEN SATURATION: 100 %

## 2024-01-15 DIAGNOSIS — N40.1 BPH WITH OBSTRUCTION/LOWER URINARY TRACT SYMPTOMS: Primary | ICD-10-CM

## 2024-01-15 DIAGNOSIS — R35.0 FREQUENCY OF URINATION: ICD-10-CM

## 2024-01-15 DIAGNOSIS — N13.8 BPH WITH OBSTRUCTION/LOWER URINARY TRACT SYMPTOMS: Primary | ICD-10-CM

## 2024-01-15 DIAGNOSIS — R39.14 FEELING OF INCOMPLETE BLADDER EMPTYING: ICD-10-CM

## 2024-01-15 DIAGNOSIS — R39.12 WEAK URINARY STREAM: ICD-10-CM

## 2024-01-15 DIAGNOSIS — E03.8 SUBCLINICAL HYPOTHYROIDISM: Primary | ICD-10-CM

## 2024-01-15 PROCEDURE — G8484 FLU IMMUNIZE NO ADMIN: HCPCS | Performed by: UROLOGY

## 2024-01-15 PROCEDURE — G8428 CUR MEDS NOT DOCUMENT: HCPCS | Performed by: UROLOGY

## 2024-01-15 PROCEDURE — 99204 OFFICE O/P NEW MOD 45 MIN: CPT | Performed by: UROLOGY

## 2024-01-15 PROCEDURE — 3017F COLORECTAL CA SCREEN DOC REV: CPT | Performed by: UROLOGY

## 2024-01-15 PROCEDURE — 3079F DIAST BP 80-89 MM HG: CPT | Performed by: UROLOGY

## 2024-01-15 PROCEDURE — G8417 CALC BMI ABV UP PARAM F/U: HCPCS | Performed by: UROLOGY

## 2024-01-15 PROCEDURE — 3075F SYST BP GE 130 - 139MM HG: CPT | Performed by: UROLOGY

## 2024-01-15 PROCEDURE — 1123F ACP DISCUSS/DSCN MKR DOCD: CPT | Performed by: UROLOGY

## 2024-01-15 PROCEDURE — 1036F TOBACCO NON-USER: CPT | Performed by: UROLOGY

## 2024-01-15 RX ORDER — TAMSULOSIN HYDROCHLORIDE 0.4 MG/1
0.4 CAPSULE ORAL DAILY
Qty: 90 CAPSULE | Refills: 3 | Status: SHIPPED | OUTPATIENT
Start: 2024-01-15

## 2024-01-15 RX ORDER — LEVOTHYROXINE SODIUM 0.03 MG/1
25 TABLET ORAL DAILY
Qty: 60 TABLET | Refills: 0 | Status: SHIPPED | OUTPATIENT
Start: 2024-01-15

## 2024-01-15 ASSESSMENT — ENCOUNTER SYMPTOMS
ALLERGIC/IMMUNOLOGIC NEGATIVE: 1
GASTROINTESTINAL NEGATIVE: 1
ABDOMINAL PAIN: 0
COUGH: 0
WHEEZING: 0
EYE PAIN: 0
NAUSEA: 0
VOMITING: 0
SHORTNESS OF BREATH: 1
BACK PAIN: 0
EYES NEGATIVE: 1
COLOR CHANGE: 0
EYE REDNESS: 0

## 2024-01-15 NOTE — PROGRESS NOTES
Review of Systems   Constitutional: Negative.  Negative for appetite change, chills and fever.   HENT: Negative.     Eyes: Negative.  Negative for pain, redness and visual disturbance.   Respiratory:  Positive for shortness of breath. Negative for cough and wheezing.    Cardiovascular:  Negative for chest pain and leg swelling.   Gastrointestinal: Negative.  Negative for abdominal pain, nausea and vomiting.   Genitourinary:  Positive for frequency. Negative for difficulty urinating, dysuria, flank pain, hematuria, testicular pain and urgency.   Musculoskeletal: Negative.  Negative for back pain, joint swelling and myalgias.   Skin: Negative.  Negative for color change, rash and wound.   Allergic/Immunologic: Negative.    Neurological: Negative.  Negative for dizziness, tremors, weakness, numbness and headaches.   Hematological: Negative.  Negative for adenopathy. Does not bruise/bleed easily.   Psychiatric/Behavioral: Negative.       
CVA,  No flank tenderness,  Orhepatosplenomegaly   Lymphatics: No palpable lymphadenopathy.  Bladder non-tender and not distended.  Musculoskeletal: Normal gait and station        Assessment and Plan      1. BPH with obstruction/lower urinary tract symptoms    2. Weak urinary stream    3. Feeling of incomplete bladder emptying    4. Frequency of urination           Plan:  Flomax  Fu 2 mo bladder scan       Prescriptions Ordered:  Orders Placed This Encounter   Medications    tamsulosin (FLOMAX) 0.4 MG capsule     Sig: Take 1 capsule by mouth daily     Dispense:  90 capsule     Refill:  3      Orders Placed:  No orders of the defined types were placed in this encounter.            Taz Hawk MD    Agree with the ROS entered by the MA.

## 2024-02-18 PROBLEM — I42.9 CARDIOMYOPATHY (HCC): Status: ACTIVE | Noted: 2024-02-18

## 2024-02-28 ENCOUNTER — TELEPHONE (OUTPATIENT)
Dept: FAMILY MEDICINE CLINIC | Age: 74
End: 2024-02-28

## 2024-02-28 NOTE — TELEPHONE ENCOUNTER
Patient advised  
Patient calling regarding MUGA results. Please advise.  
This is a cardiology test so they would result it  
No indicators present

## 2024-03-04 ENCOUNTER — TELEPHONE (OUTPATIENT)
Dept: UROLOGY | Age: 74
End: 2024-03-04

## 2024-03-04 NOTE — TELEPHONE ENCOUNTER
Patient called in and left a voicemail about wanting a call back. Writer called patient back and left a voicemail asking for a returned telephone call.

## 2024-03-06 ENCOUNTER — HOSPITAL ENCOUNTER (OUTPATIENT)
Age: 74
Setting detail: SPECIMEN
Discharge: HOME | End: 2024-03-06

## 2024-03-06 DIAGNOSIS — E03.8 SUBCLINICAL HYPOTHYROIDISM: ICD-10-CM

## 2024-03-06 LAB — TSH SERPL DL<=0.05 MIU/L-ACNC: 4.84 UIU/ML (ref 0.27–4.2)

## 2024-03-07 DIAGNOSIS — E03.8 SUBCLINICAL HYPOTHYROIDISM: ICD-10-CM

## 2024-03-07 RX ORDER — LEVOTHYROXINE SODIUM 0.03 MG/1
25 TABLET ORAL DAILY
Qty: 90 TABLET | Refills: 0 | Status: SHIPPED | OUTPATIENT
Start: 2024-03-07

## 2024-03-22 PROBLEM — I50.22 CHRONIC SYSTOLIC HEART FAILURE (HCC): Status: ACTIVE | Noted: 2024-03-22

## 2024-04-09 ENCOUNTER — TELEPHONE (OUTPATIENT)
Dept: FAMILY MEDICINE CLINIC | Age: 74
End: 2024-04-09

## 2024-04-09 NOTE — TELEPHONE ENCOUNTER
Patient calling in to see if paperwork he dropped off yesterday was done, he said reception told him it would be done today.

## 2024-04-16 DIAGNOSIS — C91.10 CLL (CHRONIC LYMPHOCYTIC LEUKEMIA) (HCC): Primary | ICD-10-CM

## 2024-04-19 ENCOUNTER — OFFICE VISIT (OUTPATIENT)
Dept: ONCOLOGY | Age: 74
End: 2024-04-19
Payer: MEDICARE

## 2024-04-19 ENCOUNTER — TELEPHONE (OUTPATIENT)
Dept: ONCOLOGY | Age: 74
End: 2024-04-19

## 2024-04-19 ENCOUNTER — HOSPITAL ENCOUNTER (OUTPATIENT)
Age: 74
Setting detail: SPECIMEN
Discharge: HOME | End: 2024-04-19
Payer: MEDICARE

## 2024-04-19 VITALS
DIASTOLIC BLOOD PRESSURE: 50 MMHG | HEART RATE: 58 BPM | SYSTOLIC BLOOD PRESSURE: 105 MMHG | RESPIRATION RATE: 18 BRPM | WEIGHT: 199.8 LBS | TEMPERATURE: 97.9 F | BODY MASS INDEX: 28.67 KG/M2

## 2024-04-19 DIAGNOSIS — D69.6 THROMBOCYTOPENIA, UNSPECIFIED (HCC): ICD-10-CM

## 2024-04-19 DIAGNOSIS — C91.10 CLL (CHRONIC LYMPHOCYTIC LEUKEMIA) (HCC): ICD-10-CM

## 2024-04-19 DIAGNOSIS — C91.10 CLL (CHRONIC LYMPHOCYTIC LEUKEMIA) (HCC): Primary | ICD-10-CM

## 2024-04-19 LAB
ALBUMIN SERPL-MCNC: 4.5 G/DL (ref 3.5–5.2)
ALP SERPL-CCNC: 81 U/L (ref 40–129)
ALT SERPL-CCNC: 24 U/L (ref 5–41)
ANION GAP SERPL CALCULATED.3IONS-SCNC: 6 MMOL/L (ref 9–17)
AST SERPL-CCNC: 19 U/L
BASOPHILS # BLD: 0 K/UL (ref 0–0.2)
BASOPHILS NFR BLD: 0 %
BILIRUB SERPL-MCNC: 0.7 MG/DL (ref 0.3–1.2)
BUN SERPL-MCNC: 13 MG/DL (ref 8–23)
BUN/CREAT SERPL: 16 (ref 9–20)
CALCIUM SERPL-MCNC: 9.3 MG/DL (ref 8.6–10.4)
CHLORIDE SERPL-SCNC: 103 MMOL/L (ref 98–107)
CO2 SERPL-SCNC: 29 MMOL/L (ref 20–31)
CREAT SERPL-MCNC: 0.8 MG/DL (ref 0.7–1.2)
EOSINOPHIL # BLD: 0.26 K/UL (ref 0–0.4)
EOSINOPHILS RELATIVE PERCENT: 1 % (ref 1–4)
ERYTHROCYTE [DISTWIDTH] IN BLOOD BY AUTOMATED COUNT: 13.1 % (ref 11.8–14.4)
GFR SERPL CREATININE-BSD FRML MDRD: >90 ML/MIN/1.73M2
GLUCOSE SERPL-MCNC: 101 MG/DL (ref 70–99)
HCT VFR BLD AUTO: 42.7 % (ref 40.7–50.3)
HGB BLD-MCNC: 13.5 G/DL (ref 13–17)
IMM GRANULOCYTES # BLD AUTO: 0 K/UL (ref 0–0.3)
IMM GRANULOCYTES NFR BLD: 0 %
LDH SERPL-CCNC: 164 U/L (ref 135–225)
LYMPHOCYTES NFR BLD: 21.93 K/UL (ref 1–4.8)
LYMPHOCYTES RELATIVE PERCENT: 84 % (ref 24–44)
MCH RBC QN AUTO: 31 PG (ref 25.2–33.5)
MCHC RBC AUTO-ENTMCNC: 31.6 G/DL (ref 28.4–34.8)
MCV RBC AUTO: 98.2 FL (ref 82.6–102.9)
MONOCYTES NFR BLD: 0.78 K/UL (ref 0.2–0.8)
MONOCYTES NFR BLD: 3 % (ref 1–7)
NEUTROPHILS NFR BLD: 12 % (ref 36–66)
NEUTS SEG NFR BLD: 3.13 K/UL (ref 1.8–7.7)
NRBC BLD-RTO: 0 PER 100 WBC
PLATELET # BLD AUTO: 108 K/UL (ref 138–453)
PMV BLD AUTO: 10.3 FL (ref 8.1–13.5)
POTASSIUM SERPL-SCNC: 4.6 MMOL/L (ref 3.7–5.3)
PROT SERPL-MCNC: 6.2 G/DL (ref 6.4–8.3)
RBC # BLD AUTO: 4.35 M/UL (ref 4.21–5.77)
SODIUM SERPL-SCNC: 138 MMOL/L (ref 135–144)
WBC OTHER # BLD: 26.1 K/UL (ref 3.5–11.3)

## 2024-04-19 PROCEDURE — 3078F DIAST BP <80 MM HG: CPT | Performed by: INTERNAL MEDICINE

## 2024-04-19 PROCEDURE — 1036F TOBACCO NON-USER: CPT | Performed by: INTERNAL MEDICINE

## 2024-04-19 PROCEDURE — 83615 LACTATE (LD) (LDH) ENZYME: CPT

## 2024-04-19 PROCEDURE — 3074F SYST BP LT 130 MM HG: CPT | Performed by: INTERNAL MEDICINE

## 2024-04-19 PROCEDURE — 85025 COMPLETE CBC W/AUTO DIFF WBC: CPT

## 2024-04-19 PROCEDURE — G8417 CALC BMI ABV UP PARAM F/U: HCPCS | Performed by: INTERNAL MEDICINE

## 2024-04-19 PROCEDURE — 36415 COLL VENOUS BLD VENIPUNCTURE: CPT

## 2024-04-19 PROCEDURE — 99211 OFF/OP EST MAY X REQ PHY/QHP: CPT | Performed by: INTERNAL MEDICINE

## 2024-04-19 PROCEDURE — G8428 CUR MEDS NOT DOCUMENT: HCPCS | Performed by: INTERNAL MEDICINE

## 2024-04-19 PROCEDURE — 3017F COLORECTAL CA SCREEN DOC REV: CPT | Performed by: INTERNAL MEDICINE

## 2024-04-19 PROCEDURE — 99214 OFFICE O/P EST MOD 30 MIN: CPT | Performed by: INTERNAL MEDICINE

## 2024-04-19 PROCEDURE — 1123F ACP DISCUSS/DSCN MKR DOCD: CPT | Performed by: INTERNAL MEDICINE

## 2024-04-19 PROCEDURE — 80053 COMPREHEN METABOLIC PANEL: CPT

## 2024-04-19 NOTE — PROGRESS NOTES
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 male who is referred to us for management of CLL. He has history of CLL with diagnosis established 10/2020 via left cervical lymph node biopsy. He briefly followed with oncology, no progression and released. He feels well overall with no symptoms.   He recently had skin cancer removed on the left side of his face, squamous cell carcinoma, 07/2022 with small background CLL subcutaneous tissue.   He has history of cardiac arrest and hypertension, managed with medication. He has history of alcohol abuse, currently consumes 1-2 beers daily. He has history of smoking addiction and quit 03/01/2020.   INTERIM HISTORY  Patient comes in today for follow-up, he feels well and has no complaints.  No fever chills night sweats or weight loss.      PAST MEDICAL HISTORY: has a past medical history of Arthritis, Back pain, CAD (coronary artery disease), Carotid artery stenosis, CLL (chronic lymphocytic leukemia) (HCC), Colon polyps, COPD (chronic obstructive pulmonary disease) (HCC), GERD (gastroesophageal reflux disease), History of heart artery stent, Hyperlipidemia, Hypertension, Ischemic cardiomyopathy, Left heart failure (HCC), Lymphoma (HCC), MI (myocardial infarction) (HCC), Wears dentures, and Wears eyeglasses.    PAST SURGICAL HISTORY: has a past surgical history that includes Lumbar disc surgery; Hydrocele surgery (Left); arthrotomy; lymph node dissection; Finger amputation (Left); Rectal surgery; Carotid endarterectomy (Left, 4/16/2014); Colonoscopy; Eye surgery (Bilateral); Coronary angioplasty with stent; Carotid endarterectomy (Left, 10/26/2020); Carotid endarterectomy (Left, 10/26/2020); back surgery; Carotid endarterectomy (Right, 3/10/2021); Toe Surgery (Left); pre-malignant / benign skin lesion excision

## 2024-04-19 NOTE — TELEPHONE ENCOUNTER
DEON HERE FOR MD VISIT  Rv in one year with cbc, cmp [facility]   LAB ORDERS GIVEN TO PT ON EXIT  TO BE DONE 4/2025  MD VISIT 4/2025  AVS PRINTED W/ INSTRUCTIONS AND GIVEN  TO PT ON EXIT

## 2024-05-29 ENCOUNTER — TELEPHONE (OUTPATIENT)
Dept: FAMILY MEDICINE CLINIC | Age: 74
End: 2024-05-29

## 2024-05-31 DIAGNOSIS — E03.8 SUBCLINICAL HYPOTHYROIDISM: ICD-10-CM

## 2024-05-31 RX ORDER — LEVOTHYROXINE SODIUM 0.03 MG/1
25 TABLET ORAL DAILY
Qty: 90 TABLET | Refills: 0 | Status: SHIPPED | OUTPATIENT
Start: 2024-05-31

## 2024-05-31 NOTE — TELEPHONE ENCOUNTER
Mina Goldberg is calling to request a refill on the following medication(s):    Medication Request:  Requested Prescriptions     Pending Prescriptions Disp Refills    levothyroxine (SYNTHROID) 25 MCG tablet 90 tablet 0     Sig: Take 1 tablet by mouth daily       Last Visit Date (If Applicable):  1/12/2024    Next Visit Date:    7/12/2024

## 2024-06-10 RX ORDER — SACUBITRIL AND VALSARTAN 24; 26 MG/1; MG/1
1 TABLET, FILM COATED ORAL 2 TIMES DAILY
Qty: 60 TABLET | Status: CANCELLED | OUTPATIENT
Start: 2024-06-10

## 2024-06-10 NOTE — TELEPHONE ENCOUNTER
Patient calling in to get entresto renewed, stated it need to go to patient assistance pharmacy  called [facility], patient stated he will be out of this medication this week.      Mina Goldberg is calling to request a refill on the following medication(s):    Medication Request:  Requested Prescriptions     Pending Prescriptions Disp Refills    sacubitril-valsartan (ENTRESTO) 24-26 MG per tablet 60 tablet      Sig: Take 1 tablet by mouth 2 times daily       Last Visit Date (If Applicable):  1/12/2024    Next Visit Date:    7/12/2024

## 2024-07-12 ENCOUNTER — OFFICE VISIT (OUTPATIENT)
Dept: FAMILY MEDICINE CLINIC | Age: 74
End: 2024-07-12
Payer: MEDICARE

## 2024-07-12 VITALS
SYSTOLIC BLOOD PRESSURE: 138 MMHG | OXYGEN SATURATION: 97 % | HEIGHT: 71 IN | BODY MASS INDEX: 27.97 KG/M2 | DIASTOLIC BLOOD PRESSURE: 66 MMHG | TEMPERATURE: 97.2 F | HEART RATE: 51 BPM | WEIGHT: 199.8 LBS

## 2024-07-12 DIAGNOSIS — R73.9 HYPERGLYCEMIA: ICD-10-CM

## 2024-07-12 DIAGNOSIS — D69.6 THROMBOCYTOPENIA, UNSPECIFIED (HCC): ICD-10-CM

## 2024-07-12 DIAGNOSIS — N40.1 BENIGN PROSTATIC HYPERPLASIA WITH URINARY FREQUENCY: ICD-10-CM

## 2024-07-12 DIAGNOSIS — Z85.828 H/O SQUAMOUS CELL CARCINOMA OF SKIN: ICD-10-CM

## 2024-07-12 DIAGNOSIS — Z12.5 PROSTATE CANCER SCREENING: ICD-10-CM

## 2024-07-12 DIAGNOSIS — R35.0 BENIGN PROSTATIC HYPERPLASIA WITH URINARY FREQUENCY: ICD-10-CM

## 2024-07-12 DIAGNOSIS — D22.9 ATYPICAL NEVI: ICD-10-CM

## 2024-07-12 DIAGNOSIS — C91.10 CLL (CHRONIC LYMPHOCYTIC LEUKEMIA) (HCC): ICD-10-CM

## 2024-07-12 DIAGNOSIS — Z00.00 MEDICARE ANNUAL WELLNESS VISIT, SUBSEQUENT: Primary | ICD-10-CM

## 2024-07-12 DIAGNOSIS — I10 ESSENTIAL HYPERTENSION: ICD-10-CM

## 2024-07-12 DIAGNOSIS — I50.22 CHRONIC SYSTOLIC HEART FAILURE (HCC): ICD-10-CM

## 2024-07-12 DIAGNOSIS — I25.10 CORONARY ARTERY DISEASE INVOLVING NATIVE CORONARY ARTERY OF NATIVE HEART WITHOUT ANGINA PECTORIS: ICD-10-CM

## 2024-07-12 DIAGNOSIS — I42.9 CARDIOMYOPATHY, UNSPECIFIED TYPE (HCC): ICD-10-CM

## 2024-07-12 DIAGNOSIS — I77.9 BILATERAL CAROTID ARTERY DISEASE, UNSPECIFIED TYPE (HCC): ICD-10-CM

## 2024-07-12 DIAGNOSIS — E78.5 HYPERLIPIDEMIA, UNSPECIFIED HYPERLIPIDEMIA TYPE: ICD-10-CM

## 2024-07-12 DIAGNOSIS — Z12.11 COLON CANCER SCREENING: ICD-10-CM

## 2024-07-12 PROCEDURE — G8427 DOCREV CUR MEDS BY ELIG CLIN: HCPCS

## 2024-07-12 PROCEDURE — 3017F COLORECTAL CA SCREEN DOC REV: CPT

## 2024-07-12 PROCEDURE — G0439 PPPS, SUBSEQ VISIT: HCPCS

## 2024-07-12 PROCEDURE — 1123F ACP DISCUSS/DSCN MKR DOCD: CPT

## 2024-07-12 PROCEDURE — 99214 OFFICE O/P EST MOD 30 MIN: CPT

## 2024-07-12 PROCEDURE — 3078F DIAST BP <80 MM HG: CPT

## 2024-07-12 PROCEDURE — G8417 CALC BMI ABV UP PARAM F/U: HCPCS

## 2024-07-12 PROCEDURE — 1036F TOBACCO NON-USER: CPT

## 2024-07-12 PROCEDURE — 3075F SYST BP GE 130 - 139MM HG: CPT

## 2024-07-12 ASSESSMENT — LIFESTYLE VARIABLES
HOW OFTEN DURING THE LAST YEAR HAVE YOU FOUND THAT YOU WERE NOT ABLE TO STOP DRINKING ONCE YOU HAD STARTED: NEVER
HAVE YOU OR SOMEONE ELSE BEEN INJURED AS A RESULT OF YOUR DRINKING: NO
HOW MANY STANDARD DRINKS CONTAINING ALCOHOL DO YOU HAVE ON A TYPICAL DAY: 3 OR 4
HOW OFTEN DURING THE LAST YEAR HAVE YOU FAILED TO DO WHAT WAS NORMALLY EXPECTED FROM YOU BECAUSE OF DRINKING: NEVER
HOW OFTEN DURING THE LAST YEAR HAVE YOU HAD A FEELING OF GUILT OR REMORSE AFTER DRINKING: NEVER
HOW OFTEN DURING THE LAST YEAR HAVE YOU NEEDED AN ALCOHOLIC DRINK FIRST THING IN THE MORNING TO GET YOURSELF GOING AFTER A NIGHT OF HEAVY DRINKING: NEVER
HAS A RELATIVE, FRIEND, DOCTOR, OR ANOTHER HEALTH PROFESSIONAL EXPRESSED CONCERN ABOUT YOUR DRINKING OR SUGGESTED YOU CUT DOWN: NO
HOW OFTEN DO YOU HAVE A DRINK CONTAINING ALCOHOL: 4 OR MORE TIMES A WEEK
HOW OFTEN DURING THE LAST YEAR HAVE YOU BEEN UNABLE TO REMEMBER WHAT HAPPENED THE NIGHT BEFORE BECAUSE YOU HAD BEEN DRINKING: NEVER

## 2024-07-12 ASSESSMENT — PATIENT HEALTH QUESTIONNAIRE - PHQ9: DEPRESSION UNABLE TO ASSESS: PT REFUSES

## 2024-07-12 NOTE — PATIENT INSTRUCTIONS
time your wishes change.  Living will.  This form tells your family and your doctor your wishes about life support and other treatment. The form is also called a declaration.  Medical power of .  This form lets you name a person to make treatment decisions for you when you can't speak for yourself. This person is called a health care agent (health care proxy, health care surrogate). The form is also called a durable power of  for health care.  If you do not have an advance directive, decisions about your medical care may be made by a family member, or by a doctor or a  who doesn't know you.  It may help to think of an advance directive as a gift to the people who care for you. If you have one, they won't have to make tough decisions by themselves.  For more information, including forms for your state, see the [employer] website (www.caringinfo.org/planning/advance-directives/).  Follow-up care is a key part of your treatment and safety. Be sure to make and go to all appointments, and call your doctor if you are having problems. It's also a good idea to know your test results and keep a list of the medicines you take.  What should you include in an advance directive?  Many states have a unique advance directive form. (It may ask you to address specific issues.) Or you might use a universal form that's approved by many states.  If your form doesn't tell you what to address, it may be hard to know what to include in your advance directive. Use the questions below to help you get started.  Who do you want to make decisions about your medical care if you are not able to?  What life-support measures do you want if you have a serious illness that gets worse over time or can't be cured?  What are you most afraid of that might happen? (Maybe you're afraid of having pain, losing your independence, or being kept alive by machines.)  Where would you prefer to die? (Your home? A hospital? A nursing

## 2024-07-12 NOTE — PROGRESS NOTES
Medicare Annual Wellness Visit    Mina Goldberg is here for Medicare AWV and Follow-up Chronic Condition    Assessment & Plan   Medicare annual wellness visit, subsequent    Chronic systolic heart failure (HCC)  Bilateral carotid artery disease, unspecified type (HCC)  Cardiomyopathy, unspecified type (HCC)  Coronary artery disease involving native coronary artery of native heart without angina pectoris  Essential hypertension  -all stable, following w/ cardiology q6 months    -     CBC with Auto Differential; Future  -     Comprehensive Metabolic Panel; Future    Thrombocytopenia, unspecified (HCC)  CLL (chronic lymphocytic leukemia) (HCC)  -stable, following w/ heme annually    Benign prostatic hyperplasia with urinary frequency  -seen by urology, Flomax initiated  -pt hasn't noticed much difference but is continuing    Hyperglycemia  -     Hemoglobin A1C; Future    Hyperlipidemia, unspecified hyperlipidemia type  -     TSH with Reflex; Future  -     Lipid, Fasting; Future    H/O squamous cell carcinoma of skin  Atypical nevi  -concerning lesion to left neck  -SCC removed from left face 2 years ago by plastics    -     Allan Evangelista PA, Dermatology, [place]    Prostate cancer screening  -     PSA Screening; Future    Colon cancer screening  -     Cologuard (Fecal DNA Colorectal Cancer Screening)    Recommendations for Preventive Services Due: see orders and patient instructions/AVS.  Recommended screening schedule for the next 5-10 years is provided to the patient in written form: see Patient Instructions/AVS.     Return in about 6 months (around 1/12/2025), or if symptoms worsen or fail to improve, for chronic conditions.     Subjective   The following acute and/or chronic problems were also addressed today:    See above    Patient's complete Health Risk Assessment and screening values have been reviewed and are found in Flowsheets. The following problems were reviewed today and where indicated follow

## 2024-07-15 ENCOUNTER — TELEPHONE (OUTPATIENT)
Dept: FAMILY MEDICINE CLINIC | Age: 74
End: 2024-07-15

## 2024-07-15 LAB
ALBUMIN: 4.1 G/DL
ALK PHOSPHATASE: 71 U/L
ALT SERPL-CCNC: 19 U/L
AST SERPL-CCNC: 25 U/L
BASOPHILS ABSOLUTE: 0.07 X10^3UL
BASOPHILS RELATIVE PERCENT: 0.2 %
BILIRUB SERPL-MCNC: 0.7 MG/DL
BUN BLDV-MCNC: 19 MG/DL
CALCIUM SERPL-MCNC: 9.1 MG/DL
CHLORIDE BLD-SCNC: 109 MMOL/L
CHOLESTEROL, TOTAL: 130 MG/DL
CHOLESTEROL/HDL RATIO: 3
CO2: 26 MMOL/L
CREAT SERPL-MCNC: 0.95 MG/DL
EOSINOPHILS ABSOLUTE: 0.1 X10^3UL
EOSINOPHILS RELATIVE PERCENT: 0.3 %
ERYTHROCYTE [DISTWIDTH] IN BLOOD BY AUTOMATED COUNT: 45.7 FL
ESTIMATED AVERAGE GLUCOSE: 123 MG/DL
FREE T4 REFLEX: YES
GFR AFRICAN AMERICAN: 94 ML/M1.7
GFR NON-AFRICAN AMERICAN: 77.7 ML/M1.7
GLUCOSE: 104 MG/DL
HBA1C MFR BLD: 5.9 %
HCT VFR BLD CALC: 43.1 %
HDLC SERPL-MCNC: 43 MG/DL
HEMOGLOBIN: 13.8 G/DL
IMMATURE GRANULOCYTES %: 0.2 %
IMMATURE GRANULOCYTES ABSOLUTE: 0.07 X10^3UL
LDL CHOLESTEROL: 67 MG/DL
LYMPHOCYTES ABSOLUTE: 31.25 X10^3UL
LYMPHOCYTES RELATIVE PERCENT: 91.3 %
MCH RBC QN AUTO: 31.6 PG
MCHC RBC AUTO-ENTMCNC: 32 G/DL
MCV RBC AUTO: 98.6 FL
MONOCYTES ABSOLUTE: 0.51 X10^3UL
MONOCYTES RELATIVE PERCENT: 1.5 %
NEUTROPHILS ABSOLUTE: 2.22 X10^3UL
NEUTROPHILS RELATIVE PERCENT: 6.5 %
PLATELET # BLD: 77 X10^3UL
POTASSIUM SERPL-SCNC: 4.1 MMOL/L
PROSTATE SPECIFIC ANTIGEN: 0.69 NG/ML
RBC # BLD: 4.37 X10^6UL
SMUDGE CELLS: ABNORMAL
SODIUM BLD-SCNC: 142 MMOL/L
T4 FREE: 1.11 UG/DL
TOTAL PROTEIN: 5.9 G/DL
TRIGL SERPL-MCNC: 101 MG/DL
TSH SERPL DL<=0.05 MIU/L-ACNC: 6.66 MIU/L
VLDLC SERPL CALC-MCNC: 20 MG/DL
WBC # BLD: 34.23 X10^3UL

## 2024-07-17 DIAGNOSIS — E03.8 SUBCLINICAL HYPOTHYROIDISM: ICD-10-CM

## 2024-07-17 RX ORDER — LEVOTHYROXINE SODIUM 0.05 MG/1
50 TABLET ORAL DAILY
Qty: 90 TABLET | Refills: 0 | Status: SHIPPED | OUTPATIENT
Start: 2024-07-17

## 2024-07-31 LAB — NONINV COLON CA DNA+OCC BLD SCRN STL QL: NEGATIVE

## 2024-08-01 RX ORDER — CLOPIDOGREL BISULFATE 75 MG/1
75 TABLET ORAL DAILY
Qty: 90 TABLET | Refills: 3 | OUTPATIENT
Start: 2024-08-01

## 2024-08-01 NOTE — TELEPHONE ENCOUNTER
Mina Goldberg is calling to request a refill on the following medication(s):    Medication Request:  Requested Prescriptions     Pending Prescriptions Disp Refills    spironolactone (ALDACTONE) 25 MG tablet [Pharmacy Med Name: SPIRONOLACTONE  25MG TAB] 90 tablet 1     Sig: Take 1 tablet by mouth daily for 90 doses       Last Visit Date (If Applicable):  7/12/2024    Next Visit Date:    1/6/2025

## 2024-08-02 RX ORDER — SPIRONOLACTONE 25 MG/1
25 TABLET ORAL DAILY
Qty: 90 TABLET | Refills: 0 | Status: SHIPPED | OUTPATIENT
Start: 2024-08-02 | End: 2024-10-31

## 2024-08-06 ENCOUNTER — TELEPHONE (OUTPATIENT)
Dept: FAMILY MEDICINE CLINIC | Age: 74
End: 2024-08-06

## 2024-08-06 NOTE — TELEPHONE ENCOUNTER
We have discussed this multiple times. Levothyroxine is for his thyroid because based on his blood work in the past his thyroid is under functioning and he needs to take the medication to help stimulate it. If he doesn't then this puts him at risk for worsening low heart rate, fatigue, constipation, etc. His cardiologist has not and would not manage this medication, this only comes from primary care or an endocrinologist which he does not see. His last cardiology note states that this is managed per PCP. He should take the newest dose that was sent in.

## 2024-08-06 NOTE — TELEPHONE ENCOUNTER
Patient called in saying that he has been taking Levothyroxine for a while. Patient was fusturated and upset because he swears this is the same medication his cardiologist has put him one, and he's taking double. I told him I will look at all his past medication and see, I did not see him on this medication by any other provider. Patient started this medication on 5/31/24. I told him this information and he swears that its the same medication and that's why its not working because he's been on it too long. I told patient I will get a message back to his provider to get an answer on this. Patient then stated that he will be waiting for a reply today and that is a priority. I told patient Providers have 48 hours to respond to these messages, and patient stated that this is not right and we don't take health care as a priority because he will not touch his med until this is resolved then hung up.       Please advise

## 2024-09-06 ENCOUNTER — TELEPHONE (OUTPATIENT)
Dept: DERMATOLOGY | Age: 74
End: 2024-09-06

## 2024-09-12 ENCOUNTER — TELEPHONE (OUTPATIENT)
Dept: ONCOLOGY | Age: 74
End: 2024-09-12

## 2024-09-20 ENCOUNTER — LAB (OUTPATIENT)
Dept: LAB | Age: 74
End: 2024-09-20

## 2024-09-20 ENCOUNTER — OFFICE VISIT (OUTPATIENT)
Dept: DERMATOLOGY | Age: 74
End: 2024-09-20

## 2024-09-20 VITALS
SYSTOLIC BLOOD PRESSURE: 122 MMHG | DIASTOLIC BLOOD PRESSURE: 70 MMHG | HEART RATE: 52 BPM | TEMPERATURE: 97.3 F | HEIGHT: 71 IN | OXYGEN SATURATION: 96 % | BODY MASS INDEX: 27.3 KG/M2 | WEIGHT: 195 LBS

## 2024-09-20 DIAGNOSIS — D48.9 NEOPLASM OF UNCERTAIN BEHAVIOR: Primary | ICD-10-CM

## 2024-09-20 DIAGNOSIS — Z85.828 HISTORY OF SCC (SQUAMOUS CELL CARCINOMA) OF SKIN: ICD-10-CM

## 2024-09-20 DIAGNOSIS — L82.1 SEBORRHEIC KERATOSES: ICD-10-CM

## 2024-09-20 RX ORDER — LIDOCAINE HYDROCHLORIDE AND EPINEPHRINE 10; 10 MG/ML; UG/ML
1 INJECTION, SOLUTION INFILTRATION; PERINEURAL ONCE
Status: COMPLETED | OUTPATIENT
Start: 2024-09-20 | End: 2024-09-20

## 2024-09-20 RX ADMIN — LIDOCAINE HYDROCHLORIDE AND EPINEPHRINE 1 ML: 10; 10 INJECTION, SOLUTION INFILTRATION; PERINEURAL at 14:42

## 2024-09-27 DIAGNOSIS — E03.8 SUBCLINICAL HYPOTHYROIDISM: ICD-10-CM

## 2024-09-27 RX ORDER — LEVOTHYROXINE SODIUM 50 UG/1
50 TABLET ORAL DAILY
Qty: 90 TABLET | Refills: 1 | Status: SHIPPED | OUTPATIENT
Start: 2024-09-27

## 2024-10-04 ENCOUNTER — TELEPHONE (OUTPATIENT)
Dept: DERMATOLOGY | Age: 74
End: 2024-10-04

## 2024-10-04 NOTE — TELEPHONE ENCOUNTER
Spoke to patient and advised we wouldn't be able to do that due to the type of surgery he had done, they are going to need to see him to make sure there are no healing issues/concerns. He stated understanding

## 2024-10-04 NOTE — TELEPHONE ENCOUNTER
Patient called requesting to come into the office in 2 weeks to have two sutures removed from his neck. He had a ols procedure yesterday in [place] and would rather not drive back out to that location.     Please return his call to discuss.    Thank you.

## 2024-11-04 NOTE — TELEPHONE ENCOUNTER
Patient was a [facility] patient and has appointment with Dr Her In January.      Mina Goldberg is calling to request a refill on the following medication(s):    Medication Request:  Requested Prescriptions     Pending Prescriptions Disp Refills    omeprazole (PRILOSEC) 20 MG delayed release capsule 90 capsule      Sig: Take 1 capsule by mouth Daily       Last Visit Date (If Applicable):  7/12/2024    Next Visit Date:    Visit date not found      Prescription pending.

## 2025-01-06 ENCOUNTER — OFFICE VISIT (OUTPATIENT)
Dept: FAMILY MEDICINE CLINIC | Age: 75
End: 2025-01-06
Payer: MEDICARE

## 2025-01-06 VITALS
BODY MASS INDEX: 28.61 KG/M2 | SYSTOLIC BLOOD PRESSURE: 132 MMHG | OXYGEN SATURATION: 95 % | WEIGHT: 199.4 LBS | HEART RATE: 61 BPM | TEMPERATURE: 97.9 F | DIASTOLIC BLOOD PRESSURE: 60 MMHG

## 2025-01-06 DIAGNOSIS — I10 ESSENTIAL HYPERTENSION: ICD-10-CM

## 2025-01-06 DIAGNOSIS — E03.8 SUBCLINICAL HYPOTHYROIDISM: ICD-10-CM

## 2025-01-06 DIAGNOSIS — C91.10 CLL (CHRONIC LYMPHOCYTIC LEUKEMIA) (HCC): ICD-10-CM

## 2025-01-06 DIAGNOSIS — R09.81 NASAL CONGESTION: Primary | ICD-10-CM

## 2025-01-06 DIAGNOSIS — I25.10 CORONARY ARTERY DISEASE INVOLVING NATIVE CORONARY ARTERY OF NATIVE HEART WITHOUT ANGINA PECTORIS: ICD-10-CM

## 2025-01-06 DIAGNOSIS — Z85.828 H/O SQUAMOUS CELL CARCINOMA OF SKIN: ICD-10-CM

## 2025-01-06 DIAGNOSIS — R73.03 PREDIABETES: ICD-10-CM

## 2025-01-06 DIAGNOSIS — I50.22 CHRONIC SYSTOLIC HEART FAILURE (HCC): ICD-10-CM

## 2025-01-06 DIAGNOSIS — E78.5 HYPERLIPIDEMIA, UNSPECIFIED HYPERLIPIDEMIA TYPE: ICD-10-CM

## 2025-01-06 DIAGNOSIS — N40.0 BENIGN PROSTATIC HYPERPLASIA WITHOUT LOWER URINARY TRACT SYMPTOMS: ICD-10-CM

## 2025-01-06 DIAGNOSIS — K21.9 GASTROESOPHAGEAL REFLUX DISEASE WITHOUT ESOPHAGITIS: Chronic | ICD-10-CM

## 2025-01-06 PROBLEM — J35.1 TONSILLAR ENLARGEMENT: Status: ACTIVE | Noted: 2025-01-06

## 2025-01-06 PROCEDURE — 1123F ACP DISCUSS/DSCN MKR DOCD: CPT

## 2025-01-06 PROCEDURE — 99215 OFFICE O/P EST HI 40 MIN: CPT

## 2025-01-06 PROCEDURE — 3075F SYST BP GE 130 - 139MM HG: CPT

## 2025-01-06 PROCEDURE — G8427 DOCREV CUR MEDS BY ELIG CLIN: HCPCS

## 2025-01-06 PROCEDURE — 1036F TOBACCO NON-USER: CPT

## 2025-01-06 PROCEDURE — 3017F COLORECTAL CA SCREEN DOC REV: CPT

## 2025-01-06 PROCEDURE — 3078F DIAST BP <80 MM HG: CPT

## 2025-01-06 PROCEDURE — G8417 CALC BMI ABV UP PARAM F/U: HCPCS

## 2025-01-06 PROCEDURE — 1159F MED LIST DOCD IN RCRD: CPT

## 2025-01-06 RX ORDER — LORATADINE 10 MG/1
10 TABLET ORAL NIGHTLY
Qty: 30 TABLET | Refills: 0 | Status: SHIPPED | OUTPATIENT
Start: 2025-01-06 | End: 2025-02-05

## 2025-01-06 RX ORDER — FLUTICASONE PROPIONATE 50 MCG
2 SPRAY, SUSPENSION (ML) NASAL DAILY
Qty: 16 G | Refills: 0 | Status: SHIPPED | OUTPATIENT
Start: 2025-01-06

## 2025-01-06 RX ORDER — LEVOTHYROXINE SODIUM 50 UG/1
50 TABLET ORAL DAILY
Qty: 90 TABLET | Refills: 1 | Status: SHIPPED | OUTPATIENT
Start: 2025-01-06

## 2025-01-06 ASSESSMENT — PATIENT HEALTH QUESTIONNAIRE - PHQ9
SUM OF ALL RESPONSES TO PHQ QUESTIONS 1-9: 0
2. FEELING DOWN, DEPRESSED OR HOPELESS: NOT AT ALL
SUM OF ALL RESPONSES TO PHQ9 QUESTIONS 1 & 2: 0
1. LITTLE INTEREST OR PLEASURE IN DOING THINGS: NOT AT ALL

## 2025-01-06 ASSESSMENT — ENCOUNTER SYMPTOMS
CONSTIPATION: 0
FACIAL SWELLING: 0
SHORTNESS OF BREATH: 0
RHINORRHEA: 0
WHEEZING: 0
SINUS PRESSURE: 0
VOICE CHANGE: 0
TROUBLE SWALLOWING: 0
ABDOMINAL PAIN: 0
DIARRHEA: 0
SORE THROAT: 0
NAUSEA: 0
VOMITING: 0
SINUS PAIN: 0

## 2025-01-06 NOTE — ASSESSMENT & PLAN NOTE
Chronic, at goal (stable), He will continue his current medication regimen, including Farxiga and Entresto. He is scheduled to see his cardiologist in April.

## 2025-01-06 NOTE — ASSESSMENT & PLAN NOTE
Chronic, at goal (stable), His A1c was 5.9 in July 2024, indicating borderline blood glucose levels. He will continue his current lifestyle modifications, including reducing sugar intake.

## 2025-01-06 NOTE — ASSESSMENT & PLAN NOTE
Chronic, at goal (stable), He has a history of leukemia but is not currently receiving treatment. He follows up with a specialist once a year.

## 2025-01-06 NOTE — ASSESSMENT & PLAN NOTE
Chronic, at goal (stable), seen before by specialist, He has discontinued Flomax (tamsulosin) due to side effects and is not currently experiencing any issues with urination.

## 2025-01-06 NOTE — PROGRESS NOTES
Medications Discontinued During This Encounter   Medication Reason    tamsulosin (FLOMAX) 0.4 MG capsule LIST CLEANUP    levothyroxine (SYNTHROID) 50 MCG tablet REORDER       Mina received counseling on the following healthy behaviors: nutrition, exercise and medication adherence    Discussed use,benefit, and side effects of prescribed medications.  Barriers to medication compliance addressed.     Discussed with patient signs and symptoms that necessitate emergent medical attention going to ER.     All patient questions answered.  Pt voiced understanding.     Return in about 3 weeks (around 1/27/2025) for To recheck tonsills.      Disclaimer: Some orall of this note was transcribed using voice-recognition software.This may cause typographical errors occasionally. Although all effort is made to fix these errors, please do not hesitate to contact our office if there isany concern with the understanding of this note.      Electronically signed by ALONSO OLSON MD on 1/6/2025 at 11:16 AM

## 2025-01-06 NOTE — ASSESSMENT & PLAN NOTE
New, uncertain prognosis, The nasal congestion is likely due to allergies, as evidenced by the enlarged tonsils and inflamed nasal passages. A regimen of Flonase nasal spray and Zyrtec 10 mg tablets has been prescribed for a duration of 2 weeks. If symptoms persist, an additional allergy medication may be introduced to reduce the size of the tonsils. He has been advised to take Claritin 10 mg at night and to halve the dose if drowsiness occurs., medication adherence emphasized, and lifestyle modifications recommended    Orders:    fluticasone (FLONASE) 50 MCG/ACT nasal spray; 2 sprays by Each Nostril route daily    loratadine (CLARITIN) 10 MG tablet; Take 1 tablet by mouth at bedtime

## 2025-01-06 NOTE — ASSESSMENT & PLAN NOTE
Chronic, at goal (stable), continue current treatment plan, medication adherence emphasized, and lifestyle modifications recommended    Orders:    levothyroxine (SYNTHROID) 50 MCG tablet; Take 1 tablet by mouth daily

## 2025-01-06 NOTE — ASSESSMENT & PLAN NOTE
Chronic, at goal (stable), He has been advised to discontinue Prilosec for a few days to assess for any recurrence of heartburn symptoms. If symptoms return, he may resume the medication

## 2025-01-06 NOTE — ASSESSMENT & PLAN NOTE
Chronic, at goal (stable), He will continue his current medication regimen, including Lipitor. His last blood work in July 2024 showed good cholesterol levels

## 2025-01-06 NOTE — ASSESSMENT & PLAN NOTE
Resolved, He has a history of skin cancer and was last seen by a dermatologist in September. He has declined a referral to a different dermatologist.

## 2025-02-05 DIAGNOSIS — R09.81 NASAL CONGESTION: ICD-10-CM

## 2025-02-05 RX ORDER — LORATADINE 10 MG/1
10 TABLET ORAL NIGHTLY
Qty: 30 TABLET | Refills: 0 | Status: SHIPPED | OUTPATIENT
Start: 2025-02-05 | End: 2025-03-07

## 2025-02-05 NOTE — TELEPHONE ENCOUNTER
Mina Goldberg is calling to request a refill on the following medication(s):    Medication Request:  Requested Prescriptions     Pending Prescriptions Disp Refills    loratadine (CLARITIN) 10 MG tablet 30 tablet 0     Sig: Take 1 tablet by mouth at bedtime       Last Visit Date (If Applicable):  1/6/2025    Next Visit Date:    2/17/2025

## 2025-02-17 ENCOUNTER — OFFICE VISIT (OUTPATIENT)
Dept: FAMILY MEDICINE CLINIC | Age: 75
End: 2025-02-17
Payer: MEDICARE

## 2025-02-17 VITALS
HEART RATE: 59 BPM | TEMPERATURE: 97.2 F | BODY MASS INDEX: 28.75 KG/M2 | WEIGHT: 200.4 LBS | DIASTOLIC BLOOD PRESSURE: 70 MMHG | SYSTOLIC BLOOD PRESSURE: 128 MMHG | OXYGEN SATURATION: 97 %

## 2025-02-17 DIAGNOSIS — R09.81 NASAL CONGESTION: ICD-10-CM

## 2025-02-17 DIAGNOSIS — J30.2 SEASONAL ALLERGIES: Primary | ICD-10-CM

## 2025-02-17 DIAGNOSIS — H61.21 IMPACTED CERUMEN OF RIGHT EAR: ICD-10-CM

## 2025-02-17 PROCEDURE — 3074F SYST BP LT 130 MM HG: CPT

## 2025-02-17 PROCEDURE — 1036F TOBACCO NON-USER: CPT

## 2025-02-17 PROCEDURE — 1123F ACP DISCUSS/DSCN MKR DOCD: CPT

## 2025-02-17 PROCEDURE — 3017F COLORECTAL CA SCREEN DOC REV: CPT

## 2025-02-17 PROCEDURE — 3078F DIAST BP <80 MM HG: CPT

## 2025-02-17 PROCEDURE — G8417 CALC BMI ABV UP PARAM F/U: HCPCS

## 2025-02-17 PROCEDURE — 99214 OFFICE O/P EST MOD 30 MIN: CPT

## 2025-02-17 PROCEDURE — G8427 DOCREV CUR MEDS BY ELIG CLIN: HCPCS

## 2025-02-17 PROCEDURE — 1159F MED LIST DOCD IN RCRD: CPT

## 2025-02-17 RX ORDER — FLUTICASONE PROPIONATE 50 MCG
2 SPRAY, SUSPENSION (ML) NASAL DAILY
Qty: 16 G | Refills: 0 | Status: SHIPPED | OUTPATIENT
Start: 2025-02-17

## 2025-02-17 RX ORDER — LORATADINE 10 MG/1
10 TABLET ORAL NIGHTLY
Qty: 30 TABLET | Refills: 0 | Status: SHIPPED | OUTPATIENT
Start: 2025-02-17 | End: 2025-02-17

## 2025-02-17 RX ORDER — ADHESIVE BANDAGE 3/4"
5 BANDAGE TOPICAL 2 TIMES DAILY
Qty: 1 EACH | Refills: 0 | Status: SHIPPED | OUTPATIENT
Start: 2025-02-17 | End: 2025-03-19

## 2025-02-17 RX ORDER — LORATADINE 10 MG/1
10 TABLET ORAL NIGHTLY
Qty: 90 TABLET | Refills: 1 | Status: SHIPPED | OUTPATIENT
Start: 2025-02-17 | End: 2025-08-16

## 2025-02-17 SDOH — ECONOMIC STABILITY: FOOD INSECURITY: WITHIN THE PAST 12 MONTHS, THE FOOD YOU BOUGHT JUST DIDN'T LAST AND YOU DIDN'T HAVE MONEY TO GET MORE.: NEVER TRUE

## 2025-02-17 SDOH — ECONOMIC STABILITY: FOOD INSECURITY: WITHIN THE PAST 12 MONTHS, YOU WORRIED THAT YOUR FOOD WOULD RUN OUT BEFORE YOU GOT MONEY TO BUY MORE.: NEVER TRUE

## 2025-02-17 ASSESSMENT — ENCOUNTER SYMPTOMS
RHINORRHEA: 0
WHEEZING: 0
FACIAL SWELLING: 0
SINUS PAIN: 0
TROUBLE SWALLOWING: 0
ABDOMINAL PAIN: 0
SHORTNESS OF BREATH: 0
DIARRHEA: 0
VOICE CHANGE: 0
SINUS PRESSURE: 0
VOMITING: 0
NAUSEA: 0
CONSTIPATION: 0
SORE THROAT: 0

## 2025-02-17 NOTE — PROGRESS NOTES
after starting Flonase and Claritin  1.)  Address cancer screening and add to the notes   2.)  Order annual labs  3.)  Will need annual wellness visit  4.)  Review notes from specialist hematology cardiology    Orders Placed This Encounter    fluticasone (FLONASE) 50 MCG/ACT nasal spray     Si sprays by Each Nostril route daily     Dispense:  16 g     Refill:  0    DISCONTD: loratadine (CLARITIN) 10 MG tablet     Sig: Take 1 tablet by mouth at bedtime     Dispense:  30 tablet     Refill:  0    carbamide peroxide (EAR DROPS) 6.5 % otic solution     Sig: Place 5 drops into the right ear 2 times daily     Dispense:  1 each     Refill:  0    loratadine (CLARITIN) 10 MG tablet     Sig: Take 1 tablet by mouth at bedtime     Dispense:  90 tablet     Refill:  1        Requested Prescriptions     Signed Prescriptions Disp Refills    fluticasone (FLONASE) 50 MCG/ACT nasal spray 16 g 0     Si sprays by Each Nostril route daily    carbamide peroxide (EAR DROPS) 6.5 % otic solution 1 each 0     Sig: Place 5 drops into the right ear 2 times daily    loratadine (CLARITIN) 10 MG tablet 90 tablet 1     Sig: Take 1 tablet by mouth at bedtime       Medications Discontinued During This Encounter   Medication Reason    fluticasone (FLONASE) 50 MCG/ACT nasal spray REORDER    loratadine (CLARITIN) 10 MG tablet REORDER    loratadine (CLARITIN) 10 MG tablet          Mina received counseling on the following healthy behaviors: nutrition, exercise and medication adherence    Discussed use,benefit, and side effects of prescribed medications.  Barriers to medication compliance addressed.     Discussed with patient signs and symptoms that necessitate emergent medical attention going to ER.     All patient questions answered.  Pt voiced understanding.     Return in about 7 months (around 2025) for Medicare visit .      Disclaimer: Some orall of this note was transcribed using voice-recognition software.This may cause typographical

## 2025-02-18 PROBLEM — R09.81 NASAL CONGESTION: Status: ACTIVE | Noted: 2025-02-18

## 2025-02-18 PROBLEM — H61.21 IMPACTED CERUMEN OF RIGHT EAR: Status: ACTIVE | Noted: 2025-02-18

## 2025-02-18 NOTE — ASSESSMENT & PLAN NOTE
Chronic, at goal (stable), He reports running out of Flonase nasal spray, which had been helping with his symptoms. A refill for Flonase nasal spray will be provided. He is advised to purchase over-the-counter medications from [facility] if it is more cost-effective., medication adherence emphasized, and lifestyle modifications recommended    Orders:    fluticasone (FLONASE) 50 MCG/ACT nasal spray; 2 sprays by Each Nostril route daily    loratadine (CLARITIN) 10 MG tablet; Take 1 tablet by mouth at bedtime

## 2025-02-18 NOTE — ASSESSMENT & PLAN NOTE
New, uncertain prognosis, The right ear is obstructed with cerumen, preventing a clear view of the tympanic membrane. Ear drops will be prescribed to soften the cerumen for easier removal. He is advised to use the drops in the right ear only.    Orders:    carbamide peroxide (EAR DROPS) 6.5 % otic solution; Place 5 drops into the right ear 2 times daily

## 2025-02-18 NOTE — ASSESSMENT & PLAN NOTE
Chronic, at goal (stable), He reports running out of Flonase nasal spray, which had been helping with his symptoms. A refill for Flonase nasal spray will be provided. He is advised to purchase over-the-counter medications from [facility] if it is more cost-effective., medication adherence emphasized, and lifestyle modifications recommended    Orders:    fluticasone (FLONASE) 50 MCG/ACT nasal spray; 2 sprays by Each Nostril route daily    loratadine (CLARITIN) 10 MG tablet; Take 1 tablet by mouth at bedtime

## 2025-02-27 LAB
ALBUMIN: 4.3 G/DL
ALK PHOSPHATASE: 88 U/L
ALT SERPL-CCNC: 59 U/L
AST SERPL-CCNC: 42 U/L
BASOPHILS ABSOLUTE: 0.08 X10^3UL
BASOPHILS RELATIVE PERCENT: 0.3 %
BILIRUB SERPL-MCNC: 1.1 MG/DL
BUN BLDV-MCNC: 24 MG/DL
CALCIUM SERPL-MCNC: 9.7 MG/DL
CHLORIDE BLD-SCNC: 104 MMOL/L
CO2: 31 MMOL/L
CREAT SERPL-MCNC: 0.9 MG/DL
EGFR (CKD-EPI): 90.2 ML/M1.7
EOSINOPHILS ABSOLUTE: 0.13 X10^3UL
EOSINOPHILS RELATIVE PERCENT: 0.5 %
ERYTHROCYTE [DISTWIDTH] IN BLOOD BY AUTOMATED COUNT: 47.2 FL
GLUCOSE: 163 MG/DL
HCT VFR BLD CALC: 44.6 %
HEMOGLOBIN: 13.9 G/DL
IMMATURE GRANULOCYTES %: 0.6 %
IMMATURE GRANULOCYTES ABSOLUTE: 0.16 X10^3UL
LACTATE DEHYDROGENASE: 260 U/L
LYMPHOCYTES ABSOLUTE: 21.94 X10^3UL
LYMPHOCYTES RELATIVE PERCENT: 82.8 %
MCH RBC QN AUTO: 30.5 PG
MCHC RBC AUTO-ENTMCNC: 31.2 G/DL
MCV RBC AUTO: 98 FL
MONOCYTES ABSOLUTE: 0.64 X10^3UL
MONOCYTES RELATIVE PERCENT: 2.4 %
NEUTROPHILS ABSOLUTE: 3.55 X10^3UL
NEUTROPHILS RELATIVE PERCENT: 13.4 %
PLATELET # BLD: 72 X10^3UL
POTASSIUM SERPL-SCNC: 4.5 MMOL/L
RBC # BLD: 4.55 X10^6UL
SODIUM BLD-SCNC: 138 MMOL/L
TOTAL PROTEIN: 6.2 G/DL
WBC # BLD: 26.5 X10^3UL

## 2025-03-12 ENCOUNTER — APPOINTMENT (OUTPATIENT)
Dept: CT IMAGING | Age: 75
End: 2025-03-12
Payer: MEDICARE

## 2025-03-12 ENCOUNTER — TELEPHONE (OUTPATIENT)
Dept: FAMILY MEDICINE CLINIC | Age: 75
End: 2025-03-12

## 2025-03-12 ENCOUNTER — HOSPITAL ENCOUNTER (EMERGENCY)
Age: 75
Discharge: HOME | End: 2025-03-12
Attending: EMERGENCY MEDICINE
Payer: MEDICARE

## 2025-03-12 VITALS
BODY MASS INDEX: 28.63 KG/M2 | DIASTOLIC BLOOD PRESSURE: 58 MMHG | HEART RATE: 59 BPM | TEMPERATURE: 98 F | WEIGHT: 200 LBS | RESPIRATION RATE: 18 BRPM | OXYGEN SATURATION: 94 % | SYSTOLIC BLOOD PRESSURE: 97 MMHG | HEIGHT: 70 IN

## 2025-03-12 DIAGNOSIS — R22.1 NECK SWELLING: Primary | ICD-10-CM

## 2025-03-12 DIAGNOSIS — C85.91 LYMPHOMA OF LYMPH NODES OF NECK, UNSPECIFIED LYMPHOMA TYPE (HCC): ICD-10-CM

## 2025-03-12 LAB
ANION GAP SERPL CALCULATED.3IONS-SCNC: 12 MMOL/L (ref 9–16)
BASOPHILS # BLD: 0 K/UL (ref 0–0.2)
BASOPHILS NFR BLD: 0 %
BUN SERPL-MCNC: 17 MG/DL (ref 8–23)
CALCIUM SERPL-MCNC: 9.3 MG/DL (ref 8.8–10.2)
CHLORIDE SERPL-SCNC: 105 MMOL/L (ref 98–107)
CO2 SERPL-SCNC: 25 MMOL/L (ref 20–31)
CREAT SERPL-MCNC: 1.1 MG/DL (ref 0.7–1.2)
EOSINOPHIL # BLD: 0 K/UL (ref 0–0.4)
EOSINOPHILS RELATIVE PERCENT: 0 % (ref 1–4)
ERYTHROCYTE [DISTWIDTH] IN BLOOD BY AUTOMATED COUNT: 13.2 % (ref 11.8–14.4)
GFR, ESTIMATED: 73 ML/MIN/1.73M2
GLUCOSE SERPL-MCNC: 123 MG/DL (ref 82–115)
HCT VFR BLD AUTO: 42.3 % (ref 40.7–50.3)
HGB BLD-MCNC: 13.7 G/DL (ref 13–17)
IMM GRANULOCYTES # BLD AUTO: 0 K/UL (ref 0–0.3)
IMM GRANULOCYTES NFR BLD: 0 %
LYMPHOCYTES NFR BLD: 23.02 K/UL (ref 1–4.8)
LYMPHOCYTES RELATIVE PERCENT: 84 % (ref 24–44)
MCH RBC QN AUTO: 31.1 PG (ref 25.2–33.5)
MCHC RBC AUTO-ENTMCNC: 32.4 G/DL (ref 28.4–34.8)
MCV RBC AUTO: 96.1 FL (ref 82.6–102.9)
MONOCYTES NFR BLD: 1.37 K/UL (ref 0.2–0.8)
MONOCYTES NFR BLD: 5 % (ref 1–7)
MORPHOLOGY: ABNORMAL
NEUTROPHILS NFR BLD: 11 % (ref 36–66)
NEUTS SEG NFR BLD: 3.01 K/UL (ref 1.8–7.7)
NRBC BLD-RTO: 0.1 PER 100 WBC
NUCLEATED RED BLOOD CELLS: 1 PER 100 WBC
PLATELET # BLD AUTO: 81 K/UL (ref 138–453)
PMV BLD AUTO: 10.3 FL (ref 8.1–13.5)
POTASSIUM SERPL-SCNC: 5 MMOL/L (ref 3.7–5.3)
RBC # BLD AUTO: 4.4 M/UL (ref 4.21–5.77)
SODIUM SERPL-SCNC: 142 MMOL/L (ref 136–145)
SPECIMEN SOURCE: NORMAL
STREP A, MOLECULAR: NEGATIVE
WBC OTHER # BLD: 27.4 K/UL (ref 3.5–11.3)

## 2025-03-12 PROCEDURE — 70491 CT SOFT TISSUE NECK W/DYE: CPT

## 2025-03-12 PROCEDURE — 2500000003 HC RX 250 WO HCPCS: Performed by: PHYSICIAN ASSISTANT

## 2025-03-12 PROCEDURE — 80048 BASIC METABOLIC PNL TOTAL CA: CPT

## 2025-03-12 PROCEDURE — 2580000003 HC RX 258: Performed by: PHYSICIAN ASSISTANT

## 2025-03-12 PROCEDURE — 6360000002 HC RX W HCPCS: Performed by: PHYSICIAN ASSISTANT

## 2025-03-12 PROCEDURE — 6360000004 HC RX CONTRAST MEDICATION: Performed by: PHYSICIAN ASSISTANT

## 2025-03-12 PROCEDURE — 99285 EMERGENCY DEPT VISIT HI MDM: CPT

## 2025-03-12 PROCEDURE — 96374 THER/PROPH/DIAG INJ IV PUSH: CPT

## 2025-03-12 PROCEDURE — 87651 STREP A DNA AMP PROBE: CPT

## 2025-03-12 PROCEDURE — 85025 COMPLETE CBC W/AUTO DIFF WBC: CPT

## 2025-03-12 RX ORDER — 0.9 % SODIUM CHLORIDE 0.9 %
100 INTRAVENOUS SOLUTION INTRAVENOUS ONCE
Status: COMPLETED | OUTPATIENT
Start: 2025-03-12 | End: 2025-03-12

## 2025-03-12 RX ORDER — PREDNISONE 20 MG/1
20 TABLET ORAL 2 TIMES DAILY
Qty: 10 TABLET | Refills: 0 | Status: SHIPPED | OUTPATIENT
Start: 2025-03-12 | End: 2025-03-17

## 2025-03-12 RX ORDER — DEXAMETHASONE SODIUM PHOSPHATE 10 MG/ML
10 INJECTION, SOLUTION INTRAMUSCULAR; INTRAVENOUS ONCE
Status: COMPLETED | OUTPATIENT
Start: 2025-03-12 | End: 2025-03-12

## 2025-03-12 RX ORDER — IOPAMIDOL 755 MG/ML
75 INJECTION, SOLUTION INTRAVASCULAR
Status: COMPLETED | OUTPATIENT
Start: 2025-03-12 | End: 2025-03-12

## 2025-03-12 RX ORDER — SODIUM CHLORIDE 0.9 % (FLUSH) 0.9 %
10 SYRINGE (ML) INJECTION PRN
Status: DISCONTINUED | OUTPATIENT
Start: 2025-03-12 | End: 2025-03-12 | Stop reason: HOSPADM

## 2025-03-12 RX ADMIN — SODIUM CHLORIDE, PRESERVATIVE FREE 10 ML: 5 INJECTION INTRAVENOUS at 18:38

## 2025-03-12 RX ADMIN — DEXAMETHASONE SODIUM PHOSPHATE 10 MG: 10 INJECTION, SOLUTION INTRAMUSCULAR; INTRAVENOUS at 21:53

## 2025-03-12 RX ADMIN — SODIUM CHLORIDE 100 ML: 9 INJECTION, SOLUTION INTRAVENOUS at 18:38

## 2025-03-12 RX ADMIN — IOPAMIDOL 75 ML: 755 INJECTION, SOLUTION INTRAVENOUS at 18:37

## 2025-03-12 NOTE — TELEPHONE ENCOUNTER
Patient calling stating he has swelling on both sides of his neck. Patient advised to go to UC, Walk-in or ED to be evaulated

## 2025-03-13 NOTE — DISCHARGE INSTRUCTIONS
Take meds as prescribed.  Follow up with oncologist tomorrow.  Be sure to tell them oncology was consulted from the ED and you need to be seen tomorrow.  Return to ER immediately if symptoms worsen or persist.

## 2025-03-13 NOTE — ED PROVIDER NOTES
[facility] EMERGENCY DEPARTMENT  eMERGENCY dEPARTMENTeNCOUnter      Pt Name: Mina Goldberg  MRN: [id number]  Birthdate 1950  Date ofevaluation: 3/12/2025  Provider: Clyde Meneses PA-C    CHIEF COMPLAINT       Chief Complaint   Patient presents with    Neck Swelling     Worse since Sunday, noticed by family for the last few wks          HISTORY OF PRESENT ILLNESS  (Location/Symptom, Timing/Onset, Context/Setting, Quality, Duration, Modifying Factors, Severity.)   Mina Goldberg is a 75 y.o. male who presents to the emergency department with neck swelling has been noticed for the last few weeks but more noticeable since Sunday.  Patient denies any trouble eating and drinking breathing or swallowing.  History of lymphoma for years.  Follows with oncology here at [facility].      Nursing Notes were reviewed.    ALLERGIES     Patient has no known allergies.    CURRENT MEDICATIONS       Previous Medications    ATORVASTATIN (LIPITOR) 80 MG TABLET    Take 1 tablet by mouth daily    CARBAMIDE PEROXIDE (EAR DROPS) 6.5 % OTIC SOLUTION    Place 5 drops into the right ear 2 times daily    CLOPIDOGREL (PLAVIX) 75 MG TABLET    Take 1 tablet by mouth daily    DAPAGLIFLOZIN (FARXIGA) 10 MG TABLET    Take 1 tablet by mouth daily    FLUTICASONE (FLONASE) 50 MCG/ACT NASAL SPRAY    2 sprays by Each Nostril route daily    LEVOTHYROXINE (SYNTHROID) 50 MCG TABLET    Take 1 tablet by mouth daily    LORATADINE (CLARITIN) 10 MG TABLET    Take 1 tablet by mouth at bedtime    METOPROLOL SUCCINATE (TOPROL XL) 25 MG EXTENDED RELEASE TABLET    Take 1 tablet by mouth daily    OMEPRAZOLE (PRILOSEC) 20 MG DELAYED RELEASE CAPSULE    Take 1 capsule by mouth Daily    SACUBITRIL-VALSARTAN (ENTRESTO) 24-26 MG PER TABLET    Take 1 tablet by mouth 2 times daily    SPIRONOLACTONE (ALDACTONE) 25 MG TABLET    Take 1 tablet by mouth daily       PAST MEDICAL HISTORY         Diagnosis Date    Arthritis     Spine, left wrist    Back pain

## 2025-03-14 ENCOUNTER — TELEPHONE (OUTPATIENT)
Dept: ONCOLOGY | Age: 75
End: 2025-03-14

## 2025-03-14 ENCOUNTER — OFFICE VISIT (OUTPATIENT)
Dept: ONCOLOGY | Age: 75
End: 2025-03-14
Payer: MEDICARE

## 2025-03-14 VITALS
WEIGHT: 193.2 LBS | BODY MASS INDEX: 27.72 KG/M2 | SYSTOLIC BLOOD PRESSURE: 153 MMHG | DIASTOLIC BLOOD PRESSURE: 61 MMHG | RESPIRATION RATE: 18 BRPM | HEART RATE: 58 BPM | TEMPERATURE: 97.9 F

## 2025-03-14 DIAGNOSIS — C91.10 CLL (CHRONIC LYMPHOCYTIC LEUKEMIA) (HCC): Primary | ICD-10-CM

## 2025-03-14 PROCEDURE — 99214 OFFICE O/P EST MOD 30 MIN: CPT | Performed by: INTERNAL MEDICINE

## 2025-03-14 PROCEDURE — 99211 OFF/OP EST MAY X REQ PHY/QHP: CPT | Performed by: INTERNAL MEDICINE

## 2025-03-14 PROCEDURE — 3017F COLORECTAL CA SCREEN DOC REV: CPT | Performed by: INTERNAL MEDICINE

## 2025-03-14 PROCEDURE — G8428 CUR MEDS NOT DOCUMENT: HCPCS | Performed by: INTERNAL MEDICINE

## 2025-03-14 PROCEDURE — G8417 CALC BMI ABV UP PARAM F/U: HCPCS | Performed by: INTERNAL MEDICINE

## 2025-03-14 PROCEDURE — 1036F TOBACCO NON-USER: CPT | Performed by: INTERNAL MEDICINE

## 2025-03-14 PROCEDURE — 1126F AMNT PAIN NOTED NONE PRSNT: CPT | Performed by: INTERNAL MEDICINE

## 2025-03-14 PROCEDURE — 3078F DIAST BP <80 MM HG: CPT | Performed by: INTERNAL MEDICINE

## 2025-03-14 PROCEDURE — 1123F ACP DISCUSS/DSCN MKR DOCD: CPT | Performed by: INTERNAL MEDICINE

## 2025-03-14 PROCEDURE — 3077F SYST BP >= 140 MM HG: CPT | Performed by: INTERNAL MEDICINE

## 2025-03-14 NOTE — TELEPHONE ENCOUNTER
DEON HERE FOR FOLLOW UP   Start Burkinsa once approved   Rv in 5 weeks with cbc, cmp LDH   NEW ORDER PENDING PRECERT   MD VISIT: 4/18/25 @ 11AM   AVS PRINTED AND GIVEN ON EXIT

## 2025-03-14 NOTE — PROGRESS NOTES
DIAGNOSIS:   FRANCIS SLL/CLL, stage 1, 10/2020  Squamous cell carcinoma, left side of face, 07/2022    CURRENT THERAPY:  Surveillance       BRIEF CASE HISTORY:   Mina Goldberg is a very pleasant 75 y.o. male who is referred to us for management of CLL. He has history of CLL with diagnosis established 10/2020 via left cervical lymph node biopsy. He briefly followed with oncology, no progression and released. He feels well overall with no symptoms.   He recently had skin cancer removed on the left side of his face, squamous cell carcinoma, 07/2022 with small background CLL subcutaneous tissue.   He has history of cardiac arrest and hypertension, managed with medication. He has history of alcohol abuse, currently consumes 1-2 beers daily. He has history of smoking addiction and quit 03/01/2020.   INTERIM HISTORY  Patient comes in today for follow-up, he started having significant swelling in the cervical lymph nodes.  He was seen at the emergency room and given some steroids with improvement.  The lymph node  swelling was not associate with infection or allergy.      PAST MEDICAL HISTORY: has a past medical history of Arthritis, Back pain, CAD (coronary artery disease), Carotid artery stenosis, CLL (chronic lymphocytic leukemia) (HCC), Colon polyps, COPD (chronic obstructive pulmonary disease) (HCC), GERD (gastroesophageal reflux disease), History of heart artery stent, Hyperlipidemia, Hypertension, Ischemic cardiomyopathy, Left heart failure (HCC), Lymphoma (HCC), MI (myocardial infarction) (HCC), Wears dentures, and Wears eyeglasses.    PAST SURGICAL HISTORY: has a past surgical history that includes Lumbar disc surgery; Hydrocele surgery (Left); arthrotomy; lymph node dissection; Finger amputation (Left); Rectal surgery; Carotid endarterectomy (Left, 4/16/2014); Colonoscopy; Eye surgery (Bilateral); Coronary angioplasty with stent; Carotid endarterectomy (Left, 10/26/2020); Carotid endarterectomy (Left,

## 2025-03-24 ENCOUNTER — TELEPHONE (OUTPATIENT)
Dept: FAMILY MEDICINE CLINIC | Age: 75
End: 2025-03-24

## 2025-03-24 RX ORDER — PREDNISONE 20 MG/1
20 TABLET ORAL 2 TIMES DAILY
Qty: 10 TABLET | Refills: 0 | Status: SHIPPED | OUTPATIENT
Start: 2025-03-24 | End: 2025-03-24

## 2025-03-24 NOTE — TELEPHONE ENCOUNTER
Patient notified. Patient is extremely upset and says that he will find a new doctor. Says that his oncologist said to call his pcp for the medication.

## 2025-03-24 NOTE — TELEPHONE ENCOUNTER
I unfortunately cannot order prescription that was managed for oncologist for diagnosis that I never treated

## 2025-03-24 NOTE — TELEPHONE ENCOUNTER
Derick Disla MD , ordered patient a steroid for his lymphoma of his lymph notes and patient call and asked them for another round of steroids and they advised patient to get medication from pcp. Please advise and send in steroid pack to [facility].     predniSONE (DELTASONE) 20 MG tablet

## 2025-03-27 DIAGNOSIS — J30.2 SEASONAL ALLERGIES: ICD-10-CM

## 2025-03-27 DIAGNOSIS — R09.81 NASAL CONGESTION: ICD-10-CM

## 2025-03-27 NOTE — TELEPHONE ENCOUNTER
Mina Goldberg is calling to request a refill on the following medication(s):    Medication Request:  Requested Prescriptions     Pending Prescriptions Disp Refills    fluticasone (FLONASE) 50 MCG/ACT nasal spray [Pharmacy Med Name: Fluticasone Propionate Nasal Suspension 50 MCG/ACT] 16 g 11     Sig: USE 2 SPRAYS IN EACH NOSTRIL EVERY DAY       Last Visit Date (If Applicable):  2/17/2025    Next Visit Date:    9/15/2025

## 2025-03-28 RX ORDER — FLUTICASONE PROPIONATE 50 MCG
2 SPRAY, SUSPENSION (ML) NASAL DAILY
Qty: 16 G | Refills: 11 | Status: SHIPPED | OUTPATIENT
Start: 2025-03-28

## 2025-04-04 DIAGNOSIS — C91.10 CLL (CHRONIC LYMPHOCYTIC LEUKEMIA) (HCC): Primary | ICD-10-CM

## 2025-04-07 ENCOUNTER — TELEPHONE (OUTPATIENT)
Dept: INFUSION THERAPY | Age: 75
End: 2025-04-07

## 2025-04-07 NOTE — TELEPHONE ENCOUNTER
[facility] lab called with critical WBC 50.03. They are faxing results to our office as well. Pt has f/u 4/18. Will route to Dr. Raygoza.

## 2025-04-18 ENCOUNTER — TELEPHONE (OUTPATIENT)
Dept: ONCOLOGY | Age: 75
End: 2025-04-18

## 2025-04-18 ENCOUNTER — OFFICE VISIT (OUTPATIENT)
Dept: ONCOLOGY | Age: 75
End: 2025-04-18
Payer: MEDICARE

## 2025-04-18 VITALS
BODY MASS INDEX: 28.34 KG/M2 | WEIGHT: 197.5 LBS | TEMPERATURE: 97.8 F | RESPIRATION RATE: 18 BRPM | HEART RATE: 57 BPM | SYSTOLIC BLOOD PRESSURE: 112 MMHG | DIASTOLIC BLOOD PRESSURE: 43 MMHG

## 2025-04-18 DIAGNOSIS — D69.6 THROMBOCYTOPENIA, UNSPECIFIED: ICD-10-CM

## 2025-04-18 DIAGNOSIS — C91.10 CLL (CHRONIC LYMPHOCYTIC LEUKEMIA) (HCC): Primary | ICD-10-CM

## 2025-04-18 DIAGNOSIS — R59.0 CERVICAL ADENOPATHY: ICD-10-CM

## 2025-04-18 PROCEDURE — G8417 CALC BMI ABV UP PARAM F/U: HCPCS | Performed by: INTERNAL MEDICINE

## 2025-04-18 PROCEDURE — 99211 OFF/OP EST MAY X REQ PHY/QHP: CPT | Performed by: INTERNAL MEDICINE

## 2025-04-18 PROCEDURE — 3017F COLORECTAL CA SCREEN DOC REV: CPT | Performed by: INTERNAL MEDICINE

## 2025-04-18 PROCEDURE — 3078F DIAST BP <80 MM HG: CPT | Performed by: INTERNAL MEDICINE

## 2025-04-18 PROCEDURE — 3074F SYST BP LT 130 MM HG: CPT | Performed by: INTERNAL MEDICINE

## 2025-04-18 PROCEDURE — 99214 OFFICE O/P EST MOD 30 MIN: CPT | Performed by: INTERNAL MEDICINE

## 2025-04-18 PROCEDURE — 1036F TOBACCO NON-USER: CPT | Performed by: INTERNAL MEDICINE

## 2025-04-18 PROCEDURE — 1159F MED LIST DOCD IN RCRD: CPT | Performed by: INTERNAL MEDICINE

## 2025-04-18 PROCEDURE — 1123F ACP DISCUSS/DSCN MKR DOCD: CPT | Performed by: INTERNAL MEDICINE

## 2025-04-18 PROCEDURE — 1126F AMNT PAIN NOTED NONE PRSNT: CPT | Performed by: INTERNAL MEDICINE

## 2025-04-18 PROCEDURE — G8427 DOCREV CUR MEDS BY ELIG CLIN: HCPCS | Performed by: INTERNAL MEDICINE

## 2025-04-18 RX ORDER — TAMSULOSIN HYDROCHLORIDE 0.4 MG/1
CAPSULE ORAL
COMMUNITY

## 2025-04-18 NOTE — TELEPHONE ENCOUNTER
DEON HERE FOR FOLLOW UP   Rv in 4 weeks with cbc, cmp LDH   MD VISIT: 5/16/25 @ 11AM   LABS PRINTED AND GIVEN ON EXIT   AVS PRINTED AND GIVEN ON EXIT

## 2025-04-18 NOTE — PROGRESS NOTES
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 male who is referred to us for management of CLL. He has history of CLL with diagnosis established 10/2020 via left cervical lymph node biopsy. He briefly followed with oncology, no progression and released. He feels well overall with no symptoms.   He recently had skin cancer removed on the left side of his face, squamous cell carcinoma, 07/2022 with small background CLL subcutaneous tissue.   He has history of cardiac arrest and hypertension, managed with medication. He has history of alcohol abuse, currently consumes 1-2 beers daily. He has history of smoking addiction and quit 03/01/2020.   The patient was followed and he did quite well for many years.  However, in 2025, he presented with progressive cervical adenopathy and progressive thrombocytopenia.  We felt that this is enough progression of his disease to warrant treatment and we will start him on Zanubrutinib.  INTERIM HISTORY  Patient comes in today for follow-up, he has been taking his ibrutinib for about 4 weeks.  He noticed a significant improvement in the cervical adenopathy.  He is tolerating the medicine quite well without any side effects    PAST MEDICAL HISTORY: has a past medical history of Arthritis, Back pain, CAD (coronary artery disease), Carotid artery stenosis, CLL (chronic lymphocytic leukemia) (HCC), Colon polyps, COPD (chronic obstructive pulmonary disease) (HCC), GERD (gastroesophageal reflux disease), History of heart artery stent, Hyperlipidemia, Hypertension, Ischemic cardiomyopathy, Left heart failure (HCC), Lymphoma, MI (myocardial infarction) (HCC), Wears dentures, and Wears eyeglasses.    PAST SURGICAL HISTORY: has a past surgical history that

## 2025-04-29 ENCOUNTER — APPOINTMENT (OUTPATIENT)
Dept: GENERAL RADIOLOGY | Age: 75
End: 2025-04-29
Payer: MEDICARE

## 2025-04-29 ENCOUNTER — HOSPITAL ENCOUNTER (OUTPATIENT)
Age: 75
Setting detail: OBSERVATION
Discharge: HOME | End: 2025-04-30
Attending: EMERGENCY MEDICINE | Admitting: EMERGENCY MEDICINE
Payer: MEDICARE

## 2025-04-29 DIAGNOSIS — I20.9 ANGINA PECTORIS: ICD-10-CM

## 2025-04-29 DIAGNOSIS — I73.9 CLAUDICATION: ICD-10-CM

## 2025-04-29 DIAGNOSIS — I20.89 ANGINA OF EFFORT: Primary | ICD-10-CM

## 2025-04-29 LAB
ANION GAP SERPL CALCULATED.3IONS-SCNC: 12 MMOL/L (ref 9–16)
BNP SERPL-MCNC: 419 PG/ML (ref 0–125)
BUN SERPL-MCNC: 13 MG/DL (ref 8–23)
CALCIUM SERPL-MCNC: 9.5 MG/DL (ref 8.6–10.4)
CHLORIDE SERPL-SCNC: 105 MMOL/L (ref 98–107)
CO2 SERPL-SCNC: 25 MMOL/L (ref 20–31)
CREAT SERPL-MCNC: 1 MG/DL (ref 0.7–1.2)
D DIMER PPP FEU-MCNC: 0.31 UG/ML FEU (ref 0–0.57)
ERYTHROCYTE [DISTWIDTH] IN BLOOD BY AUTOMATED COUNT: 14.5 % (ref 11.8–14.4)
GFR, ESTIMATED: 79 ML/MIN/1.73M2
GLUCOSE SERPL-MCNC: 148 MG/DL (ref 74–99)
HCT VFR BLD AUTO: 39.1 % (ref 40.7–50.3)
HGB BLD-MCNC: 12.3 G/DL (ref 13–17)
MCH RBC QN AUTO: 30.2 PG (ref 25.2–33.5)
MCHC RBC AUTO-ENTMCNC: 31.5 G/DL (ref 28.4–34.8)
MCV RBC AUTO: 96.1 FL (ref 82.6–102.9)
NRBC BLD-RTO: 0 PER 100 WBC
PLATELET # BLD AUTO: ABNORMAL K/UL (ref 138–453)
PLATELET, FLUORESCENCE: 63 K/UL (ref 138–453)
PLATELETS.RETICULATED NFR BLD AUTO: 7 % (ref 1.1–10.3)
POTASSIUM SERPL-SCNC: 4.1 MMOL/L (ref 3.7–5.3)
RBC # BLD AUTO: 4.07 M/UL (ref 4.21–5.77)
SODIUM SERPL-SCNC: 142 MMOL/L (ref 136–145)
TROPONIN I SERPL HS-MCNC: 23 NG/L (ref 0–22)
TROPONIN I SERPL HS-MCNC: 24 NG/L (ref 0–22)
WBC OTHER # BLD: 30.3 K/UL (ref 3.5–11.3)

## 2025-04-29 PROCEDURE — 85055 RETICULATED PLATELET ASSAY: CPT

## 2025-04-29 PROCEDURE — 6370000000 HC RX 637 (ALT 250 FOR IP)

## 2025-04-29 PROCEDURE — 84484 ASSAY OF TROPONIN QUANT: CPT

## 2025-04-29 PROCEDURE — 99285 EMERGENCY DEPT VISIT HI MDM: CPT

## 2025-04-29 PROCEDURE — 85379 FIBRIN DEGRADATION QUANT: CPT

## 2025-04-29 PROCEDURE — G0378 HOSPITAL OBSERVATION PER HR: HCPCS

## 2025-04-29 PROCEDURE — 71046 X-RAY EXAM CHEST 2 VIEWS: CPT

## 2025-04-29 PROCEDURE — 85027 COMPLETE CBC AUTOMATED: CPT

## 2025-04-29 PROCEDURE — 2500000003 HC RX 250 WO HCPCS

## 2025-04-29 PROCEDURE — 80048 BASIC METABOLIC PNL TOTAL CA: CPT

## 2025-04-29 PROCEDURE — 83880 ASSAY OF NATRIURETIC PEPTIDE: CPT

## 2025-04-29 PROCEDURE — 93005 ELECTROCARDIOGRAM TRACING: CPT

## 2025-04-29 RX ORDER — SODIUM CHLORIDE 0.9 % (FLUSH) 0.9 %
5-40 SYRINGE (ML) INJECTION EVERY 12 HOURS SCHEDULED
Status: DISCONTINUED | OUTPATIENT
Start: 2025-04-29 | End: 2025-04-30 | Stop reason: HOSPADM

## 2025-04-29 RX ORDER — ATORVASTATIN CALCIUM 80 MG/1
80 TABLET, FILM COATED ORAL DAILY
Status: DISCONTINUED | OUTPATIENT
Start: 2025-04-29 | End: 2025-04-30 | Stop reason: HOSPADM

## 2025-04-29 RX ORDER — SODIUM CHLORIDE 9 MG/ML
INJECTION, SOLUTION INTRAVENOUS PRN
Status: DISCONTINUED | OUTPATIENT
Start: 2025-04-29 | End: 2025-04-30 | Stop reason: HOSPADM

## 2025-04-29 RX ORDER — ASPIRIN 81 MG/1
81 TABLET ORAL DAILY
Status: DISCONTINUED | OUTPATIENT
Start: 2025-04-30 | End: 2025-04-30 | Stop reason: HOSPADM

## 2025-04-29 RX ORDER — ASPIRIN 81 MG/1
324 TABLET, CHEWABLE ORAL ONCE
Status: COMPLETED | OUTPATIENT
Start: 2025-04-29 | End: 2025-04-29

## 2025-04-29 RX ORDER — ONDANSETRON 2 MG/ML
4 INJECTION INTRAMUSCULAR; INTRAVENOUS EVERY 6 HOURS PRN
Status: DISCONTINUED | OUTPATIENT
Start: 2025-04-29 | End: 2025-04-30 | Stop reason: HOSPADM

## 2025-04-29 RX ORDER — ACETAMINOPHEN 500 MG
1000 TABLET ORAL ONCE
Status: COMPLETED | OUTPATIENT
Start: 2025-04-29 | End: 2025-04-29

## 2025-04-29 RX ORDER — ENOXAPARIN SODIUM 100 MG/ML
40 INJECTION SUBCUTANEOUS DAILY
Status: DISCONTINUED | OUTPATIENT
Start: 2025-04-29 | End: 2025-04-30 | Stop reason: HOSPADM

## 2025-04-29 RX ORDER — PANTOPRAZOLE SODIUM 40 MG/1
40 TABLET, DELAYED RELEASE ORAL
Status: DISCONTINUED | OUTPATIENT
Start: 2025-04-30 | End: 2025-04-30 | Stop reason: HOSPADM

## 2025-04-29 RX ORDER — SODIUM CHLORIDE 0.9 % (FLUSH) 0.9 %
5-40 SYRINGE (ML) INJECTION PRN
Status: DISCONTINUED | OUTPATIENT
Start: 2025-04-29 | End: 2025-04-30 | Stop reason: HOSPADM

## 2025-04-29 RX ORDER — ACETAMINOPHEN 325 MG/1
650 TABLET ORAL EVERY 4 HOURS PRN
Status: DISCONTINUED | OUTPATIENT
Start: 2025-04-29 | End: 2025-04-30 | Stop reason: HOSPADM

## 2025-04-29 RX ORDER — LEVOTHYROXINE SODIUM 50 UG/1
50 TABLET ORAL DAILY
Status: DISCONTINUED | OUTPATIENT
Start: 2025-04-29 | End: 2025-04-30 | Stop reason: HOSPADM

## 2025-04-29 RX ORDER — METOPROLOL SUCCINATE 25 MG/1
25 TABLET, EXTENDED RELEASE ORAL DAILY
Status: DISCONTINUED | OUTPATIENT
Start: 2025-04-29 | End: 2025-04-30 | Stop reason: HOSPADM

## 2025-04-29 RX ORDER — ONDANSETRON 4 MG/1
4 TABLET, ORALLY DISINTEGRATING ORAL EVERY 8 HOURS PRN
Status: DISCONTINUED | OUTPATIENT
Start: 2025-04-29 | End: 2025-04-30 | Stop reason: HOSPADM

## 2025-04-29 RX ADMIN — ATORVASTATIN CALCIUM 80 MG: 80 TABLET, FILM COATED ORAL at 20:41

## 2025-04-29 RX ADMIN — ASPIRIN 324 MG: 81 TABLET, CHEWABLE ORAL at 16:38

## 2025-04-29 RX ADMIN — SACUBITRIL AND VALSARTAN 1 TABLET: 24; 26 TABLET, FILM COATED ORAL at 20:41

## 2025-04-29 RX ADMIN — SODIUM CHLORIDE, PRESERVATIVE FREE 5 ML: 5 INJECTION INTRAVENOUS at 20:43

## 2025-04-29 RX ADMIN — ACETAMINOPHEN 1000 MG: 500 TABLET ORAL at 16:38

## 2025-04-29 ASSESSMENT — ENCOUNTER SYMPTOMS
NAUSEA: 0
VOMITING: 0
CHEST TIGHTNESS: 0
WHEEZING: 0
ABDOMINAL PAIN: 0
SHORTNESS OF BREATH: 0
COUGH: 0

## 2025-04-29 ASSESSMENT — PAIN - FUNCTIONAL ASSESSMENT: PAIN_FUNCTIONAL_ASSESSMENT: 0-10

## 2025-04-29 ASSESSMENT — PAIN SCALES - GENERAL
PAINLEVEL_OUTOF10: 0
PAINLEVEL_OUTOF10: 6
PAINLEVEL_OUTOF10: 0

## 2025-04-29 ASSESSMENT — HEART SCORE
ECG: NON-SPECIFC REPOLARIZATION DISTURBANCE/LBTB/PM

## 2025-04-29 NOTE — ED NOTES
Pt attached to ECG and continuous pulse ox.   Pt on cart with respirations easy and regular.    Patient on stretcher with no complaints. POC updated.

## 2025-04-29 NOTE — ED PROVIDER NOTES
[facility] ED  Emergency Department Encounter  Emergency Medicine Resident     Pt Name:Mina Goldberg  MRN: [id number]  Birthdate 1950  Date of evaluation: 4/29/25  PCP:  Fede Her MD  Note Started: 5:23 PM EDT      CHIEF COMPLAINT       Chief Complaint   Patient presents with    Chest Pain    Arm Pain       HISTORY OF PRESENT ILLNESS  (Location/Symptom, Timing/Onset, Context/Setting, Quality, Duration, Modifying Factors, Severity.)      Mina Goldberg is a 75 y.o. male who presents with substernal chest pain rating to left shoulder.  He states that it is only there on exertion.  He states that has been ongoing for the past week or so.  He denies trauma, denies falls, denies shortness of breath, denies hemoptysis, denies previous similar episodes in the past.    He does have a history of CLL, recently noted to be progressing, started on chemotherapy.  He does follow-up with hematology oncology.    He does also have a significant cardiac history including previous MI, status post PCI.  Of note, his hematologist/oncologist told him to stop taking Plavix in the context of his cancer.  He states he has not been taking this.    He does also complain of bilateral lower extremity claudication, worse on exertion.  He does also have a history of previous carotid endarterectomy.    PAST MEDICAL / SURGICAL / SOCIAL / FAMILY HISTORY      has a past medical history of Arthritis, Back pain, CAD (coronary artery disease), Carotid artery stenosis, CLL (chronic lymphocytic leukemia) (HCC), Colon polyps, COPD (chronic obstructive pulmonary disease) (HCC), GERD (gastroesophageal reflux disease), History of heart artery stent, Hyperlipidemia, Hypertension, Ischemic cardiomyopathy, Left heart failure (HCC), Lymphoma (HCC), MI (myocardial infarction) (HCC), Wears dentures, and Wears eyeglasses.     has a past surgical history that includes Lumbar disc surgery; Hydrocele surgery (Left); arthrotomy; lymph

## 2025-04-29 NOTE — ED PROVIDER NOTES
[facility] EMERGENCY DEPARTMENT  eMERGENCY dEPARTMENT eNCOUnter   Attending Attestation     Pt Name: Mina Goldberg  MRN: [id number]  Birthdate 1950  Date of evaluation: 4/29/25       Mina Goldberg is a 75 y.o. male who presents with Chest Pain and Arm Pain      5:48 PM EDT      History:  Patient presents with chest pain and arm pain.  Patient states that has been going on since Sunday.  Patient said it is moderate.  Patient said he was also losing  strength in the left hand prior to today.  Patient has no weakness now.  Patient is history of heart attack.  Exam: Heart rate and rhythm are regular.  Lungs are clear to auscultation bilaterally.  Abdomen is soft, nontender.  Patient is awake and alert and acting appropriately.    Plan for ACS workup, D-dimer, if negative workup plan for ops admission for cardiology to see.      heart score is 7    EKG shows sinus bradycardia with rate of 57.  Deviation.  No ST elevation or depression.  T waves are mildly hyperacute in V2 V3 V4.  Left bundle branch block.  Nonspecific EKG.  I performed a history and physical examination of the patient and discussed management with the resident. I reviewed the resident’s note and agree with the documented findings and plan of care. Any areas of disagreement are noted on the chart. I was personally present for the key portions of any procedures. I have documented in the chart those procedures where I was not present during the key portions. I have personally reviewed all images and agree with the resident's interpretation. I have reviewed the emergency nurses triage note. I agree with the chief complaint, past medical history, past surgical history, allergies, medications, social and family history as documented unless otherwise noted below. Documentation of the HPI, Physical Exam and Medical Decision Making performed by medical students or scribes is based on my personal performance of the HPI, PE and MDM. For Phys

## 2025-04-29 NOTE — ED NOTES
ED to inpatient nurses report      Chief Complaint:  Chief Complaint   Patient presents with    Chest Pain    Arm Pain     Present to ED from: Home    MOA:     LOC: alert and orientated to name, place, date  Mobility: Independent  Oxygen Baseline: Room air    Current needs required: Room air   Pending ED orders: None  Present condition: Stable     Why did the patient come to the ED? C/o chest pain that radiates into left shoulder. States pain worse with exertion and has been ongoing for 1 week. History of CLL on chemo-f/u with hem-onc. C/o bilateral lower extremity claudicaution. Bradycardic for duration of ED stay.   What is the plan?  Observation for cardiac risk score. Troponin elevated but not increasing. Cardiac evaluation  Any procedures or intervention occur? labs  Any safety concerns?? Hard of hearing, very particular about meds    Mental Status:  Level of Consciousness: Alert (0)    Psych Assessment:   Psychosocial  Psychosocial (WDL): Within Defined Limits  Vital signs   Vitals:    04/29/25 1602   BP: (!) 163/56   Pulse: 54   Resp: 15   Temp: 98.4 °F (36.9 °C)   SpO2: 99%        Vitals:  Patient Vitals for the past 24 hrs:   BP Temp Pulse Resp SpO2   04/29/25 1602 (!) 163/56 98.4 °F (36.9 °C) 54 15 99 %      Visit Vitals  BP (!) 163/56   Pulse 54   Temp 98.4 °F (36.9 °C)   Resp 15   SpO2 99%        LDAs:   Peripheral IV 04/29/25 Distal;Right Cephalic (Active)   Site Assessment Clean, dry & intact 04/29/25 1614   Line Status Blood return noted 04/29/25 1614   Phlebitis Assessment No symptoms 04/29/25 1614   Infiltration Assessment 0 04/29/25 1614       Ambulatory Status:  Presents to emergency department  because of falls (Syncope, seizure, or loss of consciousness): No, Age > 70: No, Altered Mental Status, Intoxication with alcohol or substance confusion (Disorientation, impaired judgment, poor safety awaremess, or inability to follow instructions): No, Impaired Mobility: Ambulates or transfers with  assistive devices or assistance; Unable to ambulate or transer.: No, Nursing Judgement: No    Diagnosis:  DISPOSITION Admitted 2025 07:15:49 PM   Final diagnoses:   Angina of effort        Consults:  IP CONSULT TO CARDIOLOGY     Treatment Team:   Treatment Team:   Otto Neely MD Jarrah, Kareem, MD Miller, Rebekka, AMADEO Lambert, Alesha S, DO    Treatment:  ED Course as of 25e 2025   165 WBC(!!): 30.3  CLL, baseline [KJ]   1657 D-Dimer, Quant: 0.31  PE excluded [KJ]   1724 HS=7 prior to troponins.  Did discuss with the patient, likely he will require admission regardless  of trops. [KJ]   1735 Troponin, High Sensitivity(!): 24  2 x baseline [KJ]   1855 Troponin, High Sensitivity(!): 23 [KJ]   1928 Did discuss with patient, will admit for anginal workup.  Troponins 2 times baseline, heart score of 8.  Cardiology consult placed under attending.  Given significant cardiac history, no stress test ordered, patient may benefit from cardiac catheterization as opposed to stress test.  Will defer to cardiology. [KJ]   1928 Home chemo meds ordered inpatient [KJ]      ED Course User Index  [KJ] Jerry Anthony MD          Skin Assessment:        Pain Score:  Pain Assessment  Pain Assessment: 0-10  Pain Level: 6      SOCIAL HISTORY       Social History     Socioeconomic History    Marital status:    Occupational History    Occupation:    Tobacco Use    Smoking status: Former     Current packs/day: 0.00     Average packs/day: 0.5 packs/day for 24.0 years (12.0 ttl pk-yrs)     Types: Cigarettes     Start date: 3/12/1996     Quit date: 3/12/2020     Years since quittin.1    Smokeless tobacco: Never   Vaping Use    Vaping status: Never Used   Substance and Sexual Activity    Alcohol use: Yes     Alcohol/week: 4.0 standard drinks of alcohol     Types: 4 Cans of beer per week     Comment: 5 x a week    Drug use: No   Social History Narrative    Lives at home alone. Has two

## 2025-04-30 ENCOUNTER — APPOINTMENT (OUTPATIENT)
Dept: NUCLEAR MEDICINE | Age: 75
End: 2025-04-30
Payer: MEDICARE

## 2025-04-30 ENCOUNTER — HOSPITAL ENCOUNTER (OUTPATIENT)
Age: 75
Setting detail: OBSERVATION
Discharge: HOME | End: 2025-05-02
Payer: MEDICARE

## 2025-04-30 VITALS
BODY MASS INDEX: 28.2 KG/M2 | DIASTOLIC BLOOD PRESSURE: 59 MMHG | HEART RATE: 70 BPM | WEIGHT: 197 LBS | OXYGEN SATURATION: 92 % | TEMPERATURE: 97.7 F | RESPIRATION RATE: 18 BRPM | HEIGHT: 70 IN | SYSTOLIC BLOOD PRESSURE: 136 MMHG

## 2025-04-30 LAB
ECHO BSA: 2.1 M2
EKG ATRIAL RATE: 57 BPM
EKG P AXIS: 39 DEGREES
EKG P-R INTERVAL: 172 MS
EKG Q-T INTERVAL: 468 MS
EKG QRS DURATION: 160 MS
EKG QTC CALCULATION (BAZETT): 455 MS
EKG R AXIS: -21 DEGREES
EKG T AXIS: 115 DEGREES
EKG VENTRICULAR RATE: 57 BPM
STRESS BASELINE DIAS BP: 86 MMHG
STRESS BASELINE HR: 51 BPM
STRESS BASELINE SYS BP: 133 MMHG
STRESS ESTIMATED WORKLOAD: 1 METS
STRESS PEAK DIAS BP: 86 MMHG
STRESS PEAK SYS BP: 133 MMHG
STRESS PERCENT HR ACHIEVED: 49 %
STRESS POST PEAK HR: 71 BPM
STRESS RATE PRESSURE PRODUCT: 9443 BPM*MMHG
STRESS ST DEPRESSION: 0 MM
STRESS TARGET HR: 146 BPM

## 2025-04-30 PROCEDURE — G0378 HOSPITAL OBSERVATION PER HR: HCPCS

## 2025-04-30 PROCEDURE — 99223 1ST HOSP IP/OBS HIGH 75: CPT | Performed by: INTERNAL MEDICINE

## 2025-04-30 PROCEDURE — A9500 TC99M SESTAMIBI: HCPCS | Performed by: INTERNAL MEDICINE

## 2025-04-30 PROCEDURE — 3430000000 HC RX DIAGNOSTIC RADIOPHARMACEUTICAL: Performed by: INTERNAL MEDICINE

## 2025-04-30 PROCEDURE — 2500000003 HC RX 250 WO HCPCS: Performed by: INTERNAL MEDICINE

## 2025-04-30 PROCEDURE — 6360000002 HC RX W HCPCS: Performed by: INTERNAL MEDICINE

## 2025-04-30 PROCEDURE — 93010 ELECTROCARDIOGRAM REPORT: CPT | Performed by: INTERNAL MEDICINE

## 2025-04-30 PROCEDURE — 2500000003 HC RX 250 WO HCPCS

## 2025-04-30 PROCEDURE — 6370000000 HC RX 637 (ALT 250 FOR IP)

## 2025-04-30 PROCEDURE — 78452 HT MUSCLE IMAGE SPECT MULT: CPT

## 2025-04-30 PROCEDURE — 93017 CV STRESS TEST TRACING ONLY: CPT

## 2025-04-30 PROCEDURE — 6370000000 HC RX 637 (ALT 250 FOR IP): Performed by: INTERNAL MEDICINE

## 2025-04-30 RX ORDER — SODIUM CHLORIDE 9 MG/ML
500 INJECTION, SOLUTION INTRAVENOUS CONTINUOUS PRN
Status: DISCONTINUED | OUTPATIENT
Start: 2025-04-30 | End: 2025-04-30

## 2025-04-30 RX ORDER — SPIRONOLACTONE 25 MG/1
25 TABLET ORAL DAILY
Status: DISCONTINUED | OUTPATIENT
Start: 2025-04-30 | End: 2025-04-30 | Stop reason: HOSPADM

## 2025-04-30 RX ORDER — TETRAKIS(2-METHOXYISOBUTYLISOCYANIDE)COPPER(I) TETRAFLUOROBORATE 1 MG/ML
37.5 INJECTION, POWDER, LYOPHILIZED, FOR SOLUTION INTRAVENOUS
Status: COMPLETED | OUTPATIENT
Start: 2025-04-30 | End: 2025-04-30

## 2025-04-30 RX ORDER — SODIUM CHLORIDE 0.9 % (FLUSH) 0.9 %
5-40 SYRINGE (ML) INJECTION PRN
Status: DISCONTINUED | OUTPATIENT
Start: 2025-04-30 | End: 2025-04-30

## 2025-04-30 RX ORDER — TETRAKIS(2-METHOXYISOBUTYLISOCYANIDE)COPPER(I) TETRAFLUOROBORATE 1 MG/ML
15.6 INJECTION, POWDER, LYOPHILIZED, FOR SOLUTION INTRAVENOUS
Status: COMPLETED | OUTPATIENT
Start: 2025-04-30 | End: 2025-04-30

## 2025-04-30 RX ORDER — ALBUTEROL SULFATE 90 UG/1
2 INHALANT RESPIRATORY (INHALATION) PRN
Status: CANCELLED | OUTPATIENT
Start: 2025-04-30 | End: 2025-04-30

## 2025-04-30 RX ORDER — METOPROLOL TARTRATE 1 MG/ML
5 INJECTION, SOLUTION INTRAVENOUS EVERY 5 MIN PRN
Status: DISCONTINUED | OUTPATIENT
Start: 2025-04-30 | End: 2025-04-30

## 2025-04-30 RX ORDER — REGADENOSON 0.08 MG/ML
0.4 INJECTION, SOLUTION INTRAVENOUS
Status: COMPLETED | OUTPATIENT
Start: 2025-04-30 | End: 2025-04-30

## 2025-04-30 RX ORDER — ATROPINE SULFATE 0.1 MG/ML
0.5 INJECTION INTRAVENOUS EVERY 5 MIN PRN
Status: CANCELLED | OUTPATIENT
Start: 2025-04-30 | End: 2025-04-30

## 2025-04-30 RX ORDER — ALBUTEROL SULFATE 90 UG/1
2 INHALANT RESPIRATORY (INHALATION) PRN
Status: DISCONTINUED | OUTPATIENT
Start: 2025-04-30 | End: 2025-04-30

## 2025-04-30 RX ORDER — SODIUM CHLORIDE 9 MG/ML
500 INJECTION, SOLUTION INTRAVENOUS CONTINUOUS PRN
Status: CANCELLED | OUTPATIENT
Start: 2025-04-30 | End: 2025-04-30

## 2025-04-30 RX ORDER — REGADENOSON 0.08 MG/ML
0.4 INJECTION, SOLUTION INTRAVENOUS
Status: CANCELLED | OUTPATIENT
Start: 2025-04-30

## 2025-04-30 RX ORDER — ATROPINE SULFATE 0.1 MG/ML
0.5 INJECTION INTRAVENOUS EVERY 5 MIN PRN
Status: DISCONTINUED | OUTPATIENT
Start: 2025-04-30 | End: 2025-04-30

## 2025-04-30 RX ORDER — AMINOPHYLLINE 25 MG/ML
50 INJECTION, SOLUTION INTRAVENOUS PRN
Status: DISCONTINUED | OUTPATIENT
Start: 2025-04-30 | End: 2025-04-30

## 2025-04-30 RX ORDER — NITROGLYCERIN 0.4 MG/1
0.4 TABLET SUBLINGUAL EVERY 5 MIN PRN
Status: CANCELLED | OUTPATIENT
Start: 2025-04-30 | End: 2025-04-30

## 2025-04-30 RX ORDER — AMINOPHYLLINE 25 MG/ML
50 INJECTION, SOLUTION INTRAVENOUS PRN
Status: CANCELLED | OUTPATIENT
Start: 2025-04-30 | End: 2025-04-30

## 2025-04-30 RX ORDER — METOPROLOL TARTRATE 1 MG/ML
5 INJECTION, SOLUTION INTRAVENOUS EVERY 5 MIN PRN
Status: CANCELLED | OUTPATIENT
Start: 2025-04-30 | End: 2025-04-30

## 2025-04-30 RX ORDER — NITROGLYCERIN 0.4 MG/1
0.4 TABLET SUBLINGUAL EVERY 5 MIN PRN
Status: DISCONTINUED | OUTPATIENT
Start: 2025-04-30 | End: 2025-04-30

## 2025-04-30 RX ORDER — SODIUM CHLORIDE 0.9 % (FLUSH) 0.9 %
5-40 SYRINGE (ML) INJECTION PRN
Status: CANCELLED | OUTPATIENT
Start: 2025-04-30 | End: 2025-04-30

## 2025-04-30 RX ADMIN — SACUBITRIL AND VALSARTAN 1 TABLET: 24; 26 TABLET, FILM COATED ORAL at 12:26

## 2025-04-30 RX ADMIN — EMPAGLIFLOZIN 10 MG: 10 TABLET, FILM COATED ORAL at 12:28

## 2025-04-30 RX ADMIN — SPIRONOLACTONE 25 MG: 25 TABLET, FILM COATED ORAL at 12:25

## 2025-04-30 RX ADMIN — SODIUM CHLORIDE, PRESERVATIVE FREE 10 ML: 5 INJECTION INTRAVENOUS at 09:37

## 2025-04-30 RX ADMIN — ATORVASTATIN CALCIUM 80 MG: 80 TABLET, FILM COATED ORAL at 12:25

## 2025-04-30 RX ADMIN — REGADENOSON 0.4 MG: 0.08 INJECTION, SOLUTION INTRAVENOUS at 10:58

## 2025-04-30 RX ADMIN — ASPIRIN 81 MG: 81 TABLET, COATED ORAL at 12:25

## 2025-04-30 RX ADMIN — TETRAKIS(2-METHOXYISOBUTYLISOCYANIDE)COPPER(I) TETRAFLUOROBORATE 15.6 MILLICURIE: 1 INJECTION, POWDER, LYOPHILIZED, FOR SOLUTION INTRAVENOUS at 08:47

## 2025-04-30 RX ADMIN — TETRAKIS(2-METHOXYISOBUTYLISOCYANIDE)COPPER(I) TETRAFLUOROBORATE 37.5 MILLICURIE: 1 INJECTION, POWDER, LYOPHILIZED, FOR SOLUTION INTRAVENOUS at 11:01

## 2025-04-30 RX ADMIN — SODIUM CHLORIDE, PRESERVATIVE FREE 10 ML: 5 INJECTION INTRAVENOUS at 10:58

## 2025-04-30 NOTE — PROGRESS NOTES
During the medication review, the patient stated to this nurse that he had already taken his home medication Zanubrutinib. Later, the pharmacy contacted this nurse to inquire whether a family member could bring in the medication, as the pharmacy did not have it in stock. The nurse relayed the patient’s statement that he had already taken the dose. The nurse then followed up with the patient to ask if he could have someone bring the medication in. The patient stated that he does not have anyone available to do so and added that he plans to leave today because he feels fine. He expressed that he does not feel the medication is needed at this time and believes he will be fine without it temporarily. Nurse explained the risk/benefits to patient. Provider notified.

## 2025-04-30 NOTE — CONSULTS
[facility]    Consult / H&P               Today's Date: 4/30/2025  Patient Name: Mina Goldberg  Date of admission: 4/29/2025  4:03 PM  Patient's age: 75 y.o., 1950  Admission Dx: Angina of effort [I20.89]  Claudication [I73.9]    Requesting Physician: Otto Neely MD    Cardiac Evaluation Reason:  vasculopath, elevated troponins, heart score 8, chest pain on exertion     History Obtained From: patient and chart review     History of Present Illness:    This patient 75 y.o. years old with past medical history given below.  Patient in the hospital after experiencing a midsternal chest ache that is nonradiating that lasted for about 5 minutes while trying to do a gallon of bug spray in his left arm that seem to want to open.  He endorses episodes decided to come to the ED for evaluation.  He denies shortness of breath, palpitations, bradycardia, loss of consciousness, recent sickness involving coughing/wheezing.  He endorsed a history of leg pain and endorsed that whenever he is doing activity and/or bending over, his vision gets dark.  Past medical history significant for CLL and currently on chemo therapy (he follows up with heme-onc who told to stop taking Plavix given his cancer history), previous MI status post PCI.  ED notable for EKG completed on 04/29/2026 reporting sinus bradycardia and LBBB, troponin elevated at 24 with repeat troponin 23, elevated proBNP 419, negative D-dimer 0.31, unremarkable BMP, and CBC notable for leukocytosis WBC 30.3 and stable normocytic anemia Hgb 12.3 with MCV 96.  CXR negative.  Previous cardiac workup notable for NM myocardial stress test completed 2/20/2023 which reported large inferior wall infarct and moderate size lateral wall infarct with brice-infarct edema in the inferolateral wall,, LVEF 35%, and borderline intermediate/high risk.  Cardiac blood pool needed test completed on 2/18/2024 cardiomyopathy/coronary arteriosclerosis; reported LVEF  any questions.    Rylan Lambert DO, FACC, FACOI, RPVI, ALMA, NICOLAS  [facility]  [email or web address]  [phone]

## 2025-04-30 NOTE — PROGRESS NOTES
On reevaluation    [facility]  CDU / OBSERVATION ENCOUNTER  ATTENDING NOTE      Patient evaluated after stress test.  Patient currently pain-free.    Patient is absolutely insistent on leaving.  Patient had positive stress test but it very similar to the one from 2023.       2/20/2023 7:04 am     TECHNIQUE:  For the rest study, 15.4 mCi of Tc-99m labeled sestamibi were injected.  SPECT images were acquired.     Under cardiology supervision, 0.4 mg Lexiscan was infused.  After  pharmacologic stress, 41 mCi of Tc-99m labeled sestamibi were injected.  SPECT images with ECG gating were acquired.     COMPARISON:  None Available.     HISTORY:  ORDERING SYSTEM PROVIDED HISTORY: Chest pain  TECHNOLOGIST PROVIDED HISTORY:  Reason for Exam: Chest pain  Procedure Type->Rx  Reason for Exam: chest pain     FINDINGS:  Large moderate fixed perfusion defect in the inferior wall which encroaches  into the apex.  There is decreased myocardial thickening and there is also  hypokinesis.  Compatible with inferior wall infarct.     In the mid to apicolateral wall there is fixed defect.  There is  reversibility in the inferolateral wall.  There is hypokinesis and decreased  myocardial thickening but less pronounced compared to the inferior wall.  Compatible with lateral wall infarct with accompanying brice-infarct ischemia  in the inferolateral wall.     Perfusion scores are visually adjusted to account for artifact.     Summed stress score:  15     Summed rest score:  13     Summed difference score: 2     Function:     End diastolic volume:  177mL     Left ventricular ejection fraction:  35%    I did discuss these findings with the patient.  Patient is frustrated at his ongoing admission and his pain-free state.  Patient is still insistent on leaving and insistent on outpatient follow-up.    After discussion with cardiology and review of prior testing it is felt that it is appropriate for the patient to return as

## 2025-04-30 NOTE — PROGRESS NOTES
Congestive Heart Failure Education completed and charted. CHF booklet given. Patient was receptive to education.    Discussed the  importance of medication compliance.    Discussed the importance of a heart healthy diet. Discussed 2000 mg sodium-restricted daily diet.  Patient instructed to limit fluid intake to  1.5 to 2 liters per day.    Patient instructed to weigh self at the same time of each day each morning, reinforced teaching to monitor for 3-5 lb weight increase over 1-2 days notify physician if change noted.      Signs and symptoms of CHF discussed with patient, such as feeling more tired than normal, feeling short of breath, coughing that increases when lying down, sudden weight gain, swelling of the feet, legs or belly.  Patient verbalized understanding to notify physician office if these symptoms occur.  EF 30-35%  Pt needing additional support, stating he has no plans to reduce salt use. Salt packets provided to pt on his lunch tray, so RN was asked to obtain low Na+ diet order if he is admitted/ stays for another meal.

## 2025-04-30 NOTE — H&P
membranes, uvula midline   NECK: supple, symmetric   BACK: symmetric   LUNGS: clear to auscultation bilaterally   CARDIOVASCULAR: regular rate and rhythm, no murmurs, rubs or gallops   ABDOMEN: soft, non-tender, non-distended with normal active bowel sounds   NEUROLOGIC:  MAEx4, no focal sensory or motor deficits   MUSCULOSKELETAL: no clubbing, cyanosis or edema   SKIN: no rash or wounds       DIFFERENTIAL DIAGNOSIS/MDM:     FROM ED MEDICAL DECISION MAKING NOTE:     75-year-old male, extensive vascular history including history of CAD, carotid stenosis status post endarterectomy, claudication.  No recent stress test, no recent cath.  History is very concerning for CAD, anginal type pain worse on exertion.  ECG unremarkable here.  Will obtain troponins.  Some concern as well for possible PE.  Patient not anticoagulated.  Will obtain D-dimer, potentially will be elevated given his ongoing cancer however if not elevated, PE will be ruled out.  Lungs are clear to auscultation bilaterally, will obtain chest x-ray however to rule out structural abnormality.  Will obtain CBC, rule out anemia, BMP, rule out electrolyte abnormality.       DIAGNOSTIC RESULTS       RADIOLOGY:   I directly visualized the following  images and reviewed the radiologist interpretations:    Nuclear stress test with myocardial perfusion  Result Date: 4/30/2025    Stress Test: A pharmacological stress test was performed using regadenoson (Lexiscan). PO caffeine given as a reversal agent. The patient reported no symptoms during the stress test. EXAMINATION: MYOCARDIAL PERFUSION IMAGING [4/30/2025 11:56 am] TECHNIQUE: For the rest study, [] mCi of Tc-99m labeled sestamibi were injected.  SPECT images were acquired. Under cardiology supervision, 0.4 mg Lexiscan was infused.  After pharmacologic stress, [] mCi of Tc-99m labeled sestamibi were injected.  SPECT images with ECG gating were acquired. COMPARISON: [None Available.] HISTORY: [ORDERING SYSTEM  greater than 1.19 for exercise and greater than 1.39 for regadenoson) Intermediate risk (1% to 3% annual death or MI) 1. Mild/moderate resting LV dysfunction (LVEF 35% to 49%) not readily explained by non coronary causes. 2. Resting perfusion abnormalities in 5%-9.9% of the myocardium in patients without a history or prior evidence of MI 3. Stress-induced perfusion abnormality encumbering 5%-9.9% of the myocardium or stress segmental scores indicating 1 vascular territory with abnormalities but without LV dilation 4. Small wall motion abnormality involving 1-2 segments and only 1 coronary bed. Low Risk (Less than 1% annual death or MI) 1. Normal or small myocardial perfusion defect at rest or with stress encumbering less than 5% of the myocardium.     [Large infarct in the mid and apical inferior wall extending into the inferolateral wall with some brice-infarct ischemia in the inferolateral wall. LVEF 33%.] Risk stratification: [High risk.]  The findings were sent to the Radiology Results Communication Center at [1:13 pm] on [4/30/2025] to be communicated to a licensed caregiver.  Author: Johny Lopez MD        LABS:  I have reviewed and interpreted all available lab results.  Labs Reviewed   TROPONIN - Abnormal; Notable for the following components:       Result Value    Troponin, High Sensitivity 24 (*)     All other components within normal limits   TROPONIN - Abnormal; Notable for the following components:    Troponin, High Sensitivity 23 (*)     All other components within normal limits   CBC - Abnormal; Notable for the following components:    WBC 30.3 (*)     RBC 4.07 (*)     Hemoglobin 12.3 (*)     Hematocrit 39.1 (*)     RDW 14.5 (*)     Platelet, Fluorescence 63 (*)     All other components within normal limits   BASIC METABOLIC PANEL - Abnormal; Notable for the following components:    Glucose 148 (*)     All other components within normal limits   BRAIN NATRIURETIC PEPTIDE - Abnormal; Notable for the

## 2025-04-30 NOTE — PROGRESS NOTES
[facility]  PROGRESS NOTE    Shift date: 04/29/2025  Shift day: Tuesday   Shift # 2    Room # OBS 24/24-1   Name: Mina Goldberg                Yazdanism:    Place of Tenriism:     Referral: Routine Visit    Admit Date & Time: 4/29/2025  4:03 PM    Assessment:  Mina Goldberg is a 75 y.o. male in the hospital. Patient did not appear to mind  presence and engaged in conversation about health and hospital visit. Pt had family support in room.      Intervention:   provided a supportive presence through active listening and words of affirmation.    Outcome:  Patient appeared receptive to  visit.    Plan:  Chaplains will remain available to offer spiritual and emotional support as needed.      Electronically signed by Chaplain Jonathan, on 4/29/2025 at 9:39 PM.  [facility]  [facility]  [phone]

## 2025-04-30 NOTE — DISCHARGE INSTRUCTIONS
Resume your medications as you have been taking them.  Call the cardiologist office for follow-up appointment.  Be sure to tell them this is a hospital follow-up and that you should be seen in the next 1 to 2 weeks.  You were seen today by Dr. Lambert, cardiologist, and the recommendations were for you to stay for heart catheterization or at least have a catheterization done in the next 1 to 2 weeks.    Your testing included cardiac stress testing.    You saw the following specialists cardiologist.    We no longer need to keep you in the hospital but that does not mean you are done being treated  -Take your prescribed medications as directed  -Follow-up with your primary care physician or the specialist designated or both as scheduled    Please return if your condition worsens or there are new symptoms    If there are concerns that have not been addressed while you have been in the hospital please let the discharge nurse know so the physician can be informed -it is easier to fix problems while you are still here    If you have questions after you have left the hospital please call the unit at  and ask for the observation resident on-call

## 2025-05-01 ENCOUNTER — CARE COORDINATION (OUTPATIENT)
Dept: CARE COORDINATION | Age: 75
End: 2025-05-01

## 2025-05-01 LAB
EKG ATRIAL RATE: 48 BPM
EKG P AXIS: 53 DEGREES
EKG P-R INTERVAL: 190 MS
EKG Q-T INTERVAL: 510 MS
EKG QRS DURATION: 158 MS
EKG QTC CALCULATION (BAZETT): 455 MS
EKG R AXIS: -11 DEGREES
EKG T AXIS: 110 DEGREES
EKG VENTRICULAR RATE: 48 BPM

## 2025-05-01 PROCEDURE — 93010 ELECTROCARDIOGRAM REPORT: CPT | Performed by: INTERNAL MEDICINE

## 2025-05-01 NOTE — CARE COORDINATION
Care Transitions Note    Initial Call - Call within 2 business days of discharge: Yes  Incoming call back to CTN    Patient Current Location:  Home: [facility] [address] [place] [address]    Care Transition Nurse contacted the patient by telephone to perform post hospital discharge assessment, verified name and  as identifiers.  Provided introduction to self, and explanation of the Care Transition Nurse role.    Patient: Mina Goldberg                                 Patient : 1950   MRN: [id number]                               Reason for Admission: chest pain, HTN  Discharge Date: 25         RURS: No data recorded    - ST OBS admission for chest pain, HTN.  Echo shows EF 33% - previous cath shows nonobstructive CAD  Nuclear stress test shows large inferior infarct with surrounding ischemia  Last Discharge Facility       Date Complaint Diagnosis Description Type Department Provider    25 Chest Pain; Arm Pain Angina of effort ... ED to Hosp-Admission (Discharged) (ADMITTED) [facility] OBS Otto Neely MD; Daniela Nolasco...            Was this an external facility discharge? No    Additional needs identified to be addressed with provider                 Method of communication with provider: chart routing.to PCP clinical pool    Patients top risk factors for readmission: medical condition-chest pain, HTN    Interventions to address risk factors:   Review of patient management of conditions/medications: reviewed  appts    Care Summary Note:   - [facility] OBS admission for chest pain, HTN.  Echo shows EF 33% - previous cath shows nonobstructive CAD  Nuclear stress test shows large inferior infarct with surrounding ischemia    Mina verbalizes frustration re: hospital admission.  He wanted to see a cardiologist to explain his stress test results prior to discharge and was upset that it didn't happen.  Has cardiology appt scheduled for  to discuss all.    Unable to review medications with

## 2025-05-01 NOTE — CARE COORDINATION
Care Transitions Note    Initial Call #1- Call within 2 business days of discharge: Yes  Attempted to reach patient for transitions of care follow up. Unable to reach patient.    Outreach Attempts:   Unable to leave message.   Rang and rang, then indicated \"user busy\" and disconnected     Patient: Mina Goldberg      Patient : 1950   MRN: [id number]      Reason for Admission: chest pain, HTN  Discharge Date: 25    RURS: No data recorded    - [facility] OBS admission for chest pain, HTN.  Echo shows EF 33% - previous cath shows nonobstructive CAD    Last Discharge Facility       Date Complaint Diagnosis Description Type Department Provider    25 Chest Pain; Arm Pain Angina of effort ... ED to Hosp-Admission (Discharged) (ADMITTED) [facility] OBS Otto Neely MD; Daniela Nolasco...            Was this an external facility discharge? No    Follow Up Appointment:   Patient has hospital follow up appointment scheduled within 7 days of discharge.    Future Appointments         Provider Specialty Dept Phone    2025 9:15 AM Breonna Reyes APRN - [facility] Cardiology [phone]    2025 11:00 AM Derick Raygoza MD Oncology [phone]    9/15/2025 11:00 AM Fede Her MD Family Medicine [phone]            Plan for follow-up call in 6-10 days     Radha Oleary RN

## 2025-05-05 NOTE — DISCHARGE SUMMARY
daily     tamsulosin 0.4 MG capsule  Commonly known as: FLOMAX     Zanubrutinib 80 MG Caps  Take 160 mg by mouth in the morning and at bedtime              Diet:  No diet orders on file, advance as tolerated     Activity:  As tolerated    Consultants: IP CONSULT TO CARDIOLOGY    Procedures:  Not indicated      Diagnostic Test:   Results for orders placed or performed during the hospital encounter of 04/29/25   Troponin   Result Value Ref Range    Troponin, High Sensitivity 24 (H) 0 - 22 ng/L   Troponin   Result Value Ref Range    Troponin, High Sensitivity 23 (H) 0 - 22 ng/L   CBC   Result Value Ref Range    WBC 30.3 (HH) 3.5 - 11.3 k/uL    RBC 4.07 (L) 4.21 - 5.77 m/uL    Hemoglobin 12.3 (L) 13.0 - 17.0 g/dL    Hematocrit 39.1 (L) 40.7 - 50.3 %    MCV 96.1 82.6 - 102.9 fL    MCH 30.2 25.2 - 33.5 pg    MCHC 31.5 28.4 - 34.8 g/dL    RDW 14.5 (H) 11.8 - 14.4 %    Platelets See Reflexed IPF Result 138 - 453 k/uL    Platelet, Fluorescence 63 (L) 138 - 453 k/uL    Platelet, Immature Fraction 7.0 1.1 - 10.3 %    NRBC Automated 0.0 0.0 per 100 WBC   D-Dimer, Quantitative   Result Value Ref Range    D-Dimer, Quant 0.31 0.00 - 0.57 ug/mL FEU   Basic Metabolic Panel   Result Value Ref Range    Sodium 142 136 - 145 mmol/L    Potassium 4.1 3.7 - 5.3 mmol/L    Chloride 105 98 - 107 mmol/L    CO2 25 20 - 31 mmol/L    Anion Gap 12 9 - 16 mmol/L    Glucose 148 (H) 74 - 99 mg/dL    BUN 13 8 - 23 mg/dL    Creatinine 1.0 0.7 - 1.2 mg/dL    Est, Glom Filt Rate 79 >60 mL/min/1.73m2    Calcium 9.5 8.6 - 10.4 mg/dL   Brain Natriuretic Peptide   Result Value Ref Range    NT Pro- (H) 0 - 125 pg/mL   EKG 12 Lead   Result Value Ref Range    Ventricular Rate 48 BPM    Atrial Rate 48 BPM    P-R Interval 190 ms    QRS Duration 158 ms    Q-T Interval 510 ms    QTc Calculation (Bazett) 455 ms    P Axis 53 degrees    R Axis -11 degrees    T Axis 110 degrees   EKG 12 Lead   Result Value Ref Range    Ventricular Rate 57 BPM    Atrial Rate 57

## 2025-05-07 ENCOUNTER — CARE COORDINATION (OUTPATIENT)
Dept: CARE COORDINATION | Age: 75
End: 2025-05-07

## 2025-05-07 NOTE — CARE COORDINATION
Care Transitions Note    Follow Up Call  #1 attempt  Attempted to reach patient for transitions of care follow up.  Unable to reach patient.    No Answer/Busy - Rang and rang, then indicated \"user busy\" and disconnected     Outreach Attempts:   Unable to leave message.     Noted patient did go to cardiology appt , NTG prn prescribed    Patient: Mina Goldberg                                 Patient : 1950   MRN: [id number]                               Reason for Admission: chest pain, HTN  Discharge Date: 25         RURS: No data recorded    - [facility] OBS admission for chest pain, HTN.  Echo shows EF 33% - previous cath shows nonobstructive CAD  Nuclear stress test shows large inferior infarct with surrounding ischemia    Follow Up Appointment:   Future Appointments         Provider Specialty Dept Phone    2025 11:00 AM Derick Raygoza MD Oncology [phone]    9/15/2025 11:00 AM Fede Her MD Family Medicine [phone]            Plan for follow-up call in 2-5 days based on severity of symptoms and risk factors.   Plan for next call: symptom management-review any symptoms, chest pain, SOB  follow-up appointment-Review  cardio appt, check if he has decided about PCP f/u  medication management-check if any changes and if all clarified at cardio office appt.  Check if PCP office has scheduled or notified patient of appt (was routed on initial call)    Radha Oleary RN

## 2025-05-08 LAB
ALBUMIN: 3.9 G/DL
ALK PHOSPHATASE: 59 U/L
ALT SERPL-CCNC: 20 U/L
AST SERPL-CCNC: 23 U/L
BASOPHILS # BLD: 0.06 X10^3UL
BILIRUB SERPL-MCNC: 1.1 MG/DL
BUN BLDV-MCNC: 14 MG/DL
CALCIUM SERPL-MCNC: 9 MG/DL
CHLORIDE BLD-SCNC: 105 MMOL/L
CO2: 27 MMOL/L
CREAT SERPL-MCNC: 0.87 MG/DL
EGFR (CKD-EPI): 90.5 ML/M1.7
EOSINOPHIL # BLD: 0.24 X10^3UL
ERYTHROCYTE [DISTWIDTH] IN BLOOD BY AUTOMATED COUNT: 49.7 FL
GLUCOSE: 110 MG/DL
HCT VFR BLD CALC: 38.7 %
HEMOGLOBIN: 12.4 G/DL
IMMATURE GRANULOCYTES %: 0.12 X10^3UL
LACTATE DEHYDROGENASE: 238 U/L
LYMPHOCYTES # BLD: 23.49 X10^3UL
MCH RBC QN AUTO: 31.2 PG
MCHC RBC AUTO-ENTMCNC: 32 G/DL
MCV RBC AUTO: 97.2 FL
MONOCYTES: 0.61 X10^3UL
NEUTROPHILS: 2.45 X10^3UL
NUCLEATED RED BLOOD CELLS: 0
NUCLEATED RED BLOOD CELLS: 0
PLATELET # BLD: 65 X10^3UL
POTASSIUM SERPL-SCNC: 4.5 MMOL/L
RBC # BLD: 3.98 X10^6UL
SODIUM BLD-SCNC: 139 MMOL/L
TOTAL PROTEIN: 5.4 G/DL
WBC # BLD: 26.97 X10^3UL

## 2025-05-09 ENCOUNTER — CARE COORDINATION (OUTPATIENT)
Dept: CARE COORDINATION | Age: 75
End: 2025-05-09

## 2025-05-09 NOTE — CARE COORDINATION
Care Transitions Note    Follow Up Call     Attempted to reach patient for transitions of care follow up.  Unable to reach patient.      Outreach Attempts:# 2  Unable to leave message.       Care Summary Note: #2 user busy will resolve if no return call. Noted pt attended cardiology appointment routed pool again to address hospital follow up. Will refer to ACM.    Follow Up Appointment:   Future Appointments         Provider Specialty Dept Phone    5/16/2025 11:00 AM Derick Raygoza MD Oncology [phone]    9/15/2025 11:00 AM Fede Her MD Family Medicine [phone]            No further follow-up call indicated based on severity of symptoms and risk factors.  Cassi Davis LPN

## 2025-05-16 ENCOUNTER — OFFICE VISIT (OUTPATIENT)
Age: 75
End: 2025-05-16
Payer: MEDICARE

## 2025-05-16 ENCOUNTER — TELEPHONE (OUTPATIENT)
Age: 75
End: 2025-05-16

## 2025-05-16 VITALS
BODY MASS INDEX: 28.93 KG/M2 | TEMPERATURE: 97.4 F | HEART RATE: 51 BPM | WEIGHT: 201.6 LBS | DIASTOLIC BLOOD PRESSURE: 51 MMHG | RESPIRATION RATE: 18 BRPM | SYSTOLIC BLOOD PRESSURE: 125 MMHG

## 2025-05-16 DIAGNOSIS — D69.6 THROMBOCYTOPENIA, UNSPECIFIED: ICD-10-CM

## 2025-05-16 DIAGNOSIS — C91.10 CLL (CHRONIC LYMPHOCYTIC LEUKEMIA) (HCC): Primary | ICD-10-CM

## 2025-05-16 PROCEDURE — 1159F MED LIST DOCD IN RCRD: CPT | Performed by: INTERNAL MEDICINE

## 2025-05-16 PROCEDURE — 3078F DIAST BP <80 MM HG: CPT | Performed by: INTERNAL MEDICINE

## 2025-05-16 PROCEDURE — 3017F COLORECTAL CA SCREEN DOC REV: CPT | Performed by: INTERNAL MEDICINE

## 2025-05-16 PROCEDURE — 99214 OFFICE O/P EST MOD 30 MIN: CPT | Performed by: INTERNAL MEDICINE

## 2025-05-16 PROCEDURE — 1123F ACP DISCUSS/DSCN MKR DOCD: CPT | Performed by: INTERNAL MEDICINE

## 2025-05-16 PROCEDURE — 1125F AMNT PAIN NOTED PAIN PRSNT: CPT | Performed by: INTERNAL MEDICINE

## 2025-05-16 PROCEDURE — G8427 DOCREV CUR MEDS BY ELIG CLIN: HCPCS | Performed by: INTERNAL MEDICINE

## 2025-05-16 PROCEDURE — G8417 CALC BMI ABV UP PARAM F/U: HCPCS | Performed by: INTERNAL MEDICINE

## 2025-05-16 PROCEDURE — 3074F SYST BP LT 130 MM HG: CPT | Performed by: INTERNAL MEDICINE

## 2025-05-16 PROCEDURE — 1036F TOBACCO NON-USER: CPT | Performed by: INTERNAL MEDICINE

## 2025-05-16 NOTE — TELEPHONE ENCOUNTER
DEON HERE FOR FOLLOW UP   Continue zanubrutinib   Rv in 3 months with cbc, cmp ldh prior to RV   MD VISIT: 8/22/25 @ 11:30AM   LABS PRINTED AND GIVEN ON EXIT   AVS PRINTED AND GIVEN ON EXIT

## 2025-05-16 NOTE — PROGRESS NOTES
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 male who is referred to us for management of CLL. He has history of CLL with diagnosis established 10/2020 via left cervical lymph node biopsy. He briefly followed with oncology, no progression and released. He feels well overall with no symptoms.   He recently had skin cancer removed on the left side of his face, squamous cell carcinoma, 07/2022 with small background CLL subcutaneous tissue.   He has history of cardiac arrest and hypertension, managed with medication. He has history of alcohol abuse, currently consumes 1-2 beers daily. He has history of smoking addiction and quit 03/01/2020.   The patient was followed and he did quite well for many years.  However, in 2025, he presented with progressive cervical adenopathy and progressive thrombocytopenia.  We felt that this is enough progression of his disease to warrant treatment and we will start him on Zanubrutinib.  INTERIM HISTORY  Patient comes in today for follow-up, he has been taking his ibrutinib for about 10 weeks.  He noticed a significant improvement in the cervical adenopathy.  He is tolerating the medicine quite well without any side effects    PAST MEDICAL HISTORY: has a past medical history of Arthritis, Back pain, CAD (coronary artery disease), Carotid artery stenosis, CLL (chronic lymphocytic leukemia) (HCC), Colon polyps, COPD (chronic obstructive pulmonary disease) (HCC), GERD (gastroesophageal reflux disease), History of heart artery stent, Hyperlipidemia, Hypertension, Ischemic cardiomyopathy, Left heart failure (HCC), Lymphoma (HCC), MI (myocardial infarction) (HCC), Wears dentures, and Wears eyeglasses.    PAST SURGICAL HISTORY: has a past surgical history that  Caryl Dalton MD  [facility]  Pager: [phone] / [place] [phone]     Patient is a 87y old  Male who presents with a chief complaint of abdominal pain.     SUBJECTIVE / OVERNIGHT EVENTS:  Patient seen and examined at bedside. No acute events overnight. Pt denies any N/V/D. Pt reports that his abdominal pain has resolved.     Other Review of Systems Negative.    MEDICATIONS  (STANDING):  cholecalciferol 1000 Unit(s) Oral daily  ciprofloxacin   IVPB 400 milliGRAM(s) IV Intermittent every 12 hours  heparin  Injectable 5000 Unit(s) SubCutaneous every 8 hours  hydroxychloroquine 200 milliGRAM(s) Oral two times a day  influenza   Vaccine 0.5 milliLiter(s) IntraMuscular once  metroNIDAZOLE  IVPB 500 milliGRAM(s) IV Intermittent every 8 hours  multivitamin 1 Tablet(s) Oral daily  pantoprazole    Tablet 40 milliGRAM(s) Oral before breakfast    MEDICATIONS  (PRN):      OBJECTIVE:    Vital Signs Last 24 Hrs  T(C): 36.3 (07 Oct 2019 05:51), Max: 36.4 (06 Oct 2019 15:14)  T(F): 97.4 (07 Oct 2019 05:51), Max: 97.6 (06 Oct 2019 20:55)  HR: 92 (07 Oct 2019 05:51) (92 - 101)  BP: 123/78 (07 Oct 2019 05:51) (113/73 - 123/78)  BP(mean): --  RR: 17 (07 Oct 2019 05:51) (17 - 18)  SpO2: 98% (07 Oct 2019 05:51) (98% - 98%)    I&O's Summary    05 Oct 2019 07:01  -  06 Oct 2019 07:00  --------------------------------------------------------  IN: 0 mL / OUT: 400 mL / NET: -400 mL    06 Oct 2019 07:01  -  07 Oct 2019 06:56  --------------------------------------------------------  IN: 0 mL / OUT: 1725 mL / NET: -1725 mL        PHYSICAL EXAM:   	GEN: Age appropriate, resting comfortably in bed, no acute distress, non toxic appearing, speaking in complete sentences.   	HEENT: Conjunctiva and sclera normal  	PULM: crackles bilaterally, louder at the bases  	CV: RRR, S1S2, no MRG  	MSK: no stiffness or joint effusions  	Abdominal: Soft, nontender to palpation, non-distended, +BS  	Extremities: No edema or cyanosis  	NEURO: AAOx3  	Psych: normal affect, normal behavior  	Skin: No rashes, lesions      LABS:                        9.1    10.55 )-----------( 405      ( 06 Oct 2019 05:30 )             27.5     Auto Eosinophil # 0.15  / Auto Eosinophil % 1.4   / Auto Neutrophil # 8.30  / Auto Neutrophil % 78.6  / BANDS % x                            10.8   17.81 )-----------( 487      ( 05 Oct 2019 14:55 )             32.7     Auto Eosinophil # 0.01  / Auto Eosinophil % 0.1   / Auto Neutrophil # 15.64 / Auto Neutrophil % 87.8  / BANDS % x        10-06    130<L>  |  95<L>  |  15  ----------------------------<  81  3.5   |  25  |  0.79  10-05    130<L>  |  89<L>  |  20  ----------------------------<  111<H>  4.6   |  26  |  0.85    Ca    8.8      06 Oct 2019 05:30  Mg     1.7     10-06  Phos  2.6     10-06  TPro  6.1  /  Alb  2.5<L>  /  TBili  0.2  /  DBili  x   /  AST  17  /  ALT  8   /  AlkPhos  64  10-06  TPro  7.7  /  Alb  3.2<L>  /  TBili  0.3  /  DBili  x   /  AST  21  /  ALT  10  /  AlkPhos  76  10-05    PT/INR - ( 05 Oct 2019 14:55 )   PT: 14.2 SEC;   INR: 1.24          PTT - ( 05 Oct 2019 14:55 )  PTT:31.7 SEC        VBG: ( 19:25 ) - VBG - pH: 7.40  | pCO2: 45    | pO2: 56    | Lactate: 1.3        EKG reviewed by me:  	Rate: 102  	Rhythm: sinus tachycardia  	Axis: LAD  	CO: 140  	QRS: 126, RBBB  	QTC: 518  	No Rylan/STd/TWI in two or more continguous leads    	CTAP:  	IMPRESSION:     	Moderate small bowel dilatation likely related to enteritis with   	differential diagnosis of incomplete or early small bowel obstruction.   	Suggest follow-up imaging to track passage of contrast.  Extensive pulmonary fibrosis      Care Discussed with Consultants/Other Providers: Yes. Surgery     RADIOLOGY & ADDITIONAL TESTS: Yes   (Imaging Personally Reviewed) Caryl Dalton MD  [facility] Medicine [facility]  Pager: [phone] / [employer] [phone]     Patient is a 87y old  Male who presents with a chief complaint of abdominal pain.     SUBJECTIVE / OVERNIGHT EVENTS:  Patient seen and examined at bedside. No acute events overnight. Pt denies any N/V/D. Pt reports that his abdominal pain has resolved. Denies any chest pain or dyspnea.     Other Review of Systems Negative.    MEDICATIONS  (STANDING):  cholecalciferol 1000 Unit(s) Oral daily  ciprofloxacin   IVPB 400 milliGRAM(s) IV Intermittent every 12 hours  heparin  Injectable 5000 Unit(s) SubCutaneous every 8 hours  hydroxychloroquine 200 milliGRAM(s) Oral two times a day  influenza   Vaccine 0.5 milliLiter(s) IntraMuscular once  metroNIDAZOLE  IVPB 500 milliGRAM(s) IV Intermittent every 8 hours  multivitamin 1 Tablet(s) Oral daily  pantoprazole    Tablet 40 milliGRAM(s) Oral before breakfast  potassium chloride    Tablet ER 20 milliEquivalent(s) Oral every 2 hours  potassium phosphate / sodium phosphate powder 1 Packet(s) Oral three times a day with meals    MEDICATIONS  (PRN):      OBJECTIVE:    Vital Signs Last 24 Hrs  T(C): 36.3 (07 Oct 2019 05:51), Max: 36.4 (06 Oct 2019 15:14)  T(F): 97.4 (07 Oct 2019 05:51), Max: 97.6 (06 Oct 2019 20:55)  HR: 92 (07 Oct 2019 05:51) (92 - 101)  BP: 123/78 (07 Oct 2019 05:51) (113/73 - 123/78)  BP(mean): --  RR: 17 (07 Oct 2019 05:51) (17 - 18)  SpO2: 98% (07 Oct 2019 05:51) (98% - 98%)    I&O's Summary    05 Oct 2019 07:01  -  06 Oct 2019 07:00  --------------------------------------------------------  IN: 0 mL / OUT: 400 mL / NET: -400 mL    06 Oct 2019 07:01  -  07 Oct 2019 06:56  --------------------------------------------------------  IN: 0 mL / OUT: 1725 mL / NET: -1725 mL        PHYSICAL EXAM:   	GEN: Age appropriate, resting comfortably in bed, no acute distress, non toxic appearing, speaking in complete sentences.   	HEENT: Conjunctiva and sclera normal  	PULM: crackles bilaterally, louder at the bases  	CV: RRR, S1S2, no MRG  	MSK: no stiffness or joint effusions  	Abdominal: Soft, nontender to palpation, non-distended, +BS  	Extremities: No edema or cyanosis  	NEURO: AAOx3  	Psych: normal affect, normal behavior  	Skin: No rashes, lesions      LABS:                                     9.8    9.44  )-----------( 435      ( 07 Oct 2019 06:41 )             29.8     10-07    131<L>  |  94<L>  |  9   ----------------------------<  95  3.3<L>   |  25  |  0.75    Ca    8.6      07 Oct 2019 06:41  Phos  2.0     10-07  Mg     1.6     10-07    TPro  6.1  /  Alb  2.5<L>  /  TBili  0.2  /  DBili  x   /  AST  17  /  ALT  8   /  AlkPhos  64  10-06      EKG reviewed by me:  	Rate: 102  	Rhythm: sinus tachycardia  	Axis: LAD  	OH: 140  	QRS: 126, RBBB  	QTC: 518  	No Rylan/STd/TWI in two or more continguous leads    	CTAP:  	IMPRESSION:     	Moderate small bowel dilatation likely related to enteritis with   	differential diagnosis of incomplete or early small bowel obstruction.   	Suggest follow-up imaging to track passage of contrast.  Extensive pulmonary fibrosis      Care Discussed with Consultants/Other Providers: Yes. Surgery     RADIOLOGY & ADDITIONAL TESTS: Yes   (Imaging Personally Reviewed)

## 2025-05-23 ENCOUNTER — CARE COORDINATION (OUTPATIENT)
Dept: CARE COORDINATION | Age: 75
End: 2025-05-23

## 2025-05-23 ASSESSMENT — SOCIAL DETERMINANTS OF HEALTH (SDOH)
WITHIN THE LAST YEAR, HAVE TO BEEN RAPED OR FORCED TO HAVE ANY KIND OF SEXUAL ACTIVITY BY YOUR PARTNER OR EX-PARTNER?: NO
IN A TYPICAL WEEK, HOW MANY TIMES DO YOU TALK ON THE PHONE WITH FAMILY, FRIENDS, OR NEIGHBORS?: MORE THAN THREE TIMES A WEEK
HOW OFTEN DO YOU ATTENT MEETINGS OF THE CLUB OR ORGANIZATION YOU BELONG TO?: NEVER
WITHIN THE LAST YEAR, HAVE YOU BEEN KICKED, HIT, SLAPPED, OR OTHERWISE PHYSICALLY HURT BY YOUR PARTNER OR EX-PARTNER?: NO
WITHIN THE LAST YEAR, HAVE YOU BEEN AFRAID OF YOUR PARTNER OR EX-PARTNER?: NO
WITHIN THE LAST YEAR, HAVE YOU BEEN HUMILIATED OR EMOTIONALLY ABUSED IN OTHER WAYS BY YOUR PARTNER OR EX-PARTNER?: NO
DO YOU BELONG TO ANY CLUBS OR ORGANIZATIONS SUCH AS CHURCH GROUPS UNIONS, FRATERNAL OR ATHLETIC GROUPS, OR SCHOOL GROUPS?: NO
HOW OFTEN DO YOU ATTEND CHURCH OR RELIGIOUS SERVICES?: NEVER

## 2025-05-27 ENCOUNTER — OFFICE VISIT (OUTPATIENT)
Dept: FAMILY MEDICINE CLINIC | Age: 75
End: 2025-05-27
Payer: MEDICARE

## 2025-05-27 VITALS
DIASTOLIC BLOOD PRESSURE: 42 MMHG | TEMPERATURE: 97.2 F | HEART RATE: 50 BPM | BODY MASS INDEX: 29.56 KG/M2 | WEIGHT: 206 LBS | SYSTOLIC BLOOD PRESSURE: 118 MMHG

## 2025-05-27 DIAGNOSIS — R20.0 NUMBNESS AND TINGLING OF BOTH LOWER EXTREMITIES: ICD-10-CM

## 2025-05-27 DIAGNOSIS — R23.8 REDNESS AND SWELLING OF LOWER LEG: Primary | ICD-10-CM

## 2025-05-27 DIAGNOSIS — R20.9 BILATERAL COLD FEET: ICD-10-CM

## 2025-05-27 DIAGNOSIS — R20.2 NUMBNESS AND TINGLING OF BOTH LOWER EXTREMITIES: ICD-10-CM

## 2025-05-27 DIAGNOSIS — M79.89 REDNESS AND SWELLING OF LOWER LEG: Primary | ICD-10-CM

## 2025-05-27 PROCEDURE — 1160F RVW MEDS BY RX/DR IN RCRD: CPT | Performed by: NURSE PRACTITIONER

## 2025-05-27 PROCEDURE — 99213 OFFICE O/P EST LOW 20 MIN: CPT | Performed by: NURSE PRACTITIONER

## 2025-05-27 PROCEDURE — G8417 CALC BMI ABV UP PARAM F/U: HCPCS | Performed by: NURSE PRACTITIONER

## 2025-05-27 PROCEDURE — 1036F TOBACCO NON-USER: CPT | Performed by: NURSE PRACTITIONER

## 2025-05-27 PROCEDURE — 1123F ACP DISCUSS/DSCN MKR DOCD: CPT | Performed by: NURSE PRACTITIONER

## 2025-05-27 PROCEDURE — G8427 DOCREV CUR MEDS BY ELIG CLIN: HCPCS | Performed by: NURSE PRACTITIONER

## 2025-05-27 PROCEDURE — 3017F COLORECTAL CA SCREEN DOC REV: CPT | Performed by: NURSE PRACTITIONER

## 2025-05-27 PROCEDURE — 3074F SYST BP LT 130 MM HG: CPT | Performed by: NURSE PRACTITIONER

## 2025-05-27 PROCEDURE — 1159F MED LIST DOCD IN RCRD: CPT | Performed by: NURSE PRACTITIONER

## 2025-05-27 PROCEDURE — 3078F DIAST BP <80 MM HG: CPT | Performed by: NURSE PRACTITIONER

## 2025-05-27 ASSESSMENT — ENCOUNTER SYMPTOMS
SHORTNESS OF BREATH: 0
ABDOMINAL PAIN: 0
EYES NEGATIVE: 1
COUGH: 0
RESPIRATORY NEGATIVE: 1
ALLERGIC/IMMUNOLOGIC NEGATIVE: 1
GASTROINTESTINAL NEGATIVE: 1
CHEST TIGHTNESS: 0

## 2025-05-27 NOTE — PROGRESS NOTES
[facility] [place]     Date of Visit:  2025  Patient Name: Mina Goldberg   Patient :  1950     CHIEF COMPLAINT:     Mina Goldberg is a 75 y.o. male who presents today for an general visit to be evaluated for the following condition(s):  Chief Complaint   Patient presents with    Leg Pain     Right leg pain with redness        HISTORY OF PRESENT ILLNESS:       HPI     Here today for bilateral lower leg and feet redness, numbness, and tingling. Reports the discoloration has been present for the past few months.  He has noticed an increase of numbness and tingling in bilateral lower extremities over the past week or so, but thinks he is just more aware at this time.  Denies use of compression stockings at this time.  Reports his feet always feel cold and the right foot is worse than the left foot.     REVIEW OF SYSTEMS:      Review of Systems   Constitutional: Negative.  Negative for fatigue and fever.   HENT: Negative.  Negative for ear pain and tinnitus.    Eyes: Negative.  Negative for visual disturbance.   Respiratory: Negative.  Negative for cough, chest tightness and shortness of breath.    Cardiovascular: Negative.  Negative for chest pain.   Gastrointestinal: Negative.  Negative for abdominal pain.   Endocrine: Negative.    Genitourinary: Negative.  Negative for dysuria, frequency and urgency.   Musculoskeletal:  Negative for arthralgias and myalgias.   Skin: Negative.    Allergic/Immunologic: Negative.    Neurological:  Positive for numbness (bilateral lower extremitites.). Negative for dizziness and light-headedness.   Hematological: Negative.    Psychiatric/Behavioral: Negative.  Negative for behavioral problems and hallucinations.         REVIEWED INFORMATION      Current Outpatient Medications   Medication Sig Dispense Refill    Zanubrutinib 80 MG CAPS Take 160 mg by mouth in the morning and at bedtime 120 capsule 3    nitroGLYCERIN (NITROSTAT) 0.4 MG SL tablet Place 1 tablet under the

## 2025-06-02 ENCOUNTER — CARE COORDINATION (OUTPATIENT)
Dept: CARE COORDINATION | Age: 75
End: 2025-06-02

## 2025-06-02 NOTE — CARE COORDINATION
Ambulatory Care Coordination Note     2025 1:02 PM     Patient Current Location:  Home: [address] [address]     This patient was received as a referral from Care Transition Nurse.    ACM contacted the patient by telephone. Verified name and  with patient as identifiers. Provided introduction to self, and explanation of the ACM role.   Patient accepted care management services at this time.          ACM: Christine Ashby RN     Challenges to be reviewed by the provider   Additional needs identified to be addressed with provider No                  Method of communication with provider: none.    Utilization: Initial Call - N/A    Care Summary Note: CC outreach, chronic conditions and wellbeing.  Following up with right leg pain, continues with symptoms, and does have appointment with vascular specialist on 2025  Will follow up with CC outreach following that appointment  Declines to schedule with PCP at this time    Offered patient enrollment in the Remote Patient Monitoring (RPM) program for in-home monitoring: Yes, but did not enroll at this time: already monitoring with home equipment.     Assessments Completed:       2025    10:19 AM   Amb Fall Risk Assessment and TUG Test   Do you feel unsteady or are you worried about falling?  no   2 or more falls in past year? no   Fall with injury in past year? no    ,   Ambulatory Care Coordination Assessment    Care Coordination Protocol  Referral from Primary Care Provider: No  Week 1 - Initial Assessment     Do you have all of your prescriptions and are they filled?: No (Comment: no new scripts , continues same meds as he has been before)  Barriers to medication adherence: None  Are you able to afford your medications?: No  How often do you have trouble taking your medications the way you have been told to take them?: I always take them as prescribed.     Do you have Home O2 Therapy?: No      Ability to seek help/take action for Emergent

## 2025-06-04 ENCOUNTER — INITIAL CONSULT (OUTPATIENT)
Age: 75
End: 2025-06-04
Payer: MEDICARE

## 2025-06-04 VITALS
HEART RATE: 55 BPM | DIASTOLIC BLOOD PRESSURE: 58 MMHG | HEIGHT: 70 IN | BODY MASS INDEX: 29.49 KG/M2 | RESPIRATION RATE: 18 BRPM | SYSTOLIC BLOOD PRESSURE: 100 MMHG | OXYGEN SATURATION: 98 % | WEIGHT: 206 LBS

## 2025-06-04 DIAGNOSIS — I70.221 ATHEROSCLEROSIS OF NATIVE ARTERIES OF EXTREMITIES WITH REST PAIN, RIGHT LEG (HCC): Primary | ICD-10-CM

## 2025-06-04 PROCEDURE — 1036F TOBACCO NON-USER: CPT | Performed by: SURGERY

## 2025-06-04 PROCEDURE — 99203 OFFICE O/P NEW LOW 30 MIN: CPT | Performed by: SURGERY

## 2025-06-04 PROCEDURE — 1159F MED LIST DOCD IN RCRD: CPT | Performed by: SURGERY

## 2025-06-04 PROCEDURE — 1123F ACP DISCUSS/DSCN MKR DOCD: CPT | Performed by: SURGERY

## 2025-06-04 PROCEDURE — G8427 DOCREV CUR MEDS BY ELIG CLIN: HCPCS | Performed by: SURGERY

## 2025-06-04 PROCEDURE — 3017F COLORECTAL CA SCREEN DOC REV: CPT | Performed by: SURGERY

## 2025-06-04 PROCEDURE — 1126F AMNT PAIN NOTED NONE PRSNT: CPT | Performed by: SURGERY

## 2025-06-04 PROCEDURE — G8417 CALC BMI ABV UP PARAM F/U: HCPCS | Performed by: SURGERY

## 2025-06-04 PROCEDURE — 3078F DIAST BP <80 MM HG: CPT | Performed by: SURGERY

## 2025-06-04 PROCEDURE — 3074F SYST BP LT 130 MM HG: CPT | Performed by: SURGERY

## 2025-06-04 RX ORDER — OMEPRAZOLE 20 MG/1
20 CAPSULE, DELAYED RELEASE ORAL DAILY
Qty: 90 CAPSULE | Refills: 3 | Status: SHIPPED | OUTPATIENT
Start: 2025-06-04

## 2025-06-04 NOTE — H&P (VIEW-ONLY)
conjunctiva clear  Ears - hearing appears to be intact  Nose - no drainage noted  Mouth - mucous membranes moist  Neck - supple, no carotid bruits, thyroid not palpable, no JVD  Chest - clear to auscultation, normal effort  Heart - normal rate, regular rhythm, no murmurs  Abdomen - soft, non-tender, non-distended, bowel sounds present all four quadrants, no masses, hepatomegaly, splenomegaly or aortic enlargement  Neurological - normal speech, no focal findings or movement disorder noted, cranial nerves II through XII grossly intact  Extremities -monophasic Doppler signal with dependent rubor.  Skin - no gross lesions, rashes, or induration noted      R brachial 2+ L brachial 2+   R radial 2+ L radial 2+   R femoral 2+ L femoral 2+   R popliteal 0+ L popliteal 0+   R posterior tibial Doppler + L posterior tibial Doppler +   R dorsalis pedis Doppler + L dorsalis pedis Doppler +     Labs:   Lab Results   Component Value Date/Time    WBC 26.97 05/08/2025 11:38 AM    HGB 12.4 05/08/2025 11:38 AM    HCT 38.7 05/08/2025 11:38 AM    MCV 97.2 05/08/2025 11:38 AM    PLT 65 05/08/2025 11:38 AM     12/04/2011 01:13 PM     Lab Results   Component Value Date/Time     05/08/2025 11:38 AM    K 4.5 05/08/2025 11:38 AM     05/08/2025 11:38 AM    CO2 27 05/08/2025 11:38 AM    BUN 14 05/08/2025 11:38 AM    CREATININE 0.87 05/08/2025 11:38 AM    GLUCOSE 110 05/08/2025 11:38 AM    CALCIUM 9.0 05/08/2025 11:38 AM     Lab Results   Component Value Date/Time    ALKPHOS 59 05/08/2025 11:38 AM    ALKPHOS 81 04/19/2024 09:45 AM    ALT 20 05/08/2025 11:38 AM    AST 23 05/08/2025 11:38 AM    BILITOT 1.1 05/08/2025 11:38 AM     No results found for: \"LACTA\"  No results found for: \"AMYLASE\"  No results found for: \"LIPASE\"  Lab Results   Component Value Date/Time    INR 1.0 03/04/2021 10:04 AM         Assessment:  75 y.o.male with ischemic rest pain of the right leg    1. Atherosclerosis of native arteries of extremities with  rest pain, right leg (HCC)        Plan:   He will be scheduled for right leg arteriogram with intervention in the upcoming weeks.    No orders of the defined types were placed in this encounter.          Electronically signed by Arthur Delos Reyes, MD on 6/4/2025 at 8:33 AM     Copy sent to Fede Mendez MD

## 2025-06-04 NOTE — TELEPHONE ENCOUNTER
Mina Goldberg is calling to request a refill on the following medication(s):    Medication Request:  Requested Prescriptions     Pending Prescriptions Disp Refills    omeprazole (PRILOSEC) 20 MG delayed release capsule [Pharmacy Med Name: Omeprazole Oral Capsule Delayed Release 20 MG] 90 capsule 3     Sig: TAKE 1 CAPSULE EVERY DAY       Last Visit Date (If Applicable):  2/17/2025    Next Visit Date:    9/15/2025

## 2025-06-04 NOTE — PROGRESS NOTES
ENDARTERECTOMY Left 4/16/2014    CAROTID ENDARTERECTOMY Left 10/26/2020    with Lymph node biopsy fro left neck    CAROTID ENDARTERECTOMY Left 10/26/2020    REDO LEFT CAROTID ENDARTERECTOMY WITH LEFT CERVICAL LYMPH NODE BIOPSY performed by Otto Rhodes MD at [facility] OR    CAROTID ENDARTERECTOMY Right 3/10/2021    RIGHT CAROTID ENDARTERECTOMY performed by Otto Rhodes MD at [facility] OR    COLONOSCOPY      Polypectomy per pt.    CORONARY ANGIOPLASTY WITH STENT PLACEMENT      stent x1    EYE SURGERY Bilateral     Foreign body removal    FINGER AMPUTATION Left     ring finger    HYDROCELE EXCISION Left     LUMBAR DISC SURGERY      L5-S1    LYMPH NODE DISSECTION      right side of neck    PRE-MALIGNANT / BENIGN SKIN LESION EXCISION  07/08/2022    LEFT FACIAL LESION EXCISION WITH COMPLEX CLOSURE AND FROZEN SECTION *    PRE-MALIGNANT / BENIGN SKIN LESION EXCISION Left 7/8/2022    LEFT FACIAL LESION EXCISION WITH COMPLEX CLOSURE AND FROZEN SECTION * PATHOLOGY REQUIRED performed by Mendy Robles MD at [facility] OR    RECTAL SURGERY      fissure    TOE SURGERY Left        Social History:  reports that he quit smoking about 5 years ago. His smoking use included cigarettes. He started smoking about 29 years ago. He has a 12 pack-year smoking history. He has never used smokeless tobacco. He reports current alcohol use of about 4.0 standard drinks of alcohol per week. He reports that he does not use drugs.    Family History: family history includes Other in his brother and mother; Stroke in his father.    Review of Systems:   Constitutional:  negative for  fevers, chills, sweats, fatigue, and weight loss  HEENT:  negative for vision or hearing changes,   Respiratory:  negative for shortness of breath, cough, or congestion  Cardiovascular:  negative for  chest pain, palpitations  Gastrointestinal:  negative for nausea, vomiting, diarrhea, constipation, abdominal pain  Genitourinary:  negative for frequency,

## 2025-06-05 ENCOUNTER — TELEPHONE (OUTPATIENT)
Dept: VASCULAR SURGERY | Age: 75
End: 2025-06-05

## 2025-06-05 NOTE — TELEPHONE ENCOUNTER
----- Message from EDWIN POLLACK MA sent at 6/5/2025  9:13 AM EDT -----  Regarding: FW: Schedule right leg angio  [facility] 8:00 am, arrive 7:00 am.  Tried to call patient, no answer and no voicemail  ----- Message -----  From: Scarlet Ram MA  Sent: 6/4/2025   9:06 AM EDT  To: Scarlet Rma MA  Subject: FW: Schedule right leg angio                       ----- Message -----  From: Delos Reyes, Arthur, MD  Sent: 6/4/2025   8:34 AM EDT  To: [facility]  Subject: Schedule right leg angio                         Diagnosis: Atherosclerosis native arteries with rest pain  Procedure: Right leg arteriogram with intervention  Location: [facility]  Anesthesia: Sedation  Procedure time: 1.5-hour   Subjective:  pt seen and examined , no complaints,     epoetin shell Injectable 98164Blck(s) IV Push <User Schedule>  pantoprazole    Tablet 40milliGRAM(s) Oral before breakfast  ATENolol  Tablet 25milliGRAM(s) Oral daily  folic acid 1milliGRAM(s) Oral daily  cilostazol 50milliGRAM(s) Oral daily  guaiFENesin    Syrup 100milliGRAM(s) Oral every 6 hours PRN  benzocaine 15 mG/menthol 3.6 mG Lozenge 1Lozenge Oral five times a day PRN  insulin lispro (HumaLOG) corrective regimen sliding scale  SubCutaneous three times a day before meals  dextrose 5%. 1000milliLiter(s) IV Continuous <Continuous>  dextrose Gel 1Dose(s) Oral once PRN  dextrose 50% Injectable 12.5Gram(s) IV Push once  dextrose 50% Injectable 25Gram(s) IV Push once  dextrose 50% Injectable 25Gram(s) IV Push once  glucagon  Injectable 1milliGRAM(s) IntraMuscular once PRN  acetaminophen   Tablet 650milliGRAM(s) Oral every 6 hours PRN  cefTRIAXone   IVPB 1Gram(s) IV Intermittent every 24 hours  cefTRIAXone   IVPB  IV Intermittent                             7.6    4.9   )-----------( 192      ( 2017 18:14 )             23.4       Hemoglobin: 7.6 g/dL ( @ 18:14)  Hemoglobin: 9.6 g/dL ( @ 11:10)  Hemoglobin: 8.6 g/dL ( @ 19:35)  Hemoglobin: 9.7 g/dL ( @ 07:14)  Hemoglobin: 7.6 g/dL ( @ 06:20)          133<L>  |  100  |  28<H>  ----------------------------<  121<H>  4.3   |  25  |  7.12<H>    Ca    8.4      2017 11:10  Phos  3.1       Mg     2.0         TPro  7.2  /  Alb  1.8<L>  /  TBili  0.3  /  DBili  x   /  AST  40  /  ALT  27  /  AlkPhos  135<H>      Creatinine Trend: 7.12<--, 6.22<--, 5.58<--, 3.91<--, 4.91<--    COAGS:     CARDIAC MARKERS ( 2017 19:35 )  <0.015 ng/mL / x     / 16 U/L / x     / <1.0 ng/mL        T(C): 36.8, Max: 37.2 (06-20 @ 00:35)  HR: 56 (16 - 113)  BP: 102/66 (80/56 - 109/54)  RR: 16 (16 - 18)  SpO2: 96% (93% - 100%)  Wt(kg): --    I&O's Summary    I & Os for current day (as of 2017 05:48)  =============================================  IN: 500 ml / OUT: 25 ml / NET: 475 ml      Daily     Daily Weight in k (2017 18:00)    Appearance: Normal	  HEENT:   Normal oral mucosa, PERRL, EOMI	  Lymphatic: No lymphadenopathy , no edema  Cardiovascular: Normal S1 S2, No JVD, No murmurs , Peripheral pulses palpable 2+ bilaterally  Respiratory: Lungs clear to auscultation, normal effort 	  Gastrointestinal:  Soft, Non-tender, + BS	  Skin: No rashes, No ecchymoses, No cyanosis, warm to touch  Musculoskeletal: Normal range of motion, normal strength  Psychiatry:  Mood & affect appropriate    TELEMETRY: 	  off     DIAGNOSTIC TESTING:  [x ] Echocardiogram:   ------------------------------------------------------------------------  CONCLUSIONS:  1. Mild concentric left ventricular hypertrophy.  2. Normal Left Ventricular Systolic Function,  (EF = 55 to  60%)  3. Grade II diastolic dysfunction  4. RV systolic pressure is mildly increased.=35mmhg  5. There is mild tricuspid regurgitation.    ------------------------------------------------------------------------  Confirmed on  2017 - 12:25:51 by Pancho Dubois MD  ------------------------------------------------------------------------  [ ]  Catheterization:  [ ] Stress Test:    OTHER:     CT abd/ Pelvis:  IMPRESSION:     Acute appearing thrombus within the superior mesenteric vein.    Esophageal stent withinthe distal esophagus/proximal stomach. Debris is   noted within the stent. No bowel obstruction.    Trace bilateral pleural effusions, right greater than left, with pleural   thickening/enhancement which may be infectious or neoplastic in etiology.    Distended gallbladder with cholelithiasis. No wall thickening or   pericholecystic fluid. Correlate clinically and with ultrasound if there   is concern for cholecystitis.    Additional findings as above.    MARY MONTANO M.D., ATTENDING RADIOLOGIST  This document has been electronically signed. 2017  1:46PM	        ASSESSMENT/PLAN: 	74y Male ALZ dementia, suprapubic catheter ESRD on HD, HTN, DM, Chol, esophageal stent,  afib on coumadin admitted with GI bleed  and rapid afib. . s/p PRBC    cont pletal  hold Systemic A/C due to anemia   B-blocker- rate controlled   HD per renal   Echo noted- Nl LV fx - DD II.   GI / DVT prophylaxis.   keep K>4, mag >2.0  no further cardiac work up needed  ABX per 1 team    D/W Dr Talavera

## 2025-06-06 NOTE — TELEPHONE ENCOUNTER
Called patient, no answer and no voicemail.  Called and left message for daughter to call    O-T Advancement Flap Text: The defect edges were debeveled with a #15 scalpel blade.  Given the location of the defect, shape of the defect and the proximity to free margins an O-T advancement flap was deemed most appropriate.  Using a sterile surgical marker, an appropriate advancement flap was drawn incorporating the defect and placing the expected incisions within the relaxed skin tension lines where possible.    The area thus outlined was incised deep to adipose tissue with a #15 scalpel blade.  The skin margins were undermined to an appropriate distance in all directions utilizing iris scissors.

## 2025-06-10 ENCOUNTER — CARE COORDINATION (OUTPATIENT)
Dept: CARE COORDINATION | Age: 75
End: 2025-06-10

## 2025-06-10 NOTE — CARE COORDINATION
Ambulatory Care Coordination Note     6/10/2025 12:31 PM     Patient outreach attempt by this ACM today to perform care management follow up . ACM was unable to reach the patient by telephone today;   left voice message requesting a return phone call to this ACM.     ACM: Christine Ashby RN     Care Summary Note: Follow up with CC updates, chronic conditions management and wellbeing.     PCP/Specialist follow up:   Future Appointments         Provider Specialty Dept Phone    8/22/2025 11:30 AM Derick Raygoza MD Oncology [phone]    9/15/2025 11:00 AM Fede Her MD Family Medicine [phone]            Follow Up:   Plan for next ACM outreach in approximately 1 week to complete:  - disease specific assessments  - goal progression  - education .

## 2025-06-13 ENCOUNTER — HOSPITAL ENCOUNTER (OUTPATIENT)
Age: 75
Setting detail: OUTPATIENT SURGERY
Discharge: HOME | End: 2025-06-13
Attending: SURGERY | Admitting: SURGERY
Payer: MEDICARE

## 2025-06-13 VITALS
WEIGHT: 197.3 LBS | OXYGEN SATURATION: 96 % | HEART RATE: 45 BPM | HEIGHT: 70 IN | RESPIRATION RATE: 13 BRPM | DIASTOLIC BLOOD PRESSURE: 40 MMHG | BODY MASS INDEX: 28.24 KG/M2 | SYSTOLIC BLOOD PRESSURE: 107 MMHG | TEMPERATURE: 97.7 F

## 2025-06-13 DIAGNOSIS — I73.9 PAD (PERIPHERAL ARTERY DISEASE): ICD-10-CM

## 2025-06-13 LAB
ANION GAP SERPL CALCULATED.3IONS-SCNC: 11 MMOL/L (ref 9–16)
BUN SERPL-MCNC: 17 MG/DL (ref 8–23)
CALCIUM SERPL-MCNC: 9 MG/DL (ref 8.8–10.2)
CHLORIDE SERPL-SCNC: 105 MMOL/L (ref 98–107)
CO2 SERPL-SCNC: 24 MMOL/L (ref 20–31)
CREAT SERPL-MCNC: 1 MG/DL (ref 0.7–1.2)
ECHO BSA: 2.1 M2
ERYTHROCYTE [DISTWIDTH] IN BLOOD BY AUTOMATED COUNT: 13.6 % (ref 11.8–14.4)
GFR, ESTIMATED: 78 ML/MIN/1.73M2
GLUCOSE SERPL-MCNC: 113 MG/DL (ref 82–115)
HCT VFR BLD AUTO: 37.8 % (ref 40.7–50.3)
HGB BLD-MCNC: 12.3 G/DL (ref 13–17)
MCH RBC QN AUTO: 31.5 PG (ref 25.2–33.5)
MCHC RBC AUTO-ENTMCNC: 32.5 G/DL (ref 28.4–34.8)
MCV RBC AUTO: 96.9 FL (ref 82.6–102.9)
NRBC BLD-RTO: 0 PER 100 WBC
PLATELET # BLD AUTO: 68 K/UL (ref 138–453)
PMV BLD AUTO: 11.1 FL (ref 8.1–13.5)
POTASSIUM SERPL-SCNC: 4.4 MMOL/L (ref 3.7–5.3)
RBC # BLD AUTO: 3.9 M/UL (ref 4.21–5.77)
SODIUM SERPL-SCNC: 141 MMOL/L (ref 136–145)
WBC OTHER # BLD: 19.7 K/UL (ref 3.5–11.3)

## 2025-06-13 PROCEDURE — 85027 COMPLETE CBC AUTOMATED: CPT

## 2025-06-13 PROCEDURE — 6370000000 HC RX 637 (ALT 250 FOR IP): Performed by: SURGERY

## 2025-06-13 PROCEDURE — 6360000004 HC RX CONTRAST MEDICATION: Performed by: SURGERY

## 2025-06-13 PROCEDURE — 37228 HC TIB PER TERRITORY PLASTY: CPT | Performed by: SURGERY

## 2025-06-13 PROCEDURE — 37224 HC FEM POP TERRITORY PLASTY: CPT | Performed by: SURGERY

## 2025-06-13 PROCEDURE — 2709999900 HC NON-CHARGEABLE SUPPLY: Performed by: SURGERY

## 2025-06-13 PROCEDURE — C1724 CATH, TRANS ATHEREC,ROTATION: HCPCS | Performed by: SURGERY

## 2025-06-13 PROCEDURE — 99153 MOD SED SAME PHYS/QHP EA: CPT | Performed by: SURGERY

## 2025-06-13 PROCEDURE — C1760 CLOSURE DEV, VASC: HCPCS | Performed by: SURGERY

## 2025-06-13 PROCEDURE — C2623 CATH, TRANSLUMIN, DRUG-COAT: HCPCS | Performed by: SURGERY

## 2025-06-13 PROCEDURE — C1769 GUIDE WIRE: HCPCS | Performed by: SURGERY

## 2025-06-13 PROCEDURE — 99152 MOD SED SAME PHYS/QHP 5/>YRS: CPT | Performed by: SURGERY

## 2025-06-13 PROCEDURE — C1887 CATHETER, GUIDING: HCPCS | Performed by: SURGERY

## 2025-06-13 PROCEDURE — 80048 BASIC METABOLIC PNL TOTAL CA: CPT

## 2025-06-13 PROCEDURE — 37225 HC FEM POP TERRITORY ATHERECTOMY: CPT | Performed by: SURGERY

## 2025-06-13 PROCEDURE — 2580000003 HC RX 258: Performed by: SURGERY

## 2025-06-13 PROCEDURE — 7100000010 HC PHASE II RECOVERY - FIRST 15 MIN: Performed by: SURGERY

## 2025-06-13 PROCEDURE — 7100000011 HC PHASE II RECOVERY - ADDTL 15 MIN: Performed by: SURGERY

## 2025-06-13 PROCEDURE — 6360000002 HC RX W HCPCS: Performed by: SURGERY

## 2025-06-13 PROCEDURE — C1725 CATH, TRANSLUMIN NON-LASER: HCPCS | Performed by: SURGERY

## 2025-06-13 PROCEDURE — C1894 INTRO/SHEATH, NON-LASER: HCPCS | Performed by: SURGERY

## 2025-06-13 PROCEDURE — 75630 X-RAY AORTA LEG ARTERIES: CPT | Performed by: SURGERY

## 2025-06-13 RX ORDER — CLOPIDOGREL 300 MG/1
300 TABLET, FILM COATED ORAL ONCE
Status: COMPLETED | OUTPATIENT
Start: 2025-06-13 | End: 2025-06-13

## 2025-06-13 RX ORDER — IODIXANOL 320 MG/ML
INJECTION, SOLUTION INTRAVASCULAR PRN
Status: DISCONTINUED | OUTPATIENT
Start: 2025-06-13 | End: 2025-06-13 | Stop reason: HOSPADM

## 2025-06-13 RX ORDER — SODIUM CHLORIDE 9 MG/ML
INJECTION, SOLUTION INTRAVENOUS CONTINUOUS
Status: DISCONTINUED | OUTPATIENT
Start: 2025-06-13 | End: 2025-06-13 | Stop reason: HOSPADM

## 2025-06-13 RX ORDER — MIDAZOLAM HYDROCHLORIDE 1 MG/ML
INJECTION, SOLUTION INTRAMUSCULAR; INTRAVENOUS PRN
Status: DISCONTINUED | OUTPATIENT
Start: 2025-06-13 | End: 2025-06-13 | Stop reason: HOSPADM

## 2025-06-13 RX ORDER — HEPARIN SODIUM 1000 [USP'U]/ML
INJECTION, SOLUTION INTRAVENOUS; SUBCUTANEOUS PRN
Status: DISCONTINUED | OUTPATIENT
Start: 2025-06-13 | End: 2025-06-13 | Stop reason: HOSPADM

## 2025-06-13 RX ORDER — FENTANYL CITRATE 50 UG/ML
INJECTION, SOLUTION INTRAMUSCULAR; INTRAVENOUS PRN
Status: DISCONTINUED | OUTPATIENT
Start: 2025-06-13 | End: 2025-06-13 | Stop reason: HOSPADM

## 2025-06-13 RX ORDER — CLOPIDOGREL BISULFATE 75 MG/1
75 TABLET ORAL DAILY
Status: DISCONTINUED | OUTPATIENT
Start: 2025-06-14 | End: 2025-06-13 | Stop reason: HOSPADM

## 2025-06-13 RX ORDER — CLOPIDOGREL BISULFATE 75 MG/1
75 TABLET ORAL DAILY
Qty: 90 TABLET | Refills: 3 | Status: SHIPPED | OUTPATIENT
Start: 2025-06-13

## 2025-06-13 RX ORDER — SODIUM CHLORIDE 9 MG/ML
INJECTION, SOLUTION INTRAVENOUS PRN
Status: DISCONTINUED | OUTPATIENT
Start: 2025-06-13 | End: 2025-06-13 | Stop reason: HOSPADM

## 2025-06-13 RX ADMIN — SODIUM CHLORIDE: 0.9 INJECTION, SOLUTION INTRAVENOUS at 07:23

## 2025-06-13 RX ADMIN — CLOPIDOGREL BISULFATE 300 MG: 300 TABLET, FILM COATED ORAL at 09:37

## 2025-06-13 ASSESSMENT — PAIN SCALES - GENERAL: PAINLEVEL_OUTOF10: 0

## 2025-06-13 ASSESSMENT — PAIN - FUNCTIONAL ASSESSMENT: PAIN_FUNCTIONAL_ASSESSMENT: 0-10

## 2025-06-13 NOTE — INTERVAL H&P NOTE
Update History & Physical    The patient's History and Physical of June 4, 2025 was reviewed with the patient and I examined the patient. There was no change. The surgical site was confirmed by the patient and me.     Plan: The risks, benefits, expected outcome, and alternative to the recommended procedure have been discussed with the patient. Patient understands and wants to proceed with the procedure.     Electronically signed by Arthur Delos Reyes, MD on 6/13/2025 at 7:43 AM

## 2025-06-13 NOTE — H&P
Interval H&P Note    Pt Name: Mina Goldberg  MRN: [id number]  YOB: 1950  Date of evaluation: 6/13/2025      [x] I have reviewed THE VASCULAR SURGERY CONSULTATION   by DR.DELOS REYES  dated 6/04/2025 WHICH MEETS CRITERIA FOR AN  for an Interval History and Physical note.COPIED BELOW.     [x] I have examined  Mina Goldberg, a 75 y.o. male.There are no changes to the patient who is scheduled for Angiography lower ext right by Delos Reyes, Arthur, MD for PAD (peripheral artery disease).     The patient denies new health changes, fever, chills, wheezing, cough, increased SOB, chest pain, open sores or wounds.HE DOES NOT TAKE BLOOD THINNERS.HE DOES NOT HAVE DIABETES.    Vital signs: BP (!) 102/48   Pulse 54   Temp 96.8 °F (36 °C)   Resp 16   Ht 1.778 m (5' 10\")   Wt 89.5 kg (197 lb 4.8 oz)   SpO2 94%   BMI 28.31 kg/m²     Allergies:  Patient has no known allergies.    Medications:    Prior to Admission medications    Medication Sig Start Date End Date Taking? Authorizing Provider   omeprazole (PRILOSEC) 20 MG delayed release capsule TAKE 1 CAPSULE EVERY DAY 6/4/25  Yes Fede Her MD   Zanubrutinib 80 MG CAPS Take 160 mg by mouth in the morning and at bedtime 5/16/25  Yes Derick Raygoza MD   fluticasone (FLONASE) 50 MCG/ACT nasal spray USE 2 SPRAYS IN EACH NOSTRIL EVERY DAY 3/28/25  Yes Fede Her MD   sacubitril-valsartan (ENTRESTO) 24-26 MG per tablet Take 1 tablet by mouth 2 times daily 3/28/25  Yes Alton Schaefer MD   aspirin 81 MG EC tablet Take 1 tablet by mouth daily Pt to take while on Brukinsa in replacement of Plavix. 3/24/25  Yes Alton Schaefer MD   atorvastatin (LIPITOR) 80 MG tablet Take 1 tablet by mouth daily 9/27/24  Yes Alton Schaefer MD   dapagliflozin (FARXIGA) 10 MG tablet Take 1 tablet by mouth daily 9/27/24  Yes Alton Schaefer MD   metoprolol succinate (TOPROL XL) 25 MG extended release tablet Take 1 tablet by mouth daily 9/27/24  Yes Olive,  Left 7/8/2022    LEFT FACIAL LESION EXCISION WITH COMPLEX CLOSURE AND FROZEN SECTION * PATHOLOGY REQUIRED performed by Mendy Robles MD at [facility] OR    RECTAL SURGERY      fissure    TOE SURGERY Left        Social History:  reports that he quit smoking about 5 years ago. His smoking use included cigarettes. He started smoking about 29 years ago. He has a 12 pack-year smoking history. He has never used smokeless tobacco. He reports current alcohol use of about 4.0 standard drinks of alcohol per week. He reports that he does not use drugs.    Family History: family history includes Other in his brother and mother; Stroke in his father.    Review of Systems:   Constitutional:  negative for  fevers, chills, sweats, fatigue, and weight loss  HEENT:  negative for vision or hearing changes,   Respiratory:  negative for shortness of breath, cough, or congestion  Cardiovascular:  negative for  chest pain, palpitations  Gastrointestinal:  negative for nausea, vomiting, diarrhea, constipation, abdominal pain  Genitourinary:  negative for frequency, dysuria  Integument/Breast:  negative for rash, skin lesions  Musculoskeletal:  negative for muscle aches or joint pain  Neurological:  negative for headaches, dizziness, lightheadedness, numbness, pain and tingling extremities  Behavior/Psych:  negative for depression and anxiety    Allergies: Patient has no known allergies.    Current Meds:No current outpatient medications on file.    Vital Signs:  Vitals:    06/13/25 0702   BP: (!) 102/48   Pulse: 54   Resp: 16   Temp: 96.8 °F (36 °C)   SpO2: 94%       Physical Exam:  General appearance - alert, well appearing and in no acute distress  Mental status - oriented to person, place and time with normal affect  Head - normocephalic and atraumatic  Eyes - pupils equal and reactive, extraocular eye movements intact, conjunctiva clear  Ears - hearing appears to be intact  Nose - no drainage noted  Mouth - mucous membranes

## 2025-06-13 NOTE — OP NOTE
Operative Note      Patient: Mina Goldberg  YOB: 1950  MRN: [id number]    Date of Procedure: 6/13/2025    Pre-Op Diagnosis Codes:      * PAD (peripheral artery disease) [I73.9]    Post-Op Diagnosis: Same       Procedure:  1) ultrasound-guided access left common femoral artery  2) aortogram with left lower extremity arteriogram  3) left SFA and above-knee popliteal artery atherectomy and angioplasty  4) angioplasty of left posterior tibial artery    Surgeon(s):  Delos Reyes, Arthur, MD    Assistant:   * No surgical staff found *    Anesthesia: IV Sedation    Estimated Blood Loss (mL): Minimal    Complications: None    Specimens:   * No specimens in log *    Implants:  * No implants in log *      Drains: * No LDAs found *    Findings:  Infection Present At Time Of Surgery (PATOS) (choose all levels that have infection present):  No infection present  Other Findings:   no significant aortoiliac occlusive disease  Chronic to the occlusion of the mid right SFA with reconstitution via profunda collaterals  Chronic occlusion of the right anterior and posterior tibial arteries at their origin.  Posterior tibial artery was recanalized restoring inline flow  Detailed Description of Procedure:   This is a 75-year-old male who with ischemic rest pain and dependent rubor of his right foot.  He has trouble sleeping at night due to pain in the right foot.  He was brought to the operating room and placed supine the groins were cleaned prepped and sterile drapes were applied.  Ultrasound was used to identify the left common femoral artery.  Skin was anesthetized with lidocaine and under direct ultrasound guidance access was gained and a 5 Ukrainian sheath was placed.  An Omni Flush catheter was passed to the level of the abdominal aorta and an aortogram was performed with findings as noted above.  The bifurcation was successfully crossed and a long 7 Ukrainian sheath was placed.  The patient was heparinized with 5000 units

## 2025-06-16 ENCOUNTER — TELEPHONE (OUTPATIENT)
Dept: VASCULAR SURGERY | Age: 75
End: 2025-06-16

## 2025-06-16 DIAGNOSIS — I70.221 ATHEROSCLEROSIS OF NATIVE ARTERIES OF EXTREMITIES WITH REST PAIN, RIGHT LEG (HCC): Primary | ICD-10-CM

## 2025-06-16 LAB — ECHO BSA: 2.1 M2

## 2025-06-16 NOTE — TELEPHONE ENCOUNTER
Patient's phone is not in service. Left message for emergency contact to have patient call to schedule testing then call the office to schedule a 3 week follow-up appointment with Dr. Delos Reyes.

## 2025-06-17 ENCOUNTER — CARE COORDINATION (OUTPATIENT)
Dept: CARE COORDINATION | Age: 75
End: 2025-06-17

## 2025-06-17 NOTE — CARE COORDINATION
Ambulatory Care Coordination Note     6/17/2025 10:41 AM     Patient outreach attempt by this ACM today to perform care management follow up . ACM was unable to reach the patient by telephone today;   left voice message requesting a return phone call to this ACM.     ACM: Christine Ashby RN     Care Summary Note: Follow up with CC updates, chronic conditions and wellbeing, Recent vascular outpatient procedure    PCP/Specialist follow up:   Future Appointments         Provider Specialty Dept Phone    8/22/2025 11:30 AM Derick Raygoza MD Oncology [phone]    9/15/2025 11:00 AM Fede Her MD Family Medicine [phone]            Follow Up:   Plan for next ACM outreach in approximately 1-2 days  to complete:  - disease specific assessments  - advance care planning  - goal progression  - education   Recent vascular procedure.

## 2025-06-20 ENCOUNTER — CARE COORDINATION (OUTPATIENT)
Dept: CARE COORDINATION | Age: 75
End: 2025-06-20

## 2025-06-20 NOTE — CARE COORDINATION
Ambulatory Care Coordination Note     6/20/2025 3:40 PM     Patient outreach attempt by this ACM today to perform care management follow up . ACM was unable to reach the patient by telephone today;   left voice message requesting a return phone call to this ACM.     ACM: Christine Ashby RN     Care Summary Note: Follow up with CC updates, chronic conditions and wellbeing     PCP/Specialist follow up:   Future Appointments         Provider Specialty Dept Phone    7/1/2025 9:30 AM (Arrive by 9:15 AM) [facility] [phone]    7/9/2025 10:15 AM Delos Reyes, Arthur, MD Vascular Surgery [phone]    8/22/2025 11:30 AM Derick Raygoza MD Oncology [phone]    9/15/2025 11:00 AM Fede Her MD Family Medicine [phone]            Follow Up:   Plan for next ACM outreach in approximately 1 week to complete:  - disease specific assessments  - advance care planning  - goal progression  - education .

## 2025-06-27 ENCOUNTER — CARE COORDINATION (OUTPATIENT)
Dept: CARE COORDINATION | Age: 75
End: 2025-06-27

## 2025-06-27 NOTE — CARE COORDINATION
Ambulatory Care Coordination Note     2025 4:04 PM     Patient Current Location:  Home: [facility] [address] [facility] [address]     Patient contacted the patient by telephone. Verified name and  with patient as identifiers.         ACM: Christine Ashby RN     Challenges to be reviewed by the provider   Additional needs identified to be addressed with provider No                  Method of communication with provider: none.    Utilization: Patient has not had any utilization since our last call.     Care Summary Note: Follow up with CC updates, chronic conditions and wellbeing.     Offered patient enrollment in the Remote Patient Monitoring (RPM) program for in-home monitoring: Yes, but did not enroll at this time: already monitoring with home equipment.     Assessments Completed:       2025    11:00 AM   Amb Fall Risk Assessment and TUG Test   Do you feel unsteady or are you worried about falling?  no   2 or more falls in past year? no   Fall with injury in past year? no    ,   Ambulatory Care Coordination Assessment    Care Coordination Protocol  Referral from Primary Care Provider: No  Week 1 - Initial Assessment     Do you have all of your prescriptions and are they filled?: No (Comment: no new scripts , continues same meds as he has been before)  Barriers to medication adherence: None  Are you able to afford your medications?: No  How often do you have trouble taking your medications the way you have been told to take them?: I always take them as prescribed.     Do you have Home O2 Therapy?: No      Ability to seek help/take action for Emergent Urgent situations i.e. fire, crime, inclement weather or health crisis.: Independent  Ability to ambulate to restroom: Independent  Ability handle personal hygeine needs (bathing/dressing/grooming): Independent  Ability to manage Medications: Independent  Ability to prepare Food Preparation: Independent  Ability to maintain home (clean home, laundry):  no

## 2025-07-01 ENCOUNTER — HOSPITAL ENCOUNTER (OUTPATIENT)
Dept: VASCULAR LAB | Age: 75
Discharge: HOME | End: 2025-07-03
Attending: SURGERY
Payer: MEDICARE

## 2025-07-01 DIAGNOSIS — I70.221 ATHEROSCLEROSIS OF NATIVE ARTERIES OF EXTREMITIES WITH REST PAIN, RIGHT LEG (HCC): ICD-10-CM

## 2025-07-01 PROCEDURE — 93922 UPR/L XTREMITY ART 2 LEVELS: CPT

## 2025-07-02 LAB
VAS LEFT ABI: 0.44
VAS LEFT ANKLE BP: 52 MMHG
VAS LEFT ARM BP: 117 MMHG
VAS LEFT DORSALIS PEDIS BP: 51 MMHG
VAS LEFT PTA BP: 52 MMHG
VAS LEFT TBI: 0.18
VAS LEFT TOE PRESSURE: 21 MMHG
VAS RIGHT ABI: 0.8
VAS RIGHT ANKLE BP: 91 MMHG
VAS RIGHT ARM BP: 114 MMHG
VAS RIGHT ATA MID PSV: 41.7 CM/S
VAS RIGHT CFA PROX PSV: 187 CM/S
VAS RIGHT CFA VEL RATIO: 1.7
VAS RIGHT DORSALIS PEDIS BP: 94 MMHG
VAS RIGHT EXT ILIAC DIST PSV: 108 CM/S
VAS RIGHT PERONEAL MID PSV: 79.2 CM/S
VAS RIGHT PFA PROX PSV: 202 CM/S
VAS RIGHT POP A DIST PSV: 102 CM/S
VAS RIGHT POP A PROX PSV: 88.4 CM/S
VAS RIGHT POP A PROX VEL RATIO: 0.98
VAS RIGHT PTA BP: 91 MMHG
VAS RIGHT PTA MID PSV: 13.4 CM/S
VAS RIGHT SFA DIST PSV: 90.5 CM/S
VAS RIGHT SFA DIST VEL RATIO: 1.18
VAS RIGHT SFA MID PSV: 76.7 CM/S
VAS RIGHT SFA MID VEL RATIO: 0.3
VAS RIGHT SFA PROX PSV: 258 CM/S
VAS RIGHT SFA PROX VEL RATIO: 1.4
VAS RIGHT TBI: 0.21
VAS RIGHT TOE PRESSURE: 25 MMHG

## 2025-07-02 PROCEDURE — 93926 LOWER EXTREMITY STUDY: CPT | Performed by: STUDENT IN AN ORGANIZED HEALTH CARE EDUCATION/TRAINING PROGRAM

## 2025-07-09 ENCOUNTER — TELEPHONE (OUTPATIENT)
Dept: VASCULAR SURGERY | Age: 75
End: 2025-07-09

## 2025-07-09 ENCOUNTER — OFFICE VISIT (OUTPATIENT)
Age: 75
End: 2025-07-09
Payer: MEDICARE

## 2025-07-09 VITALS
BODY MASS INDEX: 28.2 KG/M2 | DIASTOLIC BLOOD PRESSURE: 67 MMHG | HEIGHT: 70 IN | SYSTOLIC BLOOD PRESSURE: 121 MMHG | WEIGHT: 197 LBS

## 2025-07-09 DIAGNOSIS — I70.212 ATHEROSCLEROSIS OF NATIVE ARTERIES OF EXTREMITIES WITH INTERMITTENT CLAUDICATION, LEFT LEG: ICD-10-CM

## 2025-07-09 DIAGNOSIS — I70.221 ATHEROSCLEROSIS OF NATIVE ARTERIES OF EXTREMITIES WITH REST PAIN, RIGHT LEG (HCC): Primary | ICD-10-CM

## 2025-07-09 PROCEDURE — 3074F SYST BP LT 130 MM HG: CPT | Performed by: SURGERY

## 2025-07-09 PROCEDURE — G8417 CALC BMI ABV UP PARAM F/U: HCPCS | Performed by: SURGERY

## 2025-07-09 PROCEDURE — 99213 OFFICE O/P EST LOW 20 MIN: CPT | Performed by: SURGERY

## 2025-07-09 PROCEDURE — 1036F TOBACCO NON-USER: CPT | Performed by: SURGERY

## 2025-07-09 PROCEDURE — G8427 DOCREV CUR MEDS BY ELIG CLIN: HCPCS | Performed by: SURGERY

## 2025-07-09 PROCEDURE — 3017F COLORECTAL CA SCREEN DOC REV: CPT | Performed by: SURGERY

## 2025-07-09 PROCEDURE — 1123F ACP DISCUSS/DSCN MKR DOCD: CPT | Performed by: SURGERY

## 2025-07-09 PROCEDURE — 1159F MED LIST DOCD IN RCRD: CPT | Performed by: SURGERY

## 2025-07-09 PROCEDURE — 3078F DIAST BP <80 MM HG: CPT | Performed by: SURGERY

## 2025-07-09 NOTE — TELEPHONE ENCOUNTER
----- Message from EDWIN POLLACK MA sent at 7/9/2025  1:39 PM EDT -----  Regarding: FW: Schedule left leg arteriogram  Called and spoke to patient.  He is not ready to schedule the procedure at this time.  He took my phone number and said he will call back to schedule when he is ready.  ----- Message -----  From: Suzanne Becerril RN  Sent: 7/9/2025  10:03 AM EDT  To: Scarlet Ram MA  Subject: FW: Schedule left leg arteriogram                  ----- Message -----  From: Delos Reyes, Arthur, MD  Sent: 7/9/2025  10:00 AM EDT  To: [facility] Staff  Subject: Schedule left leg arteriogram                    Diagnosis: Atherosclerosis native arteries with claudication left  Procedure: Left leg arteriogram with intervention  Location: [place] or patient choice  Anesthesia: Sedation  Procedure time: 1.5 hours

## 2025-07-09 NOTE — PROGRESS NOTES
[facility]  [facility]  [address] [id number] [phone]    Mina Goldberg  1950  75 y.o.male       Chief Complaint:  Chief Complaint   Patient presents with    Post-Op Check     S/p right SFA and above-knee popliteal artery atherectomy and angioplasty with angiopla       History of present Illness:  This is a 75 y.o.male with complaints of lower extremity claudication.  He had a severe claudication of his right leg bordering on rest pain.  He underwent right leg arteriogram with intervention several weeks ago.  He tells me that his pain and claudication have essentially resolved in the right leg.  He has very robust posterior tibial triphasic signal and still a monophasic DP on the right.  His foot is warm and he no longer has the similar complaints of aching in his toes.    He has a very similar left SFA occlusion with reconstitution at Dequan's canal and we will schedule this in the upcoming weeks for revascularization as well.    Past Medical History:  has a past medical history of Arthritis, Back pain, CAD (coronary artery disease), Carotid artery stenosis, CLL (chronic lymphocytic leukemia) (HCC), Colon polyps, COPD (chronic obstructive pulmonary disease) (HCC), GERD (gastroesophageal reflux disease), History of heart artery stent, Hyperlipidemia, Hypertension, Ischemic cardiomyopathy, Left heart failure (HCC), Lymphoma (HCC), MI (myocardial infarction) (HCC), Wears dentures, and Wears eyeglasses.    Past Surgical History:   Past Surgical History:   Procedure Laterality Date    ARTHROTOMY      right foot big toe then removed    BACK SURGERY      CAROTID ENDARTERECTOMY Left 4/16/2014    CAROTID ENDARTERECTOMY Left 10/26/2020    with Lymph node biopsy fro left neck    CAROTID ENDARTERECTOMY Left 10/26/2020    REDO LEFT CAROTID ENDARTERECTOMY WITH LEFT CERVICAL LYMPH NODE BIOPSY performed by Otto Rhodes MD at [facility] OR

## 2025-07-15 ENCOUNTER — CARE COORDINATION (OUTPATIENT)
Dept: CARE COORDINATION | Age: 75
End: 2025-07-15

## 2025-07-15 NOTE — CARE COORDINATION
Ambulatory Care Coordination Note     7/15/2025 11:37 AM     Patient Current Location:  Home: [address] [place] [id number]     ACM contacted the patient by telephone. Verified name and  with patient as identifiers.     Patient graduated from the High Risk Care Management program on 7/15/2025.  Patient verbalizes confidence in the ability to self-manage at this time..  Care management goals have been completed. No further Ambulatory Care Manager follow up scheduled.      ACM: Christine Ashby RN     Challenges to be reviewed by the provider   Additional needs identified to be addressed with provider No  None               Method of communication with provider: none.    Utilization: Patient has not had any utilization since our last call.     Care Summary Note: Follow up with CC updates, chronic conditions and wellbeing.   CHF and HTN education and CC goals completed  No new concerns at this time and will graduate from CC program at this time.       Offered patient enrollment in the Remote Patient Monitoring (RPM) program for in-home monitoring: Patient is not eligible for RPM program because: patient does not have qualifying diagnosis.     Assessments Completed:       2025    11:00 AM   Amb Fall Risk Assessment and TUG Test   Do you feel unsteady or are you worried about falling?  no   2 or more falls in past year? no   Fall with injury in past year? no    ,   Ambulatory Care Coordination Assessment    Care Coordination Protocol  Referral from Primary Care Provider: No  Week 1 - Initial Assessment     Do you have all of your prescriptions and are they filled?: No (Comment: no new scripts , continues same meds as he has been before)  Barriers to medication adherence: None  Are you able to afford your medications?: No  How often do you have trouble taking your medications the way you have been told to take them?: I always take them as prescribed.     Do you have Home O2 Therapy?: No      Ability to seek

## 2025-08-18 LAB
ALBUMIN: 4.2 G/DL
ALK PHOSPHATASE: 59 U/L
ALT SERPL-CCNC: 19 U/L
AST SERPL-CCNC: 22 U/L
BASOPHILS # BLD: 0.05 X10^3UL
BASOPHILS RELATIVE PERCENT: 0.3 %
BILIRUB SERPL-MCNC: 0.8 MG/DL
BUN BLDV-MCNC: 15 MG/DL
CALCIUM SERPL-MCNC: 9.4 MG/DL
CHLORIDE BLD-SCNC: 105 MMOL/L
CO2: 30 MMOL/L
CREAT SERPL-MCNC: 0.94 MG/DL
EGFR (CKD-EPI): 85.1 ML/M1.7
EOSINOPHIL # BLD: 0.14 X10^3UL
EOSINOPHILS RELATIVE PERCENT: 0.7 %
ERYTHROCYTE [DISTWIDTH] IN BLOOD BY AUTOMATED COUNT: 45.2 FL
GLUCOSE: 130 MG/DL
HCT VFR BLD CALC: 47.5 %
HEMOGLOBIN: 14.9 G/DL
IMMATURE GRANULOCYTES %: 0.08 X10^3UL
IMMATURE GRANULOCYTES %: 0.4 %
LACTATE DEHYDROGENASE: 136 U/L
LYMPHOCYTES # BLD: 16.45 X10^3UL
LYMPHOCYTES RELATIVE PERCENT: 82.9 %
MCH RBC QN AUTO: 30.3 PG
MCHC RBC AUTO-ENTMCNC: 31.4 G/DL
MCV RBC AUTO: 96.5 FL
MONOCYTES RELATIVE PERCENT: 2.7 %
MONOCYTES: 0.53 X10^3UL
NEUTROPHILS RELATIVE PERCENT: 13 %
NEUTROPHILS: 2.6 X10^3UL
PLATELET # BLD: 68 X10^3UL
POTASSIUM SERPL-SCNC: 4.3 MMOL/L
RBC # BLD: 4.92 X10^6UL
SODIUM BLD-SCNC: 141 MMOL/L
TOTAL PROTEIN: 5.8 G/DL
WBC # BLD: 19.85 X10^3UL

## 2025-08-20 ENCOUNTER — HOSPITAL ENCOUNTER (OUTPATIENT)
Facility: MEDICAL CENTER | Age: 75
End: 2025-08-20

## 2025-08-22 ENCOUNTER — TELEPHONE (OUTPATIENT)
Age: 75
End: 2025-08-22

## 2025-08-22 ENCOUNTER — OFFICE VISIT (OUTPATIENT)
Age: 75
End: 2025-08-22
Payer: MEDICARE

## 2025-08-22 VITALS
HEART RATE: 47 BPM | DIASTOLIC BLOOD PRESSURE: 52 MMHG | BODY MASS INDEX: 28.93 KG/M2 | RESPIRATION RATE: 18 BRPM | SYSTOLIC BLOOD PRESSURE: 132 MMHG | WEIGHT: 201.6 LBS | TEMPERATURE: 97.5 F | OXYGEN SATURATION: 98 %

## 2025-08-22 DIAGNOSIS — D69.6 THROMBOCYTOPENIA, UNSPECIFIED: ICD-10-CM

## 2025-08-22 DIAGNOSIS — R59.0 CERVICAL ADENOPATHY: ICD-10-CM

## 2025-08-22 DIAGNOSIS — C91.10 CLL (CHRONIC LYMPHOCYTIC LEUKEMIA) (HCC): Primary | ICD-10-CM

## 2025-08-22 PROCEDURE — 99214 OFFICE O/P EST MOD 30 MIN: CPT | Performed by: INTERNAL MEDICINE

## 2025-08-22 PROCEDURE — 1159F MED LIST DOCD IN RCRD: CPT | Performed by: INTERNAL MEDICINE

## 2025-08-22 PROCEDURE — 1036F TOBACCO NON-USER: CPT | Performed by: INTERNAL MEDICINE

## 2025-08-22 PROCEDURE — G8427 DOCREV CUR MEDS BY ELIG CLIN: HCPCS | Performed by: INTERNAL MEDICINE

## 2025-08-22 PROCEDURE — 3078F DIAST BP <80 MM HG: CPT | Performed by: INTERNAL MEDICINE

## 2025-08-22 PROCEDURE — G8417 CALC BMI ABV UP PARAM F/U: HCPCS | Performed by: INTERNAL MEDICINE

## 2025-08-22 PROCEDURE — 3017F COLORECTAL CA SCREEN DOC REV: CPT | Performed by: INTERNAL MEDICINE

## 2025-08-22 PROCEDURE — 1123F ACP DISCUSS/DSCN MKR DOCD: CPT | Performed by: INTERNAL MEDICINE

## 2025-08-22 PROCEDURE — 1126F AMNT PAIN NOTED NONE PRSNT: CPT | Performed by: INTERNAL MEDICINE

## 2025-08-22 PROCEDURE — 3075F SYST BP GE 130 - 139MM HG: CPT | Performed by: INTERNAL MEDICINE

## 2025-09-03 RX ORDER — ZANUBRUTINIB 80 MG/1
CAPSULE, GELATIN COATED ORAL
Qty: 120 CAPSULE | Refills: 2 | Status: ACTIVE | OUTPATIENT
Start: 2025-09-03

## (undated) DEVICE — INTRODUCER SHTH 7FR CANN L11CM 0.038IN STD ORNG W/ MINI

## (undated) DEVICE — ADHESIVE SKIN CLSR 0.7ML TOP DERMBND ADV

## (undated) DEVICE — BLANKET WRM W40.2XL55.9IN IORT LO BODY + MISTRAL AIR

## (undated) DEVICE — 2F 40 CM LEMAITRE EMBOLECTOMY CATHETER, EIFU: Brand: LEMAITRE EMBOLECTOMY CATHETER

## (undated) DEVICE — NEEDLE,25GX1.5",REG,BEVEL: Brand: MEDLINE

## (undated) DEVICE — SUTURE PROL SZ 6-0 L18IN NONABSORBABLE BLU P-3 L13MM 3/8 8695G

## (undated) DEVICE — SYRINGE, LUER LOCK, 10ML: Brand: MEDLINE

## (undated) DEVICE — SUTURE MCRYL SZ 4-0 L27IN ABSRB UD L19MM PS-2 1/2 CIR PRIM Y426H

## (undated) DEVICE — GARMENT,MEDLINE,DVT,INT,CALF,MED, GEN2: Brand: MEDLINE

## (undated) DEVICE — SUTURE PROL SZ 6-0 L30IN NONABSORBABLE BLU L13MM RB-2 1/2 8711H

## (undated) DEVICE — PRESSURE MONITORING SET: Brand: TRUWAVE

## (undated) DEVICE — SUTURE NONABSORBABLE MONOFILAMENT 6-0 BV-1 1X30 IN PROLENE 8709H

## (undated) DEVICE — ELECTRODE ELECSURG NDL 2.8 INX7.2 CM COAT INSUL EDGE

## (undated) DEVICE — RADIFOCUS GLIDEWIRE ADVANTAGE GUIDEWIRE: Brand: GLIDEWIRE ADVANTAGE

## (undated) DEVICE — PREP-RESISTANT MARKER W/ RULER: Brand: MEDLINE INDUSTRIES, INC.

## (undated) DEVICE — SET CATH 20GA L1.75IN RAD ART POLYUR RADPQ W/ INTEGR

## (undated) DEVICE — TOWEL,OR,DSP,ST,BLUE,DLX,XR,4/PK,20PK/CS: Brand: MEDLINE

## (undated) DEVICE — SPONGE LAP SFT 18X18 IN X RAY DETECTABLE

## (undated) DEVICE — CORD,CAUTERY,BIPOLAR,STERILE: Brand: MEDLINE

## (undated) DEVICE — POSITIONER HD W8XH4XL8.5IN RASPBERRY FOAM SLT

## (undated) DEVICE — Device

## (undated) DEVICE — 3M(TM) MEDIPORE(TM) +PAD SOFT CLOTH ADHESIVE WOUND DRESSING 3569: Brand: 3M™ MEDIPORE™

## (undated) DEVICE — MICROPUNCTURE INTRODUCER SET SILHOUETTE TRANSITIONLESS PUSH-PLUS DESIGN - STIFFENED CANNULA WITH NITINOL WIRE GUIDE: Brand: MICROPUNCTURE

## (undated) DEVICE — CONTAINER,SPECIMEN,OR STERILE,4OZ: Brand: MEDLINE

## (undated) DEVICE — 450 ML BOTTLE OF 0.05% CHLORHEXIDINE GLUCONATE IN 99.95% STERILE WATER FOR IRRIGATION, USP AND APPLICATOR.: Brand: IRRISEPT ANTIMICROBIAL WOUND LAVAGE

## (undated) DEVICE — SOLUTION IV 500ML 0.9% SOD CHL PH 5 INJ USP VIAFLX PLAS

## (undated) DEVICE — MASTISOL ADHESIVE AMPULE

## (undated) DEVICE — CATHETER ANGIOPLSTY L130CM BAL L250MM DIA6MM GWIRE 0.035IN

## (undated) DEVICE — SUTURE PERMAHAND SZ 0 L30IN NONABSORBABLE BLK SILK BRAID A306H

## (undated) DEVICE — GAUZE, BORDER, 3"X6", 1.5"X4"PAD, STERIL: Brand: MEDLINE INDUSTRIES, INC.

## (undated) DEVICE — SUTURE VCRL SZ 3-0 L27IN ABSRB UD L26MM SH 1/2 CIR J416H

## (undated) DEVICE — STERILE COTTON BALLS LARGE 5/P: Brand: MEDLINE

## (undated) DEVICE — SOLUTION PREP 4OZ 3% H PEROX 1ST AID ANTISEP ORAL DEBRIDING

## (undated) DEVICE — PACLITAXEL-COATED PTA BALLOON CATHETER: Brand: RANGER™

## (undated) DEVICE — DRAPE THYROID

## (undated) DEVICE — INTENDED FOR TISSUE SEPARATION, AND OTHER PROCEDURES THAT REQUIRE A SHARP SURGICAL BLADE TO PUNCTURE OR CUT.: Brand: BARD-PARKER ® CARBON RIB-BACK BLADES

## (undated) DEVICE — GUIDEWIRE VASC L300CM DIA0.014IN STR SHT TAPR TIP SIL S STL

## (undated) DEVICE — GLOVE SURG SZ 8 L11.77IN FNGR THK9.8MIL STRW LTX POLYMER

## (undated) DEVICE — SUTURE VCRL SZ 3-0 L54IN ABSRB UD LIGAPAK REEL POLYGLACTIN J285G

## (undated) DEVICE — CONTAINER,SPECIMEN,4OZ,OR STRL: Brand: MEDLINE

## (undated) DEVICE — DRAPE,THYROID,SOFT,STERILE: Brand: MEDLINE

## (undated) DEVICE — CATH JETSTREAM 2.4/3.4

## (undated) DEVICE — SHUNT CV L30CM DIA3X5MM SIL EXT LOOP FULL SPR REINF SUNDT

## (undated) DEVICE — APPLICATOR MEDICATED 26 CC SOLUTION HI LT ORNG CHLORAPREP

## (undated) DEVICE — INTRODUCER SHTH 5FR CANN L11CM GWIRE 0.038IN STD KINK

## (undated) DEVICE — SUTURE VCRL + SZ 3-0 L27IN ABSRB UD L26MM SH 1/2 CIR VCP416H

## (undated) DEVICE — CATHETER GUID 5FR L70CM 0.038IN W/O HYDRPHLC COAT RADPQ

## (undated) DEVICE — INFLATION KIT CUST J REV

## (undated) DEVICE — MHPB HEAD AND NECK  PACK: Brand: MEDLINE INDUSTRIES, INC.

## (undated) DEVICE — GEL US 20GM NONIRRITATING OVERWRAPPED FILE PCH TRNSMIT

## (undated) DEVICE — CATHETER GUID OTW 0.035 IN 6 FRX135 CM 5 FR TRAILBLAZER

## (undated) DEVICE — GLOVE SURG SZ 6 L12IN THK75MIL DK GRN LTX FREE

## (undated) DEVICE — SOLUTION SURG PREP POV IOD 7.5% 4 OZ

## (undated) DEVICE — SOLUTION IV IRRIG POUR BRL 0.9% SODIUM CHL 2F7124

## (undated) DEVICE — SUTURE STRATAFIX SPRL SZ 3-0 L5IN ABSRB UD FS L26MM 3/8 CIR SXMP2B411

## (undated) DEVICE — MARKER,SKIN,WI/RULER AND LABELS: Brand: MEDLINE

## (undated) DEVICE — DRAPE,REIN 53X77,STERILE: Brand: MEDLINE

## (undated) DEVICE — GLOVE SURG SZ 75 L12IN FNGR THK79MIL GRN LTX FREE

## (undated) DEVICE — FLEXOR, CHECK-FLO, INTRODUCER ANSEL MODIFICATION: Brand: FLEXOR

## (undated) DEVICE — STRIP,CLOSURE,WOUND,MEDI-STRIP,1/2X4: Brand: MEDLINE

## (undated) DEVICE — PREMIUM DRY TRAY LF: Brand: MEDLINE INDUSTRIES, INC.

## (undated) DEVICE — PTA BALLOON DILATATION CATHETER: Brand: COYOTE™

## (undated) DEVICE — ELECTRODE ES L3IN S STL BLDE INSUL DISP VALLEYLAB EDGE

## (undated) DEVICE — STRAP,POSITIONING,KNEE/BODY,FOAM,4X60": Brand: MEDLINE

## (undated) DEVICE — SPONGE,PEANUT,XRAY,ST,SM,3/8",5/CARD: Brand: MEDLINE INDUSTRIES, INC.